# Patient Record
Sex: FEMALE | Race: WHITE | NOT HISPANIC OR LATINO | Employment: OTHER | URBAN - METROPOLITAN AREA
[De-identification: names, ages, dates, MRNs, and addresses within clinical notes are randomized per-mention and may not be internally consistent; named-entity substitution may affect disease eponyms.]

---

## 2023-08-30 ENCOUNTER — PATIENT OUTREACH (OUTPATIENT)
Dept: CARE COORDINATION | Facility: CLINIC | Age: 83
End: 2023-08-30
Payer: MEDICARE

## 2023-08-30 ENCOUNTER — DOCUMENTATION (OUTPATIENT)
Dept: CARE COORDINATION | Facility: CLINIC | Age: 83
End: 2023-08-30
Payer: MEDICARE

## 2023-09-18 ENCOUNTER — PATIENT OUTREACH (OUTPATIENT)
Dept: CARE COORDINATION | Facility: CLINIC | Age: 83
End: 2023-09-18
Payer: MEDICARE

## 2023-09-18 SDOH — ECONOMIC STABILITY: GENERAL: WOULD YOU LIKE HELP WITH ANY OF THE FOLLOWING NEEDS?: I DONT NEED HELP WITH ANY OF THESE

## 2023-09-18 SDOH — ECONOMIC STABILITY: TRANSPORTATION INSECURITY
IN THE PAST 12 MONTHS, HAS THE LACK OF TRANSPORTATION KEPT YOU FROM MEDICAL APPOINTMENTS OR FROM GETTING MEDICATIONS?: NO

## 2023-09-18 SDOH — SOCIAL STABILITY: SOCIAL NETWORK: ARE YOU MARRIED, WIDOWED, DIVORCED, SEPARATED, NEVER MARRIED, OR LIVING WITH A PARTNER?: WIDOWED

## 2023-09-18 NOTE — PROGRESS NOTES
Admitted to [facility] 9/8/23 to 9/17/23 with UTI, urinary retention.  Completed antibiotic course.  Discharged with [facility] and home care with de la garza.  PMH: HTN, HLD, TIA, urinary retention, constipation, LISA, GERD, hypothyroidism, anxiety/depression.   Has appt with psychiatry next week to evaluate discontinuation of Sertraline.   Appt with provider 9/19 and urology in  October Engagement  Call Start Time: 1027 (9/18/2023 10:27 AM)    Medications  Medications reviewed with patient/caregiver?: Yes (9/18/2023 10:27 AM)  Is the patient having any side effects they believe may be caused by any medication additions or changes?: No (9/18/2023 10:27 AM)  Does the patient have all medications ordered at discharge?: Yes (9/18/2023 10:27 AM)  Care Management Interventions: Provided patient education (Patient voices resistance to twice daily Metamucil as does not work immediately.   This writer educated patient that Metamucil is not a stimulant, but is to add fiber and bulk to stool to make it easier to pass and that Laculose is only as needed.) (9/18/2023 10:27 AM)  Is the patient taking all medications as directed (includes completed medication regime)?: Yes (9/18/2023 10:27 AM)    Appointments  Does the patient have a primary care provider?: Yes (9/18/2023 10:27 AM)    Self Management  What is the home health agency?: [facility] (9/18/2023 10:27 AM)    Patient Teaching  Does the patient have access to their discharge instructions?: Yes (9/18/2023 10:27 AM)  Care Management Interventions: Reviewed instructions with patient (9/18/2023 10:27 AM)  What is the patient's perception of their health status since discharge?: Same (9/18/2023 10:27 AM)  Is the patient/caregiver able to teach back the hierarchy of who to call/visit for symptoms/problems? PCP, Specialist, Home Health nurse, Urgent Care, ED, 911: Yes (9/18/2023 10:27 AM)    Wrap Up  Call End Time: 1111 (9/18/2023 10:27 AM)      Successful outreach to  patient for transition of care completion.   Patient states unable to get refull on Dexilant due to what sounds like a piror auth issue.  This writer suggested patient comntact pharmacy to verify and discuss at PCP appt. Will monitor for 30 day transition period and outreach if issues arise.    Dot FORD RN CCM  RN Care Coordinator  [facility]  Phone [phone]

## 2023-09-22 ENCOUNTER — PATIENT OUTREACH (OUTPATIENT)
Dept: CARE COORDINATION | Facility: CLINIC | Age: 83
End: 2023-09-22
Payer: MEDICARE

## 2023-09-22 NOTE — PROGRESS NOTES
Hospital provider appointment follow up.      Per chart review [facility] outreached patient on 9/18 with reported issues by patients able to manage de la garza catheter, taking Lactulose as needed.     Has appts with PCP 9/27 and with urology 10/19.  Patient  has had home visit by RN and PT, but needs HHA to help with shower.    Patient  does not have resources to private pay or does not qualify  for Medicaid to refer to [facility].  Patient  has contacted her insurer for assistance with such.    Patient  needs help with transportation as will require a wheelchair and has contacted insurer for such as well as [facility].  This writer provided [employer] resource for such.     Dot FORD RN CCM  RN Care Coordinator  [facility]  Phone [phone]

## 2023-10-02 ENCOUNTER — APPOINTMENT (OUTPATIENT)
Dept: RADIOLOGY | Facility: HOSPITAL | Age: 83
End: 2023-10-02
Payer: MEDICARE

## 2023-10-02 ENCOUNTER — HOSPITAL ENCOUNTER (INPATIENT)
Facility: HOSPITAL | Age: 83
LOS: 15 days | Discharge: HOME | DRG: 177 | End: 2023-10-17
Attending: GENERAL PRACTICE | Admitting: FAMILY MEDICINE
Payer: MEDICARE

## 2023-10-02 DIAGNOSIS — R09.02 HYPOXIA: ICD-10-CM

## 2023-10-02 DIAGNOSIS — F41.9 ANXIETY: ICD-10-CM

## 2023-10-02 DIAGNOSIS — R54 AGE-RELATED PHYSICAL DEBILITY: Chronic | ICD-10-CM

## 2023-10-02 DIAGNOSIS — K59.04 CHRONIC IDIOPATHIC CONSTIPATION: ICD-10-CM

## 2023-10-02 DIAGNOSIS — R33.9 URINARY RETENTION: Chronic | ICD-10-CM

## 2023-10-02 DIAGNOSIS — U07.1 COVID-19: Primary | ICD-10-CM

## 2023-10-02 DIAGNOSIS — E87.1 HYPONATREMIA: ICD-10-CM

## 2023-10-02 LAB
ALBUMIN SERPL BCP-MCNC: 3.5 G/DL (ref 3.4–5)
ALP SERPL-CCNC: 51 U/L (ref 33–136)
ALT SERPL W P-5'-P-CCNC: 17 U/L (ref 7–45)
ANION GAP SERPL CALC-SCNC: 10 MMOL/L (ref 10–20)
APAP SERPL-MCNC: <10 UG/ML
AST SERPL W P-5'-P-CCNC: 20 U/L (ref 9–39)
BASOPHILS # BLD AUTO: 0.05 X10*3/UL (ref 0–0.1)
BASOPHILS NFR BLD AUTO: 0.8 %
BILIRUB SERPL-MCNC: 0.4 MG/DL (ref 0–1.2)
BUN SERPL-MCNC: 5 MG/DL (ref 6–23)
CALCIUM SERPL-MCNC: 8.4 MG/DL (ref 8.6–10.3)
CHLORIDE SERPL-SCNC: 94 MMOL/L (ref 98–107)
CO2 SERPL-SCNC: 24 MMOL/L (ref 21–32)
CREAT SERPL-MCNC: 0.48 MG/DL (ref 0.5–1.05)
EOSINOPHIL # BLD AUTO: 0.07 X10*3/UL (ref 0–0.4)
EOSINOPHIL NFR BLD AUTO: 1.1 %
ERYTHROCYTE [DISTWIDTH] IN BLOOD BY AUTOMATED COUNT: 15.2 % (ref 11.5–14.5)
ETHANOL SERPL-MCNC: <10 MG/DL
GFR SERPL CREATININE-BSD FRML MDRD: >90 ML/MIN/1.73M*2
GLUCOSE SERPL-MCNC: 98 MG/DL (ref 74–99)
HCT VFR BLD AUTO: 31.9 % (ref 36–46)
HGB BLD-MCNC: 10.8 G/DL (ref 12–16)
IMM GRANULOCYTES # BLD AUTO: 0.03 X10*3/UL (ref 0–0.5)
IMM GRANULOCYTES NFR BLD AUTO: 0.5 % (ref 0–0.9)
LYMPHOCYTES # BLD AUTO: 1.25 X10*3/UL (ref 0.8–3)
LYMPHOCYTES NFR BLD AUTO: 19.6 %
MCH RBC QN AUTO: 32.1 PG (ref 26–34)
MCHC RBC AUTO-ENTMCNC: 33.9 G/DL (ref 32–36)
MCV RBC AUTO: 95 FL (ref 80–100)
MONOCYTES # BLD AUTO: 1.27 X10*3/UL (ref 0.05–0.8)
MONOCYTES NFR BLD AUTO: 19.9 %
NEUTROPHILS # BLD AUTO: 3.7 X10*3/UL (ref 1.6–5.5)
NEUTROPHILS NFR BLD AUTO: 58.1 %
NRBC BLD-RTO: 0 /100 WBCS (ref 0–0)
PLATELET # BLD AUTO: 221 X10*3/UL (ref 150–450)
PMV BLD AUTO: 11.8 FL (ref 7.5–11.5)
POTASSIUM SERPL-SCNC: 3.7 MMOL/L (ref 3.5–5.3)
PROT SERPL-MCNC: 8.5 G/DL (ref 6.4–8.2)
RBC # BLD AUTO: 3.36 X10*6/UL (ref 4–5.2)
SALICYLATES SERPL-MCNC: <3 MG/DL
SARS-COV-2 RNA RESP QL NAA+PROBE: DETECTED
SODIUM SERPL-SCNC: 124 MMOL/L (ref 136–145)
WBC # BLD AUTO: 6.4 X10*3/UL (ref 4.4–11.3)

## 2023-10-02 PROCEDURE — 71046 X-RAY EXAM CHEST 2 VIEWS: CPT | Mod: FY

## 2023-10-02 PROCEDURE — 1210000001 HC SEMI-PRIVATE ROOM DAILY

## 2023-10-02 PROCEDURE — 3E0D73Z INTRODUCTION OF ANTI-INFLAMMATORY INTO MOUTH AND PHARYNX, VIA NATURAL OR ARTIFICIAL OPENING: ICD-10-PCS | Performed by: GENERAL PRACTICE

## 2023-10-02 PROCEDURE — 99285 EMERGENCY DEPT VISIT HI MDM: CPT | Performed by: GENERAL PRACTICE

## 2023-10-02 PROCEDURE — 71046 X-RAY EXAM CHEST 2 VIEWS: CPT | Performed by: RADIOLOGY

## 2023-10-02 PROCEDURE — 2500000001 HC RX 250 WO HCPCS SELF ADMINISTERED DRUGS (ALT 637 FOR MEDICARE OP): Performed by: GENERAL PRACTICE

## 2023-10-02 PROCEDURE — 96376 TX/PRO/DX INJ SAME DRUG ADON: CPT

## 2023-10-02 PROCEDURE — 96366 THER/PROPH/DIAG IV INF ADDON: CPT

## 2023-10-02 PROCEDURE — 96361 HYDRATE IV INFUSION ADD-ON: CPT

## 2023-10-02 PROCEDURE — 80329 ANALGESICS NON-OPIOID 1 OR 2: CPT | Performed by: GENERAL PRACTICE

## 2023-10-02 PROCEDURE — 84075 ASSAY ALKALINE PHOSPHATASE: CPT | Performed by: GENERAL PRACTICE

## 2023-10-02 PROCEDURE — 36415 COLL VENOUS BLD VENIPUNCTURE: CPT | Performed by: GENERAL PRACTICE

## 2023-10-02 PROCEDURE — 5A09357 ASSISTANCE WITH RESPIRATORY VENTILATION, LESS THAN 24 CONSECUTIVE HOURS, CONTINUOUS POSITIVE AIRWAY PRESSURE: ICD-10-PCS | Performed by: FAMILY MEDICINE

## 2023-10-02 PROCEDURE — 96365 THER/PROPH/DIAG IV INF INIT: CPT

## 2023-10-02 PROCEDURE — 96375 TX/PRO/DX INJ NEW DRUG ADDON: CPT

## 2023-10-02 PROCEDURE — 2500000005 HC RX 250 GENERAL PHARMACY W/O HCPCS: Performed by: GENERAL PRACTICE

## 2023-10-02 PROCEDURE — XW043E5 INTRODUCTION OF REMDESIVIR ANTI-INFECTIVE INTO CENTRAL VEIN, PERCUTANEOUS APPROACH, NEW TECHNOLOGY GROUP 5: ICD-10-PCS | Performed by: GENERAL PRACTICE

## 2023-10-02 PROCEDURE — 2500000004 HC RX 250 GENERAL PHARMACY W/ HCPCS (ALT 636 FOR OP/ED): Performed by: GENERAL PRACTICE

## 2023-10-02 PROCEDURE — 85025 COMPLETE CBC W/AUTO DIFF WBC: CPT | Performed by: GENERAL PRACTICE

## 2023-10-02 PROCEDURE — 87635 SARS-COV-2 COVID-19 AMP PRB: CPT | Performed by: GENERAL PRACTICE

## 2023-10-02 RX ORDER — DEXAMETHASONE 6 MG/1
6 TABLET ORAL DAILY
Status: DISCONTINUED | OUTPATIENT
Start: 2023-10-03 | End: 2023-10-03

## 2023-10-02 RX ORDER — DEXAMETHASONE SODIUM PHOSPHATE 4 MG/ML
6 INJECTION, SOLUTION INTRA-ARTICULAR; INTRALESIONAL; INTRAMUSCULAR; INTRAVENOUS; SOFT TISSUE ONCE
Status: COMPLETED | OUTPATIENT
Start: 2023-10-02 | End: 2023-10-02

## 2023-10-02 RX ORDER — ACETAMINOPHEN 325 MG/1
975 TABLET ORAL ONCE
Status: COMPLETED | OUTPATIENT
Start: 2023-10-02 | End: 2023-10-02

## 2023-10-02 RX ORDER — DEXAMETHASONE SODIUM PHOSPHATE 100 MG/10ML
6 INJECTION INTRAMUSCULAR; INTRAVENOUS ONCE
Status: DISCONTINUED | OUTPATIENT
Start: 2023-10-02 | End: 2023-10-02

## 2023-10-02 RX ADMIN — ACETAMINOPHEN 975 MG: 325 TABLET ORAL at 14:58

## 2023-10-02 RX ADMIN — DEXAMETHASONE SODIUM PHOSPHATE 6 MG: 10 INJECTION INTRAMUSCULAR; INTRAVENOUS at 16:40

## 2023-10-02 RX ADMIN — SODIUM CHLORIDE 1000 ML: 9 INJECTION, SOLUTION INTRAVENOUS at 17:55

## 2023-10-02 RX ADMIN — DEXAMETHASONE SODIUM PHOSPHATE 6 MG: 4 INJECTION, SOLUTION INTRAMUSCULAR; INTRAVENOUS at 18:41

## 2023-10-02 RX ADMIN — REMDESIVIR 200 MG: 100 INJECTION, POWDER, LYOPHILIZED, FOR SOLUTION INTRAVENOUS at 18:44

## 2023-10-02 SDOH — HEALTH STABILITY: MENTAL HEALTH: ARE YOU HAVING THOUGHTS OF KILLING YOURSELF RIGHT NOW?: YES

## 2023-10-02 SDOH — HEALTH STABILITY: MENTAL HEALTH: SUICIDAL BEHAVIOR (3 MONTHS): YES

## 2023-10-02 SDOH — HEALTH STABILITY: MENTAL HEALTH: HOW DID YOU TRY TO KILL YOURSELF?: PATIENT DID NOT DESCRIBE

## 2023-10-02 SDOH — HEALTH STABILITY: MENTAL HEALTH: IN THE PAST WEEK, HAVE YOU BEEN HAVING THOUGHTS ABOUT KILLING YOURSELF?: YES

## 2023-10-02 SDOH — HEALTH STABILITY: MENTAL HEALTH: IN THE PAST FEW WEEKS, HAVE YOU FELT THAT YOU OR YOUR FAMILY WOULD BE BETTER OFF IF YOU WERE DEAD?: YES

## 2023-10-02 SDOH — HEALTH STABILITY: MENTAL HEALTH
DEPRESSION SYMPTOMS: APPETITE CHANGE;CRYING;FEELINGS OF HELPLESSNESS;FEELINGS OF HOPELESSESS;FEELINGS OF WORTHLESSNESS;ISOLATIVE;LOSS OF INTEREST;SLEEP DISTURBANCE

## 2023-10-02 SDOH — HEALTH STABILITY: MENTAL HEALTH: NON-SPECIFIC ACTIVE SUICIDAL THOUGHTS (PAST 1 MONTH): YES

## 2023-10-02 SDOH — HEALTH STABILITY: MENTAL HEALTH

## 2023-10-02 SDOH — HEALTH STABILITY: MENTAL HEALTH: IN THE PAST FEW WEEKS, HAVE YOU WISHED YOU WERE DEAD?: YES

## 2023-10-02 SDOH — HEALTH STABILITY: MENTAL HEALTH: DESCRIBE YOUR THOUGHTS OF KILLING YOURSELF RIGHT NOW:: PATIENT DID NOT DESCRIBE

## 2023-10-02 SDOH — HEALTH STABILITY: MENTAL HEALTH: SUICIDAL BEHAVIOR (LIFETIME): YES

## 2023-10-02 SDOH — HEALTH STABILITY: MENTAL HEALTH: HAVE YOU EVER TRIED TO KILL YOURSELF?: YES

## 2023-10-02 SDOH — HEALTH STABILITY: MENTAL HEALTH: ACTIVE SUICIDAL IDEATION WITH SOME INTENT TO ACT, WITHOUT SPECIFIC PLAN (PAST 1 MONTH): NO

## 2023-10-02 SDOH — HEALTH STABILITY: MENTAL HEALTH: WISH TO BE DEAD (PAST 1 MONTH): YES

## 2023-10-02 SDOH — HEALTH STABILITY: MENTAL HEALTH: ANXIETY SYMPTOMS: GENERALIZED;FEELINGS OF DOOM

## 2023-10-02 SDOH — ECONOMIC STABILITY: HOUSING INSECURITY: FEELS SAFE LIVING IN HOME: YES

## 2023-10-02 SDOH — ECONOMIC STABILITY: GENERAL: FINANCIAL CONCERNS: INSURANCE COVERAGE IS INADEQUATE

## 2023-10-02 SDOH — HEALTH STABILITY: MENTAL HEALTH: ACTIVE SUICIDAL IDEATION WITH SPECIFIC PLAN AND INTENT (PAST 1 MONTH): NO

## 2023-10-02 SDOH — HEALTH STABILITY: MENTAL HEALTH: SUICIDAL BEHAVIOR (DESCRIPTION): SHE DID NOT SHARE WITH EPAT

## 2023-10-02 ASSESSMENT — LIFESTYLE VARIABLES
PRESCIPTION_ABUSE_PAST_12_MONTHS: NO
SUBSTANCE_ABUSE_PAST_12_MONTHS: NO

## 2023-10-02 ASSESSMENT — COLUMBIA-SUICIDE SEVERITY RATING SCALE - C-SSRS
5. HAVE YOU STARTED TO WORK OUT OR WORKED OUT THE DETAILS OF HOW TO KILL YOURSELF? DO YOU INTEND TO CARRY OUT THIS PLAN?: NO
6. HAVE YOU EVER DONE ANYTHING, STARTED TO DO ANYTHING, OR PREPARED TO DO ANYTHING TO END YOUR LIFE?: YES
4. HAVE YOU HAD THESE THOUGHTS AND HAD SOME INTENTION OF ACTING ON THEM?: NO
6. HAVE YOU EVER DONE ANYTHING, STARTED TO DO ANYTHING, OR PREPARED TO DO ANYTHING TO END YOUR LIFE?: NO
1. IN THE PAST MONTH, HAVE YOU WISHED YOU WERE DEAD OR WISHED YOU COULD GO TO SLEEP AND NOT WAKE UP?: YES
6. HAVE YOU EVER DONE ANYTHING, STARTED TO DO ANYTHING, OR PREPARED TO DO ANYTHING TO END YOUR LIFE?: NO
5. HAVE YOU STARTED TO WORK OUT OR WORKED OUT THE DETAILS OF HOW TO KILL YOURSELF? DO YOU INTEND TO CARRY OUT THIS PLAN?: NO
2. HAVE YOU ACTUALLY HAD ANY THOUGHTS OF KILLING YOURSELF?: YES
6. HAVE YOU EVER DONE ANYTHING, STARTED TO DO ANYTHING, OR PREPARED TO DO ANYTHING TO END YOUR LIFE?: YES
2. HAVE YOU ACTUALLY HAD ANY THOUGHTS OF KILLING YOURSELF?: YES
1. IN THE PAST MONTH, HAVE YOU WISHED YOU WERE DEAD OR WISHED YOU COULD GO TO SLEEP AND NOT WAKE UP?: YES
4. HAVE YOU HAD THESE THOUGHTS AND HAD SOME INTENTION OF ACTING ON THEM?: NO

## 2023-10-02 ASSESSMENT — PAIN - FUNCTIONAL ASSESSMENT: PAIN_FUNCTIONAL_ASSESSMENT: 0-10

## 2023-10-02 ASSESSMENT — PAIN SCALES - GENERAL
PAINLEVEL_OUTOF10: 5 - MODERATE PAIN
PAINLEVEL_OUTOF10: 0 - NO PAIN
PAINLEVEL_OUTOF10: 8

## 2023-10-02 ASSESSMENT — PAIN DESCRIPTION - LOCATION: LOCATION: ABDOMEN

## 2023-10-02 NOTE — ED PROVIDER NOTES
HPI   Chief Complaint   Patient presents with    Abdominal Pain    Headache     Pt sts that she has been experiencing a HA x all day       HPIHPI: This is an 82-year-old female with a history of HTN, HLD, urinary retention, anxiety/depression presenting for suicidal ideation.  She reports that she has been having thoughts of self-harm without a definitive plan recently.  She denies any attempt to harm herself recently.  She is also endorsing a productive cough and headache over the past several days.      Limitations to history: None  Independent Historians: Patient  External Records Reviewed: HIE, outpatient notes, inpatient notes  ------------------------------------------------------------------------------------------------------------------------------------------  ROS: a ten point review of systems was performed and was negative except as per HPI.  ------------------------------------------------------------------------------------------------------------------------------------------  PMH / PSH: as per HPI, otherwise reviewed in EMR  MEDS: as per HPI, otherwise reviewed in EMR  ALLERGIES: as per HPI, otherwise reviewed in EMR  SocH:  as per HPI, otherwise reviewed in EMR  FH:  as per HPI, otherwise reviewed in EMR  ------------------------------------------------------------------------------------------------------------------------------------------  Physical Exam:  VS: As documented in the triage note and EMR flowsheet from this visit was reviewed  General: Well appearing. No acute distress.   Eyes:  Extraocular movements grossly intact. No scleral icterus. No discharge  HEENT:  Normocephalic.  Atraumatic  Neck: Moves neck freely. No gross masses  CV: Regular rhythm. No murmurs, rubs or gallops   Resp: Clear to auscultation bilaterally. No respiratory distress.    GI: Soft, no masses, nontender. No rebound tenderness or guarding. Samson catheter in place  MSK: Symmetric muscle bulk. No deformities. No lower  extremity edema.  Diffuse tenderness of the thoracolumbar region bilaterally  Skin: Warm, dry, intact.   Neuro: No focal deficits.  A&O x3.   Psych: Appropriate for situation  ------------------------------------------------------------------------------------------------------------------------------------------  Hospital Course / Medical Decision Making:  Independent Interpretations: Chest xray  EKG as interpreted by me: Normal sinus rhythm 81 bpm with normal axis, no bundle branch block and no signs of acute ischemia    MDM: This is an 82-year-old female with a history of HTN, HLD, urinary retention presenting for suicidal ideation.  She denies having definitive plan.  She is noted to be mildly hypoxic in the ED and was placed on nasal cannula.  She is positive for COVID-19.  X-ray shows cardiomegaly with pulmonary opacities suggestive of pulmonary edema.  She was given Decadron and remdesivir in the ED.  The patient was evaluated by EPAT and determined to require hospitalization, however, the fact that she has COVID-19 needs that she does need to be medically admitted.  The patient was admitted to the medicine service and during her admission she will be further evaluated by psychiatry.    Discussion of Management with Other Providers:   I discussed the patient/results with: Emergency medicine team    Final diagnosis and disposition as below.    Labs Reviewed   CBC WITH AUTO DIFFERENTIAL - Abnormal       Result Value    WBC 6.4      nRBC 0.0      RBC 3.36 (*)     Hemoglobin 10.8 (*)     Hematocrit 31.9 (*)     MCV 95      MCH 32.1      MCHC 33.9      RDW 15.2 (*)     Platelets 221      MPV 11.8 (*)     Neutrophils % 58.1      Immature Granulocytes %, Automated 0.5      Lymphocytes % 19.6      Monocytes % 19.9      Eosinophils % 1.1      Basophils % 0.8      Neutrophils Absolute 3.70      Immature Granulocytes Absolute, Automated 0.03      Lymphocytes Absolute 1.25      Monocytes Absolute 1.27 (*)     Eosinophils  Absolute 0.07      Basophils Absolute 0.05     COMPREHENSIVE METABOLIC PANEL - Abnormal    Glucose 98      Sodium 124 (*)     Potassium 3.7      Chloride 94 (*)     Bicarbonate 24      Anion Gap 10      Urea Nitrogen 5 (*)     Creatinine 0.48 (*)     eGFR >90      Calcium 8.4 (*)     Albumin 3.5      Alkaline Phosphatase 51      Total Protein 8.5 (*)     AST 20      Bilirubin, Total 0.4      ALT 17     SARS-COV-2 PCR, SYMPTOMATIC - Abnormal    Coronavirus 2019, PCR Detected (*)     Narrative:     This assay has received FDA Emergency Use Authorization (EUA) and is only authorized for the duration of time that circumstances exist to justify the authorization of the emergency use of in vitro diagnostic tests for the detection of SARS-CoV-2 virus and/or diagnosis of COVID-19 infection under section 564(b)(1) of the Act, 21 U.S.C. 360bbb-3(b)(1). This assay is an in vitro diagnostic nucleic acid amplification test for the qualitative detection of SARS-CoV-2 from nasopharyngeal specimens and has been validated for use at [facility]. Negative results do not preclude COVID-19 infections and should not be used as the sole basis for diagnosis, treatment, or other management decisions.     CBC - Abnormal    WBC 8.1      nRBC 0.0      RBC 4.03      Hemoglobin 12.9      Hematocrit 39.0      MCV 97      MCH 32.0      MCHC 33.1      RDW 14.8 (*)     Platelets 212      MPV 10.7     BASIC METABOLIC PANEL - Abnormal    Glucose 103 (*)     Sodium 123 (*)     Potassium 3.6      Chloride 89 (*)     Bicarbonate 26      Anion Gap 12      Urea Nitrogen 13      Creatinine 0.48 (*)     eGFR >90      Calcium 7.9 (*)    CBC - Abnormal    WBC 7.1      nRBC 0.0      RBC 3.77 (*)     Hemoglobin 12.1      Hematocrit 34.2 (*)     MCV 91      MCH 32.1      MCHC 35.4      RDW 14.1      Platelets 229      MPV 10.6     BASIC METABOLIC PANEL - Abnormal    Glucose 97      Sodium 122 (*)     Potassium 3.1 (*)     Chloride 91 (*)      Bicarbonate 25      Anion Gap 9 (*)     Urea Nitrogen 13      Creatinine 0.41 (*)     eGFR >90      Calcium 7.5 (*)    URIC ACID - Abnormal    Uric Acid 2.2 (*)    OSMOLALITY - Abnormal    Osmolality, Serum 259 (*)    URINALYSIS WITH REFLEX MICROSCOPIC - Abnormal    Color, Urine Yellow      Appearance, Urine Cloudy (*)     Specific Gravity, Urine 1.015      pH, Urine 7.0      Protein, Urine 100 (2+) (*)     Glucose, Urine NEGATIVE      Blood, Urine MODERATE (2+) (*)     Ketones, Urine NEGATIVE      Bilirubin, Urine NEGATIVE      Urobilinogen, Urine 4.0 (*)     Nitrite, Urine NEGATIVE      Leukocyte Esterase, Urine LARGE (3+) (*)    BASIC METABOLIC PANEL - Abnormal    Glucose 117 (*)     Sodium 121 (*)     Potassium 3.3 (*)     Chloride 90 (*)     Bicarbonate 26      Anion Gap 8 (*)     Urea Nitrogen 13      Creatinine 0.41 (*)     eGFR >90      Calcium 7.3 (*)    URINALYSIS MICROSCOPIC ONLY - Abnormal    WBC, Urine >50 (*)     RBC, Urine 11-20 (*)     Bacteria, Urine 3+ (*)     Mucus, Urine 1+      Triple Phosphate Crystals, Urine 1+      Amorphous Crystals, Urine 1+     CBC - Abnormal    WBC 7.0      nRBC 0.0      RBC 3.87 (*)     Hemoglobin 12.5      Hematocrit 34.9 (*)     MCV 90      MCH 32.3      MCHC 35.8      RDW 13.9      Platelets 238      MPV 10.8     POCT GLUCOSE - Abnormal    POCT Glucose 102 (*)    DRUG SCREEN, URINE WITH REFLEX TO CONFIRMATION - Normal    Amphetamine Screen, Urine Presumptive Negative      Barbiturate Screen, Urine Presumptive Negative      Benzodiazepines Screen, Urine Presumptive Negative      Cannabinoid Screen, Urine Presumptive Negative      Cocaine Metabolite Screen, Urine Presumptive Negative      Fentanyl Screen, Urine Presumptive Negative      Opiate Screen, Urine Presumptive Negative      Oxycodone Screen, Urine Presumptive Negative      PCP Screen, Urine Presumptive Negative      Narrative:     Drug screen results are presumptive and should not be used to assess    compliance with prescribed medication. Definitive confirmatory drug testing   has been added to this sample for any positive screen result and will be  reported separately.     Toxicology screening results are reported qualitatively. The concentration must  be greater than or equal to the cutoff to be reported as positive. The concentration   at which the screening test can detect an individual drug or metabolite varies.   The absence of expected drug(s) and/or drug metabolite(s) may indicate non-compliance,   inappropriate timing of specimen collection relative to drug administration, poor drug   absorption, diluted/adulterated urine, or limitations of testing. For medical purposes   only; not valid for forensic use.    Interpretive questions should be directed to the laboratory medical directors.   ACUTE TOXICOLOGY PANEL, BLOOD - Normal    Acetaminophen <10.0      Salicylate  <3      Alcohol <10     OSMOLALITY, URINE - Normal    Osmolality, Urine Random 450     TSH WITH REFLEX TO FREE T4 IF ABNORMAL - Normal    Thyroid Stimulating Hormone 1.74      Narrative:     TSH testing is performed using different testing methodology at [facility] than at other [facility]. Direct result comparisons should only be made within the same method.     POCT GLUCOSE - Normal    POCT Glucose 98     SODIUM, URINE RANDOM    Sodium, Urine Random 18      Creatinine, Urine Random 67.9      Sodium/Creatinine Ratio 27     GRAY TOP   GREEN TOP                         Leticia Coma Scale Score: 15                  Patient History   Past Medical History:   Diagnosis Date    Personal history of other mental and behavioral disorders     History of psychiatric hospitalization    Personal history of other specified conditions 01/10/2022    History of insomnia     Past Surgical History:   Procedure Laterality Date    MR HEAD ANGIO WO IV CONTRAST  1/5/2012    MR HEAD ANGIO WO IV CONTRAST 1/5/2012 [facility]ICAL [facility]GAC[facility]     No  family history on file.  Social History     Tobacco Use    Smoking status: Not on file    Smokeless tobacco: Not on file   Substance Use Topics    Alcohol use: Not on file    Drug use: Not on file       Physical Exam   ED Triage Vitals [10/02/23 1102]   Temp Heart Rate Resp BP   37.9 °C (100.3 °F) 75 20 (!) 169/122      SpO2 Temp src Heart Rate Source Patient Position   100 % -- Other (Comment) Sitting      BP Location FiO2 (%)     Left arm --       Physical Exam    ED Course & MDM   Diagnoses as of 10/08/23 1119   COVID-19       Medical Decision Making      Procedure  Procedures     Dago Liu,   10/08/23 1123

## 2023-10-02 NOTE — PROGRESS NOTES
EPAT - Social Work Psychiatric Assessment    Arrival Details  Mode of Arrival: Ambulance  Admission Source: Home  Admission Type: Voluntary  EPAT Assessment Start Date: 10/02/23  EPAT Assessment Start Time: 1424  Name of : Ange Hayward M.Ed., DAT    History of Present Illness  Admission Reason: Headache, depression and suicidal ideation  HPI: -         Additional Symptoms - Adult  Generalized Anxiety Disorder: Difficult to control worry, Difficulity concentrating, Excessive anxiety/worry, Sleep disturbance  Obsessive Compulsive Disorder: No problems reported or observed.  Panic Attack: No problems reported or observed.  Post Traumatic Stress Disorder: No problems reported or observed.  Delirium: No problems reported or observed.  Review of Symptoms Comments: Patient has a history of severe major depressive disorder    Past Psychiatric History/Meds/Treatments  Past Psychiatric History: Anxiety and major depressive disorder  Past Psychiatric Meds/Treatments: Received ECT about 12 years ago for the depression. Is currently is prescribed psychiatric medications for depression/anxiety (certamine and quetipine)  Past Violence/Victimization History: None reported    Current Mental Health Contacts   Name/Phone Number: None   Last Appointment Date: -  Provider Name/Phone Number: -  Provider Last Appointment Date: -    Support System: Friends    Living Arrangement: House    Home Safety  Feels Safe Living in Home: Yes  Home Safety : Feels safe at home.    Income Information  Employment Status for: Patient (Retired)  Employment Status: Retired  Income Source: Social Security  Current/Previous Occupation: Unable to Assess  Income/Expense Information: Income meets expenses  Financial Concerns: Insurance Coverage is Inadequate  Who Manages Finances if Patient Unable: No one    [place] Service/Education History  Current or Previous  Service: None  Education Level:  (Not assessed)  History  of Learning Problems:  (Not assessed)  History of School Behavior Problems:  (Not assessed)  School History: Not assessed    Social/Cultural History  Social History: [place] citizen  Cultural Requests During Hospitalization: None  Spiritual Requests During Hospitalization: None  Important Activities: Social (Patient likes to be around friends)    Legal  Legal Considerations: Patient/ Family Ability to Make Healthcare Decisions  Assistance with Managing/Advocating Healthcare Needs: Other (Comment)  Criminal Activity/ Legal Involvement Pertinent to Current Situation/ Hospitalization: No legal activity  Legal Concerns: No legal concerns  Legal Comments: None    Drug Screening  Have you used any substances (canabis, cocaine, heroin, hallucinogens, inhalants, etc.) in the past 12 months?: No  Have you used any prescription drugs other than prescribed in the past 12 months?: No  Is a toxicology screen needed?: Yes (Only for admission purposes)    Stage of Change  Stage of Change: Other (Comment) (n/a)  History of Treatment: Other (Comment) (None)  Type of Treatment Offered:  (None)  Treatment Offered:  (None)  Duration of Substance Use: n/a  Frequency of Substance Use: n/a  Age of First Substance Use: n/a    Psychosocial  Psychosocial (WDL): Exceptions to WDL  Behaviors/Mood: Anxious, Appropriate for situation, Calm, Congruent, Cooperative, Sad, Tearful  Affect: Appropriate to circumstances  Parent/Guardian/Significant Other Involvement: No involvement  Family Behaviors: Other (Comment) (Patient is 82 yrs old and lives alone with  no one to help her)  Visitor Behaviors: Other (Comment) (None)  Needs Expressed: Other (Comment) (Patient was able to explain how she was feeling and that she needs/wants help)  Emotional Support Given: Patient counseling    Orientation  Orientation Level: Oriented X4    General Appearance  Motor Activity: Other (Comment) (Patient has health issues and is 82. She has back pain and may need help with  "daily chores and getting dressed)  Speech Pattern:  (Patient communicates well)  General Attitude: Cooperative, Other (Comment) (Patient was able to communicate frustrations and symptoms)  Appearance/Hygiene: Disheveled, Soiled clothes, Other (Comment) (Patient has been having a hard time with her health and wants to try and \"get dressed and fix herself up\". She said she hasn't had the will to do it lately.)    Thought Process  Coherency: Other (Comment) (Coherent and logical thoughts and communication)  Content: Unremarkable  Delusions:  (None)  Perception: Not altered  Hallucination: None  Judgment/Insight: Other (Comment) (Good judgement and insight into knowing that her mental health has not been good.)  Confusion: None  Cognition: Appropriate judgement, Appropriate attention/concentration, Follows commands, No long term memory loss, No short term memory loss    Sleep Pattern  Sleep Pattern: Difficulty falling asleep, Insomnia, Disturbed/interrupted sleep    Risk Factors  Self Harm/Suicidal Ideation Plan: Thinks about suicide but no plan  Previous Self Harm/Suicidal Plans: None  Risk Factors:  (Patient is 82, all along, and experiencing severe depressive symptoms)  Description of Thoughts/Ideas Leaving Unit Now: unkown    Violence Risk Assessment  Assessment of Violence: None noted  Thoughts of Harm to Others: No    Ability to Assess Risk Screen  Risk Screen - Ability to Assess: Able to be screened  Ask Suicide-Screening Questions  1. In the past few weeks, have you wished you were dead?: Yes  2. In the past few weeks, have you felt that you or your family would be better off if you were dead?: Yes  3. In the past week, have you been having thoughts about killing yourself?: Yes  4. Have you ever tried to kill yourself?: Yes  How did you try to kill yourself?: patient did not describe  When did you try to kill yourself?: About 12 years ago she moved from [place] to [place]. and \"had to leave a dear friend\" and " became extremely depressed living all alone.  5. Are you having thoughts of killing yourself right now?: Yes  Describe your thoughts of killing yourself right now:: Patient did not describe  Calculated Risk Score: Imminent Risk  [place] Suicide Severity Rating Scale (Screener/Recent Self-Report)  1. Wish to be Dead (Past 1 Month): Yes  2. Non-Specific Active Suicidal Thoughts (Past 1 Month): Yes  3. Active Suicidal Ideation with any Methods (Not Plan) Without Intent to Act (Past 1 Month): No  4. Active Suicidal Ideation with Some Intent to Act, Without Specific Plan (Past 1 Month): No  5. Active Suicidal Ideation with Specific Plan and Intent (Past 1 Month): No  6. Suicidal Behavior (Lifetime): Yes  6. Suicidal Behavior (3 Months): Yes  6. Suicidal Behavior (Description): She did not share with epat  Calculated C-SSRS Risk Score (Lifetime/Recent): High Risk  Step 1: Risk Factors  Presenting Symptoms: Anhedonia, Hopelessness or despair, Anxiety and/or panic, Insomia (Depression)  Precipitants/Stressors: Triggering events leading to humiliation, shame, and/or despair (e.g. loss of relationship, financial or health status) (real or anticipated), Chronic physical pain or other acute medical problem (e.g. CNS disorders), Inadequate social supports, Social isolation  Change in Treatment: Hopeless or dissatisfied with provider or treatment  Access to Lethal Methods : No  Step 2: Protective Factors   Protective Factors Internal: Other (Comment) (Patient said she's losing interest to even get out of bed anymore)  Protective Factors External: Other (Comment) (friends)  Step 3: Suicidal Ideation Intensity  Most Severe Suicidal Ideation Identified: Has constant suicidal thoughts  How Many Times Have You Had These Thoughts: Daily or almost daily  When You Have the Thoughts How Long do They Last : More than 8 hours/persistent or continuous  Could/Can You Stop Thinking About Killing Yourself or Wanting to Die if You Want to:  Unable to control thoughts  Are There Things - Anyone or Anything - That Stopped You From Wanting to Die or Acting on: Deterrents most definitely did not stop you  What Sort of Reasons Did You Have For Thinking About Wanting to Die or Killing Yourself: Mostly to end or stop the pain (you couldn't go on living with the pain or how you were feeling)  Total Score: 23  Step 5: Documentation  Risk Level: High suicide risk    Psychiatric Impression and Plan of Care  Specific Resources Provided to Patient: Inpatient psychiatric admission  CM Notified: -  PHP/IOP Recommended: -  Specific Information Provided for PHP/IOP: -  Plan Comments: -    Outcome/Disposition  Patient's Perception of Outcome Achieved: Satisfied  Assessment, Recommendations and Risk Level Reviewed with: The patient is a 82 year old female presenting in the ED with a headache, depressive symptoms, and suicidal ideation. The patient has a history of inpatient hospitalization for depression 12 years ago and has attempted suicide then. The patient said at that time, 12 years ago she got ECT treatment and that helped her depression. She is currently on medication for anxiety and depression, but feels like it is not effective. The patient has had some health issues and has been confined to her house which is making her depression worsen. She lives by herself and said that she misses being able to be out with her friends. She has SI but does not have a plan but has been thinking a lot about ending her life. She scores high risk for suicide according to this writers opinion and according to the [place] suicide assessment. The patient would benefit and meets criteria for inpatient psychiatric hospitalization.

## 2023-10-02 NOTE — ED TRIAGE NOTES
Patient states she has had thoughts  of hurting herself for a few months now. Patient states she doesn't have a plan.

## 2023-10-03 LAB — GLUCOSE BLD MANUAL STRIP-MCNC: 98 MG/DL (ref 74–99)

## 2023-10-03 PROCEDURE — 2500000001 HC RX 250 WO HCPCS SELF ADMINISTERED DRUGS (ALT 637 FOR MEDICARE OP): Performed by: FAMILY MEDICINE

## 2023-10-03 PROCEDURE — 2500000004 HC RX 250 GENERAL PHARMACY W/ HCPCS (ALT 636 FOR OP/ED): Performed by: FAMILY MEDICINE

## 2023-10-03 PROCEDURE — 2500000001 HC RX 250 WO HCPCS SELF ADMINISTERED DRUGS (ALT 637 FOR MEDICARE OP): Performed by: PHYSICIAN ASSISTANT

## 2023-10-03 PROCEDURE — 2500000004 HC RX 250 GENERAL PHARMACY W/ HCPCS (ALT 636 FOR OP/ED): Performed by: PHYSICIAN ASSISTANT

## 2023-10-03 PROCEDURE — 1100000001 HC PRIVATE ROOM DAILY

## 2023-10-03 PROCEDURE — 82947 ASSAY GLUCOSE BLOOD QUANT: CPT

## 2023-10-03 RX ORDER — CLOTRIMAZOLE AND BETAMETHASONE DIPROPIONATE 10; .64 MG/G; MG/G
1 CREAM TOPICAL 2 TIMES DAILY
Status: DISCONTINUED | OUTPATIENT
Start: 2023-10-03 | End: 2023-10-17 | Stop reason: HOSPADM

## 2023-10-03 RX ORDER — CYCLOSPORINE 0.5 MG/ML
1 EMULSION OPHTHALMIC EVERY 12 HOURS
Status: DISCONTINUED | OUTPATIENT
Start: 2023-10-03 | End: 2023-10-17 | Stop reason: HOSPADM

## 2023-10-03 RX ORDER — PROPRANOLOL HYDROCHLORIDE 20 MG/1
1 TABLET ORAL 2 TIMES DAILY
COMMUNITY
Start: 2023-09-17

## 2023-10-03 RX ORDER — LEVOTHYROXINE SODIUM 25 UG/1
25 TABLET ORAL DAILY
Status: DISCONTINUED | OUTPATIENT
Start: 2023-10-03 | End: 2023-10-17 | Stop reason: HOSPADM

## 2023-10-03 RX ORDER — FLUTICASONE PROPIONATE 50 MCG
1 SPRAY, SUSPENSION (ML) NASAL DAILY PRN
Status: DISCONTINUED | OUTPATIENT
Start: 2023-10-03 | End: 2023-10-17 | Stop reason: HOSPADM

## 2023-10-03 RX ORDER — BRIMONIDINE TARTRATE 1 MG/ML
1 SOLUTION/ DROPS OPHTHALMIC EVERY 12 HOURS
COMMUNITY
End: 2024-05-02 | Stop reason: ENTERED-IN-ERROR

## 2023-10-03 RX ORDER — PROCHLORPERAZINE EDISYLATE 5 MG/ML
10 INJECTION INTRAMUSCULAR; INTRAVENOUS EVERY 6 HOURS PRN
Status: DISCONTINUED | OUTPATIENT
Start: 2023-10-03 | End: 2023-10-17 | Stop reason: HOSPADM

## 2023-10-03 RX ORDER — CLOTRIMAZOLE AND BETAMETHASONE DIPROPIONATE 10; .64 MG/G; MG/G
1 CREAM TOPICAL 2 TIMES DAILY
COMMUNITY
Start: 2022-01-26 | End: 2024-05-02 | Stop reason: ENTERED-IN-ERROR

## 2023-10-03 RX ORDER — SERTRALINE HYDROCHLORIDE 50 MG/1
100 TABLET, FILM COATED ORAL DAILY
COMMUNITY
Start: 2022-01-26 | End: 2023-10-18 | Stop reason: SDUPTHER

## 2023-10-03 RX ORDER — CLOBETASOL PROPIONATE 0.46 MG/ML
1 SOLUTION TOPICAL 2 TIMES DAILY
Status: DISCONTINUED | OUTPATIENT
Start: 2023-10-03 | End: 2023-10-03

## 2023-10-03 RX ORDER — LEVOTHYROXINE SODIUM 25 UG/1
1 TABLET ORAL DAILY
COMMUNITY
Start: 2021-08-04

## 2023-10-03 RX ORDER — PANTOPRAZOLE SODIUM 20 MG/1
20 TABLET, DELAYED RELEASE ORAL
Status: DISCONTINUED | OUTPATIENT
Start: 2023-10-03 | End: 2023-10-17 | Stop reason: HOSPADM

## 2023-10-03 RX ORDER — CICLOPIROX 1 G/100ML
1 SHAMPOO TOPICAL NIGHTLY
COMMUNITY
End: 2024-05-02 | Stop reason: ENTERED-IN-ERROR

## 2023-10-03 RX ORDER — LATANOPROST 50 UG/ML
1 SOLUTION/ DROPS OPHTHALMIC NIGHTLY
COMMUNITY
Start: 2023-04-27 | End: 2024-05-02 | Stop reason: ENTERED-IN-ERROR

## 2023-10-03 RX ORDER — DEXLANSOPRAZOLE 30 MG/1
1 CAPSULE, DELAYED RELEASE ORAL DAILY
COMMUNITY
End: 2024-05-02 | Stop reason: ENTERED-IN-ERROR

## 2023-10-03 RX ORDER — CLOBETASOL PROPIONATE 0.46 MG/ML
1 SOLUTION TOPICAL 2 TIMES DAILY
COMMUNITY
Start: 2022-12-01 | End: 2024-05-02 | Stop reason: ENTERED-IN-ERROR

## 2023-10-03 RX ORDER — FAMCICLOVIR 500 MG/1
500 TABLET ORAL ONCE AS NEEDED
COMMUNITY
Start: 2023-08-30 | End: 2024-05-02 | Stop reason: ENTERED-IN-ERROR

## 2023-10-03 RX ORDER — BUSPIRONE HYDROCHLORIDE 5 MG/1
5 TABLET ORAL 2 TIMES DAILY
Status: DISCONTINUED | OUTPATIENT
Start: 2023-10-03 | End: 2023-10-04

## 2023-10-03 RX ORDER — TIMOLOL MALEATE 5 MG/ML
1 SOLUTION/ DROPS OPHTHALMIC DAILY
COMMUNITY

## 2023-10-03 RX ORDER — POLYETHYLENE GLYCOL 3350 17 G/17G
17 POWDER, FOR SOLUTION ORAL DAILY
Status: DISCONTINUED | OUTPATIENT
Start: 2023-10-03 | End: 2023-10-17 | Stop reason: HOSPADM

## 2023-10-03 RX ORDER — CYCLOSPORINE 0.5 MG/ML
1 EMULSION OPHTHALMIC EVERY 12 HOURS
COMMUNITY
Start: 2023-06-11 | End: 2024-05-02 | Stop reason: ENTERED-IN-ERROR

## 2023-10-03 RX ORDER — ENOXAPARIN SODIUM 100 MG/ML
40 INJECTION SUBCUTANEOUS DAILY
Status: DISCONTINUED | OUTPATIENT
Start: 2023-10-03 | End: 2023-10-17 | Stop reason: HOSPADM

## 2023-10-03 RX ORDER — LATANOPROST 50 UG/ML
1 SOLUTION/ DROPS OPHTHALMIC NIGHTLY
Status: DISCONTINUED | OUTPATIENT
Start: 2023-10-03 | End: 2023-10-17 | Stop reason: HOSPADM

## 2023-10-03 RX ORDER — FLUTICASONE PROPIONATE 50 MCG
1 SPRAY, SUSPENSION (ML) NASAL DAILY
COMMUNITY
End: 2024-05-02 | Stop reason: ENTERED-IN-ERROR

## 2023-10-03 RX ORDER — BUSPIRONE HYDROCHLORIDE 5 MG/1
5 TABLET ORAL 2 TIMES DAILY
COMMUNITY
Start: 2023-09-21 | End: 2023-10-17 | Stop reason: HOSPADM

## 2023-10-03 RX ORDER — QUETIAPINE FUMARATE 50 MG/1
50 TABLET, FILM COATED ORAL NIGHTLY
Status: ON HOLD | COMMUNITY
End: 2023-10-12 | Stop reason: SDUPTHER

## 2023-10-03 RX ORDER — QUETIAPINE FUMARATE 50 MG/1
50 TABLET, FILM COATED ORAL NIGHTLY
Status: DISCONTINUED | OUTPATIENT
Start: 2023-10-03 | End: 2023-10-04

## 2023-10-03 RX ORDER — TIMOLOL MALEATE 5 MG/ML
1 SOLUTION/ DROPS OPHTHALMIC DAILY
Status: DISCONTINUED | OUTPATIENT
Start: 2023-10-03 | End: 2023-10-17 | Stop reason: HOSPADM

## 2023-10-03 RX ORDER — SERTRALINE HYDROCHLORIDE 50 MG/1
100 TABLET, FILM COATED ORAL DAILY
Status: DISCONTINUED | OUTPATIENT
Start: 2023-10-03 | End: 2023-10-04

## 2023-10-03 RX ORDER — PROPRANOLOL HYDROCHLORIDE 10 MG/1
20 TABLET ORAL 2 TIMES DAILY
Status: DISCONTINUED | OUTPATIENT
Start: 2023-10-03 | End: 2023-10-17 | Stop reason: HOSPADM

## 2023-10-03 RX ORDER — TALC
3 POWDER (GRAM) TOPICAL NIGHTLY
Status: DISCONTINUED | OUTPATIENT
Start: 2023-10-03 | End: 2023-10-17 | Stop reason: HOSPADM

## 2023-10-03 RX ORDER — BRIMONIDINE TARTRATE 2 MG/ML
1 SOLUTION/ DROPS OPHTHALMIC EVERY 12 HOURS
Status: DISCONTINUED | OUTPATIENT
Start: 2023-10-03 | End: 2023-10-17 | Stop reason: HOSPADM

## 2023-10-03 RX ADMIN — PANTOPRAZOLE SODIUM 20 MG: 20 TABLET, DELAYED RELEASE ORAL at 08:14

## 2023-10-03 RX ADMIN — PROCHLORPERAZINE EDISYLATE 10 MG: 5 INJECTION INTRAMUSCULAR; INTRAVENOUS at 21:58

## 2023-10-03 RX ADMIN — CLOTRIMAZOLE AND BETAMETHASONE DIPROPIONATE 1 APPLICATION: 10; .64 CREAM TOPICAL at 08:14

## 2023-10-03 RX ADMIN — POLYETHYLENE GLYCOL (3350) 17 G: 17 POWDER, FOR SOLUTION ORAL at 13:30

## 2023-10-03 RX ADMIN — QUETIAPINE FUMARATE 50 MG: 50 TABLET ORAL at 21:58

## 2023-10-03 RX ADMIN — PROPRANOLOL HYDROCHLORIDE 20 MG: 10 TABLET ORAL at 08:14

## 2023-10-03 RX ADMIN — Medication 3 MG: at 23:21

## 2023-10-03 RX ADMIN — BUSPIRONE HYDROCHLORIDE 5 MG: 5 TABLET ORAL at 08:14

## 2023-10-03 RX ADMIN — SERTRALINE HYDROCHLORIDE 100 MG: 50 TABLET ORAL at 08:14

## 2023-10-03 RX ADMIN — CYCLOSPORINE 1 DROP: 0.5 EMULSION OPHTHALMIC at 18:11

## 2023-10-03 RX ADMIN — BRIMONIDINE TARTRATE 1 DROP: 2 SOLUTION/ DROPS OPHTHALMIC at 18:11

## 2023-10-03 RX ADMIN — TIMOLOL MALEATE 1 DROP: 5 SOLUTION/ DROPS OPHTHALMIC at 11:57

## 2023-10-03 RX ADMIN — LEVOTHYROXINE SODIUM 25 MCG: 0.03 TABLET ORAL at 08:14

## 2023-10-03 RX ADMIN — BRIMONIDINE TARTRATE 1 DROP: 2 SOLUTION/ DROPS OPHTHALMIC at 08:14

## 2023-10-03 RX ADMIN — PROPRANOLOL HYDROCHLORIDE 20 MG: 10 TABLET ORAL at 21:58

## 2023-10-03 RX ADMIN — BUSPIRONE HYDROCHLORIDE 5 MG: 5 TABLET ORAL at 21:57

## 2023-10-03 RX ADMIN — ENOXAPARIN SODIUM 40 MG: 40 INJECTION SUBCUTANEOUS at 11:56

## 2023-10-03 SDOH — ECONOMIC STABILITY: INCOME INSECURITY: IN THE PAST 12 MONTHS, HAS THE ELECTRIC, GAS, OIL, OR WATER COMPANY THREATENED TO SHUT OFF SERVICE IN YOUR HOME?: NO

## 2023-10-03 SDOH — SOCIAL STABILITY: SOCIAL INSECURITY: ARE YOU OR HAVE YOU BEEN THREATENED OR ABUSED PHYSICALLY, EMOTIONALLY, OR SEXUALLY BY ANYONE?: NO

## 2023-10-03 SDOH — HEALTH STABILITY: MENTAL HEALTH: HOW OFTEN DO YOU HAVE 6 OR MORE DRINKS ON ONE OCCASION?: NEVER

## 2023-10-03 SDOH — SOCIAL STABILITY: SOCIAL INSECURITY: HAS ANYONE EVER THREATENED TO HURT YOUR FAMILY OR YOUR PETS?: NO

## 2023-10-03 SDOH — SOCIAL STABILITY: SOCIAL INSECURITY: HAVE YOU HAD THOUGHTS OF HARMING ANYONE ELSE?: NO

## 2023-10-03 SDOH — ECONOMIC STABILITY: FOOD INSECURITY: WITHIN THE PAST 12 MONTHS, THE FOOD YOU BOUGHT JUST DIDN'T LAST AND YOU DIDN'T HAVE MONEY TO GET MORE.: SOMETIMES TRUE

## 2023-10-03 SDOH — SOCIAL STABILITY: SOCIAL INSECURITY: WITHIN THE LAST YEAR, HAVE YOU BEEN HUMILIATED OR EMOTIONALLY ABUSED IN OTHER WAYS BY YOUR PARTNER OR EX-PARTNER?: NO

## 2023-10-03 SDOH — HEALTH STABILITY: MENTAL HEALTH
STRESS IS WHEN SOMEONE FEELS TENSE, NERVOUS, ANXIOUS, OR CAN'T SLEEP AT NIGHT BECAUSE THEIR MIND IS TROUBLED. HOW STRESSED ARE YOU?: VERY MUCH

## 2023-10-03 SDOH — ECONOMIC STABILITY: INCOME INSECURITY: HOW HARD IS IT FOR YOU TO PAY FOR THE VERY BASICS LIKE FOOD, HOUSING, MEDICAL CARE, AND HEATING?: NOT VERY HARD

## 2023-10-03 SDOH — HEALTH STABILITY: MENTAL HEALTH: HOW OFTEN DO YOU HAVE A DRINK CONTAINING ALCOHOL?: NEVER

## 2023-10-03 SDOH — HEALTH STABILITY: PHYSICAL HEALTH: ON AVERAGE, HOW MANY MINUTES DO YOU ENGAGE IN EXERCISE AT THIS LEVEL?: 60 MIN

## 2023-10-03 SDOH — SOCIAL STABILITY: SOCIAL INSECURITY: DOES ANYONE TRY TO KEEP YOU FROM HAVING/CONTACTING OTHER FRIENDS OR DOING THINGS OUTSIDE YOUR HOME?: NO

## 2023-10-03 SDOH — ECONOMIC STABILITY: FOOD INSECURITY: WITHIN THE PAST 12 MONTHS, YOU WORRIED THAT YOUR FOOD WOULD RUN OUT BEFORE YOU GOT MONEY TO BUY MORE.: SOMETIMES TRUE

## 2023-10-03 SDOH — SOCIAL STABILITY: SOCIAL NETWORK: ARE YOU MARRIED, WIDOWED, DIVORCED, SEPARATED, NEVER MARRIED, OR LIVING WITH A PARTNER?: WIDOWED

## 2023-10-03 SDOH — ECONOMIC STABILITY: INCOME INSECURITY: IN THE LAST 12 MONTHS, WAS THERE A TIME WHEN YOU WERE NOT ABLE TO PAY THE MORTGAGE OR RENT ON TIME?: NO

## 2023-10-03 SDOH — ECONOMIC STABILITY: HOUSING INSECURITY
IN THE LAST 12 MONTHS, WAS THERE A TIME WHEN YOU DID NOT HAVE A STEADY PLACE TO SLEEP OR SLEPT IN A SHELTER (INCLUDING NOW)?: NO

## 2023-10-03 SDOH — SOCIAL STABILITY: SOCIAL INSECURITY: ABUSE: ADULT

## 2023-10-03 SDOH — SOCIAL STABILITY: SOCIAL INSECURITY: ARE THERE ANY APPARENT SIGNS OF INJURIES/BEHAVIORS THAT COULD BE RELATED TO ABUSE/NEGLECT?: NO

## 2023-10-03 SDOH — SOCIAL STABILITY: SOCIAL INSECURITY
WITHIN THE LAST YEAR, HAVE TO BEEN RAPED OR FORCED TO HAVE ANY KIND OF SEXUAL ACTIVITY BY YOUR PARTNER OR EX-PARTNER?: NO

## 2023-10-03 SDOH — HEALTH STABILITY: PHYSICAL HEALTH: ON AVERAGE, HOW MANY DAYS PER WEEK DO YOU ENGAGE IN MODERATE TO STRENUOUS EXERCISE (LIKE A BRISK WALK)?: 1 DAY

## 2023-10-03 SDOH — SOCIAL STABILITY: SOCIAL INSECURITY: DO YOU FEEL UNSAFE GOING BACK TO THE PLACE WHERE YOU ARE LIVING?: NO

## 2023-10-03 SDOH — SOCIAL STABILITY: SOCIAL INSECURITY: WITHIN THE LAST YEAR, HAVE YOU BEEN AFRAID OF YOUR PARTNER OR EX-PARTNER?: NO

## 2023-10-03 SDOH — SOCIAL STABILITY: SOCIAL NETWORK: HOW OFTEN DO YOU ATTENT MEETINGS OF THE CLUB OR ORGANIZATION YOU BELONG TO?: NEVER

## 2023-10-03 SDOH — SOCIAL STABILITY: SOCIAL NETWORK
DO YOU BELONG TO ANY CLUBS OR ORGANIZATIONS SUCH AS CHURCH GROUPS UNIONS, FRATERNAL OR ATHLETIC GROUPS, OR SCHOOL GROUPS?: NO

## 2023-10-03 SDOH — SOCIAL STABILITY: SOCIAL INSECURITY: DO YOU FEEL ANYONE HAS EXPLOITED OR TAKEN ADVANTAGE OF YOU FINANCIALLY OR OF YOUR PERSONAL PROPERTY?: NO

## 2023-10-03 SDOH — HEALTH STABILITY: MENTAL HEALTH: HOW MANY STANDARD DRINKS CONTAINING ALCOHOL DO YOU HAVE ON A TYPICAL DAY?: PATIENT DOES NOT DRINK

## 2023-10-03 SDOH — SOCIAL STABILITY: SOCIAL INSECURITY: WERE YOU ABLE TO COMPLETE ALL THE BEHAVIORAL HEALTH SCREENINGS?: YES

## 2023-10-03 SDOH — SOCIAL STABILITY: SOCIAL INSECURITY
WITHIN THE LAST YEAR, HAVE YOU BEEN KICKED, HIT, SLAPPED, OR OTHERWISE PHYSICALLY HURT BY YOUR PARTNER OR EX-PARTNER?: NO

## 2023-10-03 SDOH — ECONOMIC STABILITY: HOUSING INSECURITY: IN THE LAST 12 MONTHS, HOW MANY PLACES HAVE YOU LIVED?: 1

## 2023-10-03 SDOH — SOCIAL STABILITY: SOCIAL NETWORK: HOW OFTEN DO YOU GET TOGETHER WITH FRIENDS OR RELATIVES?: ONCE A WEEK

## 2023-10-03 SDOH — SOCIAL STABILITY: SOCIAL NETWORK: IN A TYPICAL WEEK, HOW MANY TIMES DO YOU TALK ON THE PHONE WITH FAMILY, FRIENDS, OR NEIGHBORS?: TWICE A WEEK

## 2023-10-03 SDOH — SOCIAL STABILITY: SOCIAL NETWORK: HOW OFTEN DO YOU ATTEND CHURCH OR RELIGIOUS SERVICES?: NEVER

## 2023-10-03 SDOH — ECONOMIC STABILITY: TRANSPORTATION INSECURITY
IN THE PAST 12 MONTHS, HAS LACK OF TRANSPORTATION KEPT YOU FROM MEETINGS, WORK, OR FROM GETTING THINGS NEEDED FOR DAILY LIVING?: NO

## 2023-10-03 ASSESSMENT — PAIN - FUNCTIONAL ASSESSMENT: PAIN_FUNCTIONAL_ASSESSMENT: 0-10

## 2023-10-03 ASSESSMENT — ACTIVITIES OF DAILY LIVING (ADL)
PATIENT'S MEMORY ADEQUATE TO SAFELY COMPLETE DAILY ACTIVITIES?: YES
GROOMING: NEEDS ASSISTANCE
TOILETING: NEEDS ASSISTANCE
ASSISTIVE_DEVICE: EYEGLASSES
ADEQUATE_TO_COMPLETE_ADL: YES
FEEDING YOURSELF: INDEPENDENT
DRESSING YOURSELF: NEEDS ASSISTANCE
HEARING - LEFT EAR: FUNCTIONAL
HEARING - RIGHT EAR: FUNCTIONAL
BATHING: NEEDS ASSISTANCE
WALKS IN HOME: INDEPENDENT
JUDGMENT_ADEQUATE_SAFELY_COMPLETE_DAILY_ACTIVITIES: YES

## 2023-10-03 ASSESSMENT — COGNITIVE AND FUNCTIONAL STATUS - GENERAL
CLIMB 3 TO 5 STEPS WITH RAILING: A LOT
DRESSING REGULAR LOWER BODY CLOTHING: A LITTLE
DRESSING REGULAR LOWER BODY CLOTHING: A LITTLE
MOVING FROM LYING ON BACK TO SITTING ON SIDE OF FLAT BED WITH BEDRAILS: A LITTLE
MOVING TO AND FROM BED TO CHAIR: A LITTLE
MOBILITY SCORE: 16
TOILETING: A LOT
STANDING UP FROM CHAIR USING ARMS: A LITTLE
MOVING FROM LYING ON BACK TO SITTING ON SIDE OF FLAT BED WITH BEDRAILS: A LITTLE
TOILETING: A LOT
TURNING FROM BACK TO SIDE WHILE IN FLAT BAD: A LITTLE
CLIMB 3 TO 5 STEPS WITH RAILING: A LOT
HELP NEEDED FOR BATHING: A LOT
STANDING UP FROM CHAIR USING ARMS: A LITTLE
TURNING FROM BACK TO SIDE WHILE IN FLAT BAD: A LITTLE
MOVING TO AND FROM BED TO CHAIR: A LITTLE
DAILY ACTIVITIY SCORE: 18
HELP NEEDED FOR BATHING: A LOT
DAILY ACTIVITIY SCORE: 18
DRESSING REGULAR UPPER BODY CLOTHING: A LITTLE
WALKING IN HOSPITAL ROOM: A LOT
PATIENT BASELINE BEDBOUND: NO
DRESSING REGULAR UPPER BODY CLOTHING: A LITTLE

## 2023-10-03 ASSESSMENT — PATIENT HEALTH QUESTIONNAIRE - PHQ9
2. FEELING DOWN, DEPRESSED OR HOPELESS: MORE THAN HALF THE DAYS
1. LITTLE INTEREST OR PLEASURE IN DOING THINGS: MORE THAN HALF THE DAYS
SUM OF ALL RESPONSES TO PHQ9 QUESTIONS 1 & 2: 4

## 2023-10-03 ASSESSMENT — ENCOUNTER SYMPTOMS
DIFFICULTY URINATING: 1
HYPERACTIVE: 1
SHORTNESS OF BREATH: 1
SORE THROAT: 1
CONSTIPATION: 1
COLOR CHANGE: 1
EYE ITCHING: 0
WEAKNESS: 1
EYE PAIN: 0
NERVOUS/ANXIOUS: 1
WHEEZING: 1
EYE DISCHARGE: 0
ARTHRALGIAS: 1

## 2023-10-03 ASSESSMENT — LIFESTYLE VARIABLES
AUDIT-C TOTAL SCORE: 0
SKIP TO QUESTIONS 9-10: 1
PRESCIPTION_ABUSE_PAST_12_MONTHS: NO
HOW OFTEN DO YOU HAVE 6 OR MORE DRINKS ON ONE OCCASION: NEVER
SKIP TO QUESTIONS 9-10: 1
AUDIT-C TOTAL SCORE: 0
HOW MANY STANDARD DRINKS CONTAINING ALCOHOL DO YOU HAVE ON A TYPICAL DAY: PATIENT DOES NOT DRINK
AUDIT-C TOTAL SCORE: 0
HOW OFTEN DO YOU HAVE A DRINK CONTAINING ALCOHOL: NEVER
SUBSTANCE_ABUSE_PAST_12_MONTHS: NO

## 2023-10-03 ASSESSMENT — PAIN SCALES - GENERAL
PAINLEVEL_OUTOF10: 0 - NO PAIN
PAINLEVEL_OUTOF10: 2

## 2023-10-03 ASSESSMENT — COLUMBIA-SUICIDE SEVERITY RATING SCALE - C-SSRS
1. IN THE PAST MONTH, HAVE YOU WISHED YOU WERE DEAD OR WISHED YOU COULD GO TO SLEEP AND NOT WAKE UP?: YES
4. HAVE YOU HAD THESE THOUGHTS AND HAD SOME INTENTION OF ACTING ON THEM?: NO
6. HAVE YOU EVER DONE ANYTHING, STARTED TO DO ANYTHING, OR PREPARED TO DO ANYTHING TO END YOUR LIFE?: NO
6. HAVE YOU EVER DONE ANYTHING, STARTED TO DO ANYTHING, OR PREPARED TO DO ANYTHING TO END YOUR LIFE?: YES
2. HAVE YOU ACTUALLY HAD ANY THOUGHTS OF KILLING YOURSELF?: YES

## 2023-10-03 NOTE — H&P
History Of Present Illness  Qi Davila is a 82 y.o. female presenting with abdominal pain, cough, tired, and anxiety   Pt was seen in ED and she did test positive for covid, she has never been vaccinated  Pt started on decadron and admitted  Did get dose of remdesivir in the ED  No sick contacts  Lives alone  Per pt not much social support     Past Medical History  She has a past medical history of Personal history of other mental and behavioral disorders and Personal history of other specified conditions (01/10/2022).    Surgical History  She has a past surgical history that includes MR angio head wo IV contrast (1/5/2012).  hysterectomy     Social History  She reports that she has never smoked. She has never used smokeless tobacco. No history on file for alcohol use and drug use.    Family History  No family history on file.     Allergies  Hydrocodone, Hydrocodone-acetaminophen, Latex, Penicillins, and Prednisone    Review of Systems   HENT:  Positive for congestion and sore throat.    Eyes:  Negative for pain, discharge and itching.   Respiratory:  Positive for shortness of breath and wheezing.    Gastrointestinal:  Positive for constipation.   Genitourinary:  Positive for difficulty urinating.   Musculoskeletal:  Positive for arthralgias.   Skin:  Positive for color change.   Neurological:  Positive for weakness.   Psychiatric/Behavioral:  The patient is nervous/anxious and is hyperactive.         Physical Exam  Constitutional:       Appearance: Normal appearance. She is normal weight.   HENT:      Head: Normocephalic and atraumatic.      Nose: Nose normal.      Mouth/Throat:      Mouth: Mucous membranes are dry.   Eyes:      Extraocular Movements: Extraocular movements intact.      Conjunctiva/sclera: Conjunctivae normal.      Pupils: Pupils are equal, round, and reactive to light.   Cardiovascular:      Rate and Rhythm: Normal rate and regular rhythm.   Abdominal:      General: There is no distension.       Palpations: Abdomen is soft.      Tenderness: There is no abdominal tenderness. There is no right CVA tenderness or guarding.   Musculoskeletal:         General: Normal range of motion.      Cervical back: Normal range of motion and neck supple.   Skin:     General: Skin is warm and dry.   Neurological:      General: No focal deficit present.      Mental Status: She is alert and oriented to person, place, and time.   Psychiatric:      Comments: Anxious           Last Recorded Vitals  /72   Pulse 77   Temp 37 °C (98.6 °F)   Resp 18   Wt 88.5 kg (195 lb 1.7 oz)   SpO2 96%     Relevant Results  Scheduled medications  brimonidine, 1 drop, Both Eyes, q12h  busPIRone, 5 mg, oral, BID  clotrimazole-betamethasone, 1 Application, Topical, BID  cycloSPORINE, 1 drop, Both Eyes, q12h  enoxaparin, 40 mg, subcutaneous, Daily  latanoprost, 1 drop, Both Eyes, Nightly  levothyroxine, 25 mcg, oral, Daily  pantoprazole, 20 mg, oral, Daily before breakfast  polyethylene glycol, 17 g, oral, Daily  propranolol, 20 mg, oral, BID  QUEtiapine, 50 mg, oral, Nightly  [START ON 10/4/2023] remdesivir, 100 mg, intravenous, q24h  sertraline, 100 mg, oral, Daily  timolol, 1 drop, Both Eyes, Daily      Continuous medications     PRN medications  PRN medications: fluticasone, oxygen  Results for orders placed or performed during the hospital encounter of 10/02/23 (from the past 24 hour(s))   POCT GLUCOSE   Result Value Ref Range    POCT Glucose 98 74 - 99 mg/dL     Cxr noted         Assessment/Plan   Principal Problem:    COVID-19    Anxiety   Elevated BP  Could be related to stress    Pt seen dw ID   Cont with remdesivir  Dvt ppx loenox  Check labs in am  Consulted psychiatry   Add hydralazine prn for blood pressure           Angel Drew MD

## 2023-10-03 NOTE — PROGRESS NOTES
10/3/2023 [address] [place] [address] referral due to suicidal ideation. Patient covid positive. Came in with complaints of headache, depression and suicidal ideation.  She has history of treatment for depression and lives alone complains of constant thoughts of suicide. Epat saw patient in ED and felt she is  at high risk  for suicide and recommended psych inpatient. Dr Drew informed psych consult needed and now is complete. Anticipate psych eval and if pink slipped epat will be called when medical clear to dc. Lucita following

## 2023-10-03 NOTE — CONSULTS
"INFECTIOUS DISEASE INPATIENT INITIAL CONSULTATION    Referred By: Angel Drew    Reason For Consult: COVID-19    HPI:  This is a 82 y.o. female with PMH of depression presenting with suicidal ideation.    Patient is positive for COVID-19. Has some fever. She has been coughing. Not sure how long she has been having symptoms for but I see was negative for COVID on 9/28/23 so this is quite recent now.     She is not vaccinated for COVID and has no personal history of infection. Daughter is sick with COVID too.    She says has had issues with prednisone causing anxiety/nightmares in the past.     Allergies:  Hydrocodone, Hydrocodone-acetaminophen, Latex, Penicillins, and Prednisone     Vitals (Last 24 Hours):  Heart Rate:  [66-92]   Temp:  [36.6 °C (97.9 °F)-37.8 °C (100 °F)]   Resp:  [18-27]   BP: (105-181)/(54-99)   Height:  [162.6 cm (5' 4\")]   Weight:  [88.5 kg (195 lb 1.7 oz)]   SpO2:  [2 %-97 %]      PHYSICAL EXAM:  Gen - NAD  Heart - RRR  Lungs - clear bilaterally, no wheezing  Abd - soft, no ttp, BS present  Ext - no LE edema  Skin - no rash    MEDS:    Current Facility-Administered Medications:     brimonidine (AlphaGAN P) 0.2 % ophthalmic solution 1 drop, 1 drop, Both Eyes, q12h, Angel Drew MD, 1 drop at 10/03/23 0814    busPIRone (Buspar) tablet 5 mg, 5 mg, oral, BID, Angel Drew MD, 5 mg at 10/03/23 0814    clobetasol (Temovate) 0.05 % external solution 1 Application, 1 Application, Topical, BID, Angel Drew MD    clotrimazole-betamethasone (Lotrisone) cream 1 Application, 1 Application, Topical, BID, Angel Drew MD, 1 Application at 10/03/23 0814    cycloSPORINE (Restasis) 0.05 % ophthalmic emulsion 1 drop, 1 drop, Both Eyes, q12h, Angel Drew MD    dexAMETHasone (Decadron) tablet 6 mg, 6 mg, oral, Daily, Angel Drew MD    enoxaparin (Lovenox) syringe 40 mg, 40 mg, subcutaneous, Daily, Angel Drew MD    fluticasone (Flonase) nasal spray 1 spray, 1 spray, Each Nostril, Daily PRN, " Angel Drew MD    latanoprost (Xalatan) 0.005 % ophthalmic solution 1 drop, 1 drop, Both Eyes, Nightly, Angel Drew MD    levothyroxine (Synthroid, Levoxyl) tablet 25 mcg, 25 mcg, oral, Daily, Angel Drew MD, 25 mcg at 10/03/23 0814    pantoprazole (ProtoNix) EC tablet 20 mg, 20 mg, oral, Daily before breakfast, Angel Drew MD, 20 mg at 10/03/23 0814    propranolol (Inderal) tablet 20 mg, 20 mg, oral, BID, Angel Drew MD, 20 mg at 10/03/23 0814    QUEtiapine (SEROquel) tablet 50 mg, 50 mg, oral, Nightly, Angel Drew MD    sertraline (Zoloft) tablet 100 mg, 100 mg, oral, Daily, Angel Drew MD, 100 mg at 10/03/23 0814    timolol (Timoptic) 0.5 % ophthalmic solution 1 drop, 1 drop, Both Eyes, Daily, Angel Drew MD     LABS:  Lab Results   Component Value Date    WBC 6.4 10/02/2023    HGB 10.8 (L) 10/02/2023    HCT 31.9 (L) 10/02/2023    MCV 95 10/02/2023     10/02/2023      Results from last 72 hours   Lab Units 10/02/23  1425   SODIUM mmol/L 124*   POTASSIUM mmol/L 3.7   CHLORIDE mmol/L 94*   CO2 mmol/L 24   BUN mg/dL 5*   CREATININE mg/dL 0.48*   GLUCOSE mg/dL 98   CALCIUM mg/dL 8.4*   ANION GAP mmol/L 10   EGFR mL/min/1.73m*2 >90     Results from last 72 hours   Lab Units 10/02/23  1425   ALK PHOS U/L 51   BILIRUBIN TOTAL mg/dL 0.4   PROTEIN TOTAL g/dL 8.5*   ALT U/L 17   AST U/L 20   ALBUMIN g/dL 3.5     Estimated Creatinine Clearance: 93.9 mL/min (A) (by C-G formula based on SCr of 0.48 mg/dL (L)).    IMAGING:  CXR 10/2/23  IMPRESSION:  Cardiomegaly with diffuse interstitial pulmonary opacities suggestive  of pulmonary edema. Superimposed infection not excluded.    ASSESSMENT/PLAN:    COVID-19 Infection  Viral PNA - acute, hypoxic on 2L NC  Anxiety/Depression and SI    Will hold off on further steroids, concerned this may exacerbate her mental illness/anxiety right now.    IV Remdesivir 200mg given yesterday. Ordered for 100mg for 4 more days to start later today.    Will follow.  Thanks! D/w RN and Dr. Rajendra Feliz MD  [facility]  Office #[phone]

## 2023-10-04 PROBLEM — F41.9 ANXIETY: Status: ACTIVE | Noted: 2023-10-04

## 2023-10-04 PROBLEM — K86.9 PANCREATIC LESION (HHS-HCC): Status: ACTIVE | Noted: 2023-10-04

## 2023-10-04 PROCEDURE — 2500000001 HC RX 250 WO HCPCS SELF ADMINISTERED DRUGS (ALT 637 FOR MEDICARE OP): Performed by: PHYSICIAN ASSISTANT

## 2023-10-04 PROCEDURE — 2500000005 HC RX 250 GENERAL PHARMACY W/O HCPCS: Performed by: INTERNAL MEDICINE

## 2023-10-04 PROCEDURE — 2500000004 HC RX 250 GENERAL PHARMACY W/ HCPCS (ALT 636 FOR OP/ED): Performed by: PHYSICIAN ASSISTANT

## 2023-10-04 PROCEDURE — 1100000001 HC PRIVATE ROOM DAILY

## 2023-10-04 PROCEDURE — 2500000001 HC RX 250 WO HCPCS SELF ADMINISTERED DRUGS (ALT 637 FOR MEDICARE OP): Performed by: FAMILY MEDICINE

## 2023-10-04 PROCEDURE — 2500000004 HC RX 250 GENERAL PHARMACY W/ HCPCS (ALT 636 FOR OP/ED): Performed by: PSYCHIATRY & NEUROLOGY

## 2023-10-04 PROCEDURE — 99223 1ST HOSP IP/OBS HIGH 75: CPT | Performed by: PSYCHIATRY & NEUROLOGY

## 2023-10-04 PROCEDURE — 2500000004 HC RX 250 GENERAL PHARMACY W/ HCPCS (ALT 636 FOR OP/ED): Performed by: INTERNAL MEDICINE

## 2023-10-04 PROCEDURE — 2500000004 HC RX 250 GENERAL PHARMACY W/ HCPCS (ALT 636 FOR OP/ED): Performed by: FAMILY MEDICINE

## 2023-10-04 RX ORDER — SERTRALINE HYDROCHLORIDE 50 MG/1
50 TABLET, FILM COATED ORAL DAILY
Status: DISCONTINUED | OUTPATIENT
Start: 2023-10-05 | End: 2023-10-17 | Stop reason: HOSPADM

## 2023-10-04 RX ORDER — QUETIAPINE FUMARATE 25 MG/1
25 TABLET, FILM COATED ORAL NIGHTLY
Status: DISCONTINUED | OUTPATIENT
Start: 2023-10-04 | End: 2023-10-17 | Stop reason: HOSPADM

## 2023-10-04 RX ADMIN — ENOXAPARIN SODIUM 40 MG: 40 INJECTION SUBCUTANEOUS at 12:35

## 2023-10-04 RX ADMIN — PROPRANOLOL HYDROCHLORIDE 20 MG: 10 TABLET ORAL at 20:31

## 2023-10-04 RX ADMIN — PROCHLORPERAZINE EDISYLATE 10 MG: 5 INJECTION INTRAMUSCULAR; INTRAVENOUS at 23:17

## 2023-10-04 RX ADMIN — TIMOLOL MALEATE 1 DROP: 5 SOLUTION/ DROPS OPHTHALMIC at 09:00

## 2023-10-04 RX ADMIN — PROPRANOLOL HYDROCHLORIDE 20 MG: 10 TABLET ORAL at 12:18

## 2023-10-04 RX ADMIN — BUSPIRONE HYDROCHLORIDE 5 MG: 5 TABLET ORAL at 12:18

## 2023-10-04 RX ADMIN — CLOTRIMAZOLE AND BETAMETHASONE DIPROPIONATE 1 APPLICATION: 10; .64 CREAM TOPICAL at 09:00

## 2023-10-04 RX ADMIN — REMDESIVIR 100 MG: 100 INJECTION, POWDER, LYOPHILIZED, FOR SOLUTION INTRAVENOUS at 12:43

## 2023-10-04 RX ADMIN — BRIMONIDINE TARTRATE 1 DROP: 2 SOLUTION/ DROPS OPHTHALMIC at 05:54

## 2023-10-04 RX ADMIN — PANTOPRAZOLE SODIUM 20 MG: 20 TABLET, DELAYED RELEASE ORAL at 06:04

## 2023-10-04 RX ADMIN — Medication 3 MG: at 20:31

## 2023-10-04 RX ADMIN — QUETIAPINE FUMARATE 25 MG: 25 TABLET ORAL at 20:31

## 2023-10-04 RX ADMIN — CLOTRIMAZOLE AND BETAMETHASONE DIPROPIONATE 1 APPLICATION: 10; .64 CREAM TOPICAL at 20:31

## 2023-10-04 RX ADMIN — LEVOTHYROXINE SODIUM 25 MCG: 0.03 TABLET ORAL at 12:18

## 2023-10-04 RX ADMIN — POLYETHYLENE GLYCOL (3350) 17 G: 17 POWDER, FOR SOLUTION ORAL at 09:00

## 2023-10-04 RX ADMIN — SERTRALINE HYDROCHLORIDE 100 MG: 50 TABLET ORAL at 12:17

## 2023-10-04 ASSESSMENT — COGNITIVE AND FUNCTIONAL STATUS - GENERAL
DRESSING REGULAR UPPER BODY CLOTHING: A LITTLE
DRESSING REGULAR UPPER BODY CLOTHING: A LITTLE
DAILY ACTIVITIY SCORE: 19
MOBILITY SCORE: 20
DRESSING REGULAR LOWER BODY CLOTHING: A LITTLE
CLIMB 3 TO 5 STEPS WITH RAILING: A LITTLE
DAILY ACTIVITIY SCORE: 19
TOILETING: A LITTLE
PERSONAL GROOMING: A LITTLE
MOBILITY SCORE: 22
CLIMB 3 TO 5 STEPS WITH RAILING: A LOT
TOILETING: A LITTLE
DRESSING REGULAR LOWER BODY CLOTHING: A LITTLE
HELP NEEDED FOR BATHING: A LITTLE
STANDING UP FROM CHAIR USING ARMS: A LITTLE
PERSONAL GROOMING: A LITTLE
WALKING IN HOSPITAL ROOM: A LITTLE
WALKING IN HOSPITAL ROOM: A LITTLE
HELP NEEDED FOR BATHING: A LITTLE

## 2023-10-04 ASSESSMENT — PAIN SCALES - GENERAL: PAINLEVEL_OUTOF10: 0 - NO PAIN

## 2023-10-04 ASSESSMENT — PAIN - FUNCTIONAL ASSESSMENT: PAIN_FUNCTIONAL_ASSESSMENT: 0-10

## 2023-10-04 ASSESSMENT — COLUMBIA-SUICIDE SEVERITY RATING SCALE - C-SSRS
2. HAVE YOU ACTUALLY HAD ANY THOUGHTS OF KILLING YOURSELF?: NO
1. SINCE LAST CONTACT, HAVE YOU WISHED YOU WERE DEAD OR WISHED YOU COULD GO TO SLEEP AND NOT WAKE UP?: NO
6. HAVE YOU EVER DONE ANYTHING, STARTED TO DO ANYTHING, OR PREPARED TO DO ANYTHING TO END YOUR LIFE?: NO
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
## 2023-10-04 NOTE — PROGRESS NOTES
INPATIENT PROGRESS NOTES    PRIMARY SERVICE: Angel Drew MD         10/4/2023  11:02 AM    INTERVAL HPI: Pt feels OK,   No new complaints      PERTINENT ROS:  REVIEW OF SYSTEMS  GENERAL: negative for fever, SEE HPI  RESPIRATORY: Negative for cough, wheezing or shortness of breath.  CARDIOVASCULAR: Negative for chest pain, leg swelling or palpitations.  GI: Negative for abdominal discomfort, blood in stools or black stools or change in bowel habits  NEURO: no change per nursing  All other reviewed and negative other than HPI.      MEDICATIONS:    No current facility-administered medications on file prior to encounter.     Current Outpatient Medications on File Prior to Encounter   Medication Sig Dispense Refill    busPIRone (Buspar) 5 mg tablet Take 1 tablet (5 mg) by mouth 2 times a day.      clobetasol (Temovate) 0.05 % external solution Apply 1 Application topically 2 times a day.      clotrimazole-betamethasone (Lotrisone) cream Apply 1 Application topically 2 times a day.      famciclovir (Famvir) 500 mg tablet Take 1 tablet (500 mg) by mouth 1 time if needed. With oubreak      latanoprost (Xalatan) 0.005 % ophthalmic solution Administer 1 drop into both eyes once daily at bedtime.      levothyroxine (Synthroid, Levoxyl) 25 mcg tablet Take 1 tablet (25 mcg) by mouth once daily.      propranolol (Inderal) 20 mg tablet Take 1 tablet (20 mg) by mouth 2 times a day.      Restasis 0.05 % ophthalmic emulsion Administer 1 drop into both eyes every 12 hours.      sertraline (Zoloft) 50 mg tablet Take 2 tablets (100 mg) by mouth once daily.      Alphagan P 0.1 % ophthalmic solution Administer 1 drop into both eyes every 12 hours.      ciclopirox 1 % shampoo Apply 1 Application topically once daily at bedtime.      dexlansoprazole (Dexilant) 30 mg DR capsule Take 1 capsule (30 mg) by mouth once daily.      fish oil concentrate (Omega-3) 120-180 mg capsule Take 1 capsule (1 g) by mouth once daily.      fluticasone  (Flonase) 50 mcg/actuation nasal spray Administer 1 spray into each nostril once daily.      QUEtiapine (SEROquel) 50 mg tablet Take 1 tablet (50 mg) by mouth once daily at bedtime.      timolol (Timoptic) 0.5 % ophthalmic solution Administer 1 drop into both eyes once daily.           PHYSICAL EXAM:   Vitals:    10/03/23 2221 10/04/23 0452 10/04/23 0500 10/04/23 0812   BP: 154/72 (!) 181/78 169/63 168/63   BP Location: Left arm Left arm Left arm    Patient Position:  Lying     Pulse: 73 70  69   Resp: 18 18  18   Temp:  36.8 °C (98.2 °F)     TempSrc:  Temporal     SpO2: 91% 93%  95%   Weight:       Height:            PHYSICAL EXAMINATION:    General appearance: frail edlerly   Skin: skin color, texture, turgor normal,   HEENT: Anicteric sclera.  Oropharynx mucosa moist  Neck: Supple, no adenopathy;   Back: no pain to palpation over spine or costovertebral angles,   Lungs: clear to auscultation, no wheezing or rhonchi  Heart: RRR without murmur, gallop, or rubs.   Abdomen: Abdomen soft, non-tender. Bowel sounds normal. No masses, organomegaly  Extremities: Extremities normal. No  edema, or skin discoloration.   Musculoskeletal: Spine range of motion normal. Muscular strength intact  Neuro: Oriented X  3    DATA: CBC, Coags, BMP, Mg, Phos   Results for orders placed or performed during the hospital encounter of 10/02/23 (from the past 96 hour(s))   CBC and Auto Differential   Result Value Ref Range    WBC 6.4 4.4 - 11.3 x10*3/uL    nRBC 0.0 0.0 - 0.0 /100 WBCs    RBC 3.36 (L) 4.00 - 5.20 x10*6/uL    Hemoglobin 10.8 (L) 12.0 - 16.0 g/dL    Hematocrit 31.9 (L) 36.0 - 46.0 %    MCV 95 80 - 100 fL    MCH 32.1 26.0 - 34.0 pg    MCHC 33.9 32.0 - 36.0 g/dL    RDW 15.2 (H) 11.5 - 14.5 %    Platelets 221 150 - 450 x10*3/uL    MPV 11.8 (H) 7.5 - 11.5 fL    Neutrophils % 58.1 40.0 - 80.0 %    Immature Granulocytes %, Automated 0.5 0.0 - 0.9 %    Lymphocytes % 19.6 13.0 - 44.0 %    Monocytes % 19.9 2.0 - 10.0 %    Eosinophils %  1.1 0.0 - 6.0 %    Basophils % 0.8 0.0 - 2.0 %    Neutrophils Absolute 3.70 1.60 - 5.50 x10*3/uL    Immature Granulocytes Absolute, Automated 0.03 0.00 - 0.50 x10*3/uL    Lymphocytes Absolute 1.25 0.80 - 3.00 x10*3/uL    Monocytes Absolute 1.27 (H) 0.05 - 0.80 x10*3/uL    Eosinophils Absolute 0.07 0.00 - 0.40 x10*3/uL    Basophils Absolute 0.05 0.00 - 0.10 x10*3/uL   Comprehensive Metabolic Panel   Result Value Ref Range    Glucose 98 74 - 99 mg/dL    Sodium 124 (L) 136 - 145 mmol/L    Potassium 3.7 3.5 - 5.3 mmol/L    Chloride 94 (L) 98 - 107 mmol/L    Bicarbonate 24 21 - 32 mmol/L    Anion Gap 10 10 - 20 mmol/L    Urea Nitrogen 5 (L) 6 - 23 mg/dL    Creatinine 0.48 (L) 0.50 - 1.05 mg/dL    eGFR >90 >60 mL/min/1.73m*2    Calcium 8.4 (L) 8.6 - 10.3 mg/dL    Albumin 3.5 3.4 - 5.0 g/dL    Alkaline Phosphatase 51 33 - 136 U/L    Total Protein 8.5 (H) 6.4 - 8.2 g/dL    AST 20 9 - 39 U/L    Bilirubin, Total 0.4 0.0 - 1.2 mg/dL    ALT 17 7 - 45 U/L   Acute Toxicology Panel, Blood   Result Value Ref Range    Acetaminophen <10.0 10.0 - 30.0 ug/mL    Salicylate  <3 4 - 20 mg/dL    Alcohol <10 <=10 mg/dL   Sars-CoV-2 PCR, Symptomatic   Result Value Ref Range    Coronavirus 2019, PCR Detected (AA) Not Detected   POCT GLUCOSE   Result Value Ref Range    POCT Glucose 98 74 - 99 mg/dL           ASSESSMENT AND PLAN:     COVID   Anxiety   Elevated BP  Could be related to stress    -Input from ID noted, Cont with remdesivir. No plan for steroid due to rxn in past  [anxiety]  -Consulted psychiatry , await input     Add hydralazine prn for blood pressure    VTE Prophylaxis  -See Orders    -Continue current treatment as ordered. Will make adjustments as necessary.    Plan of care discussed with: Provider, RN, Patient.      Patient case and plan of care discussed with Dr. JESSICA Drew.    TREASURE Honeycutt - CNP  -In collaboration with Dr. JESSICA Drew    [facility]  Office: [phone]  Fax:  [phone]   I have reviewed the above note obtained and documented by the NP/PA and I personally participated in the key components. I have discussed the case and management of the patient's care. Changes made to the note, and all key components of history and physical/progress note done by me.  dw nursing  Psych consulted  Noted input  Cont with remdesivir  Will need MRI pancreas as outpt  Angel Drew MD

## 2023-10-04 NOTE — PROGRESS NOTES
"  INFECTIOUS DISEASE DAILY PROGRESS NOTE    SUBJECTIVE:    No overnight events. No new complaints. Afebrile. No rash/itching/diarrhea.    OBJECTIVE:  VITALS (Last 24 Hours)  /69   Pulse 69   Temp 37 °C (98.6 °F)   Resp 18   Ht 1.626 m (5' 4\")   Wt 88.5 kg (195 lb 1.7 oz)   SpO2 98%   BMI 33.49 kg/m²     PHYSICAL EXAM:  Gen - NAD, laying in bed, not on O2  Lungs - no wheezing  Skin - no rash    MEDS:    Current Facility-Administered Medications:     brimonidine (AlphaGAN P) 0.2 % ophthalmic solution 1 drop, 1 drop, Both Eyes, q12h, Angel Drew MD, 1 drop at 10/04/23 0554    busPIRone (Buspar) tablet 5 mg, 5 mg, oral, BID, Angel Drew MD, 5 mg at 10/04/23 1218    clotrimazole-betamethasone (Lotrisone) cream 1 Application, 1 Application, Topical, BID, Angel Drew MD, 1 Application at 10/04/23 0900    cycloSPORINE (Restasis) 0.05 % ophthalmic emulsion 1 drop, 1 drop, Both Eyes, q12h, Angel Drew MD, 1 drop at 10/03/23 1811    enoxaparin (Lovenox) syringe 40 mg, 40 mg, subcutaneous, Daily, Angel Drew MD, 40 mg at 10/04/23 1235    fluticasone (Flonase) nasal spray 1 spray, 1 spray, Each Nostril, Daily PRN, Angel Drew MD    latanoprost (Xalatan) 0.005 % ophthalmic solution 1 drop, 1 drop, Both Eyes, Nightly, Angel Drew MD    levothyroxine (Synthroid, Levoxyl) tablet 25 mcg, 25 mcg, oral, Daily, Angel Drew MD, 25 mcg at 10/04/23 1218    melatonin tablet 3 mg, 3 mg, oral, Nightly, Destini Ramirez PA-C, 3 mg at 10/03/23 2321    oxygen (O2) therapy, , inhalation, Continuous PRN - O2/gases, Angel Drew MD    pantoprazole (ProtoNix) EC tablet 20 mg, 20 mg, oral, Daily before breakfast, Angel Drew MD, 20 mg at 10/04/23 0604    polyethylene glycol (Glycolax, Miralax) packet 17 g, 17 g, oral, Daily, Angel Drew MD, 17 g at 10/04/23 0900    prochlorperazine (Compazine) injection 10 mg, 10 mg, intravenous, q6h PRN, Destini Ramirez PA-C, 10 mg at 10/03/23 1875    propranolol " (Inderal) tablet 20 mg, 20 mg, oral, BID, Angel Drew MD, 20 mg at 10/04/23 1218    QUEtiapine (SEROquel) tablet 50 mg, 50 mg, oral, Nightly, Angel Drew MD, 50 mg at 10/03/23 2158    remdesivir (Veklury) 100 mg in sodium chloride 0.9% 250 mL IV, 100 mg, intravenous, q24h, Bharat Feliz MD, Last Rate: 250 mL/hr at 10/04/23 1243, 100 mg at 10/04/23 1243    sertraline (Zoloft) tablet 100 mg, 100 mg, oral, Daily, Angel Drew MD, 100 mg at 10/04/23 1217    timolol (Timoptic) 0.5 % ophthalmic solution 1 drop, 1 drop, Both Eyes, Daily, Angel Drew MD, 1 drop at 10/04/23 0900    LABS:  Lab Results   Component Value Date    WBC 6.4 10/02/2023    HGB 10.8 (L) 10/02/2023    HCT 31.9 (L) 10/02/2023    MCV 95 10/02/2023     10/02/2023     Lab Results   Component Value Date    GLUCOSE 98 10/02/2023    CALCIUM 8.4 (L) 10/02/2023     (L) 10/02/2023    K 3.7 10/02/2023    CO2 24 10/02/2023    CL 94 (L) 10/02/2023    BUN 5 (L) 10/02/2023    CREATININE 0.48 (L) 10/02/2023     Results from last 72 hours   Lab Units 10/02/23  1425   ALK PHOS U/L 51   BILIRUBIN TOTAL mg/dL 0.4   PROTEIN TOTAL g/dL 8.5*   ALT U/L 17   AST U/L 20   ALBUMIN g/dL 3.5     Estimated Creatinine Clearance: 93.9 mL/min (A) (by C-G formula based on SCr of 0.48 mg/dL (L)).      ASSESSMENT/PLAN:     COVID-19 Infection  Viral PNA - acute, hypoxic on 2L NC  Anxiety/Depression and SI     OK to remain off steroids. She has been stable and was not on O2 this morning.    IV Remdesivir 100mg/day now D3/5. If she stays off O2 and is doing well then OK to discharge home, doesn't have to complete full 5D course Rem.    Will sign off. Please call back with questions. Thanks!    Bharat eFliz MD  [facility]  Office #[phone]

## 2023-10-05 LAB — GLUCOSE BLD MANUAL STRIP-MCNC: 102 MG/DL (ref 74–99)

## 2023-10-05 PROCEDURE — 2500000001 HC RX 250 WO HCPCS SELF ADMINISTERED DRUGS (ALT 637 FOR MEDICARE OP): Performed by: FAMILY MEDICINE

## 2023-10-05 PROCEDURE — 2500000004 HC RX 250 GENERAL PHARMACY W/ HCPCS (ALT 636 FOR OP/ED): Performed by: PSYCHIATRY & NEUROLOGY

## 2023-10-05 PROCEDURE — 2500000004 HC RX 250 GENERAL PHARMACY W/ HCPCS (ALT 636 FOR OP/ED): Performed by: INTERNAL MEDICINE

## 2023-10-05 PROCEDURE — 2500000004 HC RX 250 GENERAL PHARMACY W/ HCPCS (ALT 636 FOR OP/ED): Performed by: PHYSICIAN ASSISTANT

## 2023-10-05 PROCEDURE — 2500000001 HC RX 250 WO HCPCS SELF ADMINISTERED DRUGS (ALT 637 FOR MEDICARE OP): Performed by: PHYSICIAN ASSISTANT

## 2023-10-05 PROCEDURE — 2500000004 HC RX 250 GENERAL PHARMACY W/ HCPCS (ALT 636 FOR OP/ED): Performed by: NURSE PRACTITIONER

## 2023-10-05 PROCEDURE — 2500000005 HC RX 250 GENERAL PHARMACY W/O HCPCS: Performed by: INTERNAL MEDICINE

## 2023-10-05 PROCEDURE — 92610 EVALUATE SWALLOWING FUNCTION: CPT | Mod: GN | Performed by: SPEECH-LANGUAGE PATHOLOGIST

## 2023-10-05 PROCEDURE — 2500000004 HC RX 250 GENERAL PHARMACY W/ HCPCS (ALT 636 FOR OP/ED): Performed by: FAMILY MEDICINE

## 2023-10-05 PROCEDURE — 82947 ASSAY GLUCOSE BLOOD QUANT: CPT

## 2023-10-05 PROCEDURE — 1100000001 HC PRIVATE ROOM DAILY

## 2023-10-05 PROCEDURE — 99233 SBSQ HOSP IP/OBS HIGH 50: CPT | Performed by: PSYCHIATRY & NEUROLOGY

## 2023-10-05 RX ORDER — TAMSULOSIN HYDROCHLORIDE 0.4 MG/1
0.4 CAPSULE ORAL NIGHTLY
Status: DISCONTINUED | OUTPATIENT
Start: 2023-10-05 | End: 2023-10-17 | Stop reason: HOSPADM

## 2023-10-05 RX ORDER — SERTRALINE HYDROCHLORIDE 50 MG/1
25 TABLET, FILM COATED ORAL NIGHTLY
Status: COMPLETED | OUTPATIENT
Start: 2023-10-05 | End: 2023-10-05

## 2023-10-05 RX ADMIN — POLYETHYLENE GLYCOL (3350) 17 G: 17 POWDER, FOR SOLUTION ORAL at 10:35

## 2023-10-05 RX ADMIN — PROPRANOLOL HYDROCHLORIDE 20 MG: 10 TABLET ORAL at 10:35

## 2023-10-05 RX ADMIN — BRIMONIDINE TARTRATE 1 DROP: 2 SOLUTION/ DROPS OPHTHALMIC at 05:45

## 2023-10-05 RX ADMIN — Medication 3 MG: at 21:48

## 2023-10-05 RX ADMIN — ENOXAPARIN SODIUM 40 MG: 40 INJECTION SUBCUTANEOUS at 10:35

## 2023-10-05 RX ADMIN — PROPRANOLOL HYDROCHLORIDE 20 MG: 10 TABLET ORAL at 21:48

## 2023-10-05 RX ADMIN — THIAMINE HYDROCHLORIDE 500 MG: 100 INJECTION, SOLUTION INTRAMUSCULAR; INTRAVENOUS at 10:32

## 2023-10-05 RX ADMIN — TAMSULOSIN HYDROCHLORIDE 0.4 MG: 0.4 CAPSULE ORAL at 21:47

## 2023-10-05 RX ADMIN — CYCLOSPORINE 1 DROP: 0.5 EMULSION OPHTHALMIC at 05:45

## 2023-10-05 RX ADMIN — REMDESIVIR 100 MG: 100 INJECTION, POWDER, LYOPHILIZED, FOR SOLUTION INTRAVENOUS at 18:00

## 2023-10-05 RX ADMIN — SERTRALINE HYDROCHLORIDE 50 MG: 50 TABLET ORAL at 10:35

## 2023-10-05 RX ADMIN — SERTRALINE HYDROCHLORIDE 25 MG: 50 TABLET ORAL at 21:48

## 2023-10-05 RX ADMIN — PROCHLORPERAZINE EDISYLATE 10 MG: 5 INJECTION INTRAMUSCULAR; INTRAVENOUS at 21:47

## 2023-10-05 RX ADMIN — PANTOPRAZOLE SODIUM 20 MG: 20 TABLET, DELAYED RELEASE ORAL at 07:00

## 2023-10-05 RX ADMIN — CLOTRIMAZOLE AND BETAMETHASONE DIPROPIONATE 1 APPLICATION: 10; .64 CREAM TOPICAL at 10:35

## 2023-10-05 RX ADMIN — CLOTRIMAZOLE AND BETAMETHASONE DIPROPIONATE 1 APPLICATION: 10; .64 CREAM TOPICAL at 21:00

## 2023-10-05 RX ADMIN — LEVOTHYROXINE SODIUM 25 MCG: 0.03 TABLET ORAL at 10:35

## 2023-10-05 RX ADMIN — LATANOPROST 1 DROP: 50 SOLUTION OPHTHALMIC at 21:00

## 2023-10-05 RX ADMIN — BRIMONIDINE TARTRATE 1 DROP: 2 SOLUTION/ DROPS OPHTHALMIC at 17:45

## 2023-10-05 RX ADMIN — QUETIAPINE FUMARATE 25 MG: 25 TABLET ORAL at 21:48

## 2023-10-05 RX ADMIN — TIMOLOL MALEATE 1 DROP: 5 SOLUTION/ DROPS OPHTHALMIC at 10:37

## 2023-10-05 ASSESSMENT — COGNITIVE AND FUNCTIONAL STATUS - GENERAL
STANDING UP FROM CHAIR USING ARMS: A LITTLE
DRESSING REGULAR LOWER BODY CLOTHING: A LITTLE
STANDING UP FROM CHAIR USING ARMS: A LITTLE
TOILETING: A LITTLE
TOILETING: A LITTLE
HELP NEEDED FOR BATHING: A LITTLE
WALKING IN HOSPITAL ROOM: A LITTLE
HELP NEEDED FOR BATHING: A LITTLE
CLIMB 3 TO 5 STEPS WITH RAILING: A LOT
DAILY ACTIVITIY SCORE: 19
WALKING IN HOSPITAL ROOM: A LITTLE
MOBILITY SCORE: 20
WALKING IN HOSPITAL ROOM: A LITTLE
MOBILITY SCORE: 20
DRESSING REGULAR UPPER BODY CLOTHING: A LITTLE
DRESSING REGULAR LOWER BODY CLOTHING: A LITTLE
CLIMB 3 TO 5 STEPS WITH RAILING: A LOT
PERSONAL GROOMING: A LITTLE
DRESSING REGULAR LOWER BODY CLOTHING: A LITTLE
CLIMB 3 TO 5 STEPS WITH RAILING: A LOT
PERSONAL GROOMING: A LITTLE
DAILY ACTIVITIY SCORE: 19
STANDING UP FROM CHAIR USING ARMS: A LITTLE
DAILY ACTIVITIY SCORE: 19
HELP NEEDED FOR BATHING: A LITTLE
TOILETING: A LITTLE
MOBILITY SCORE: 20
PERSONAL GROOMING: A LITTLE

## 2023-10-05 ASSESSMENT — COLUMBIA-SUICIDE SEVERITY RATING SCALE - C-SSRS
1. SINCE LAST CONTACT, HAVE YOU WISHED YOU WERE DEAD OR WISHED YOU COULD GO TO SLEEP AND NOT WAKE UP?: NO
6. HAVE YOU EVER DONE ANYTHING, STARTED TO DO ANYTHING, OR PREPARED TO DO ANYTHING TO END YOUR LIFE?: NO
2. HAVE YOU ACTUALLY HAD ANY THOUGHTS OF KILLING YOURSELF?: NO
6. HAVE YOU EVER DONE ANYTHING, STARTED TO DO ANYTHING, OR PREPARED TO DO ANYTHING TO END YOUR LIFE?: NO
1. SINCE LAST CONTACT, HAVE YOU WISHED YOU WERE DEAD OR WISHED YOU COULD GO TO SLEEP AND NOT WAKE UP?: NO
2. HAVE YOU ACTUALLY HAD ANY THOUGHTS OF KILLING YOURSELF?: NO

## 2023-10-05 ASSESSMENT — PAIN SCALES - GENERAL
PAINLEVEL_OUTOF10: 0 - NO PAIN
PAINLEVEL_OUTOF10: 0 - NO PAIN

## 2023-10-05 ASSESSMENT — PAIN - FUNCTIONAL ASSESSMENT
PAIN_FUNCTIONAL_ASSESSMENT: 0-10
PAIN_FUNCTIONAL_ASSESSMENT: 0-10

## 2023-10-05 NOTE — CARE PLAN
The patient's goals for the shift include      The clinical goals for the shift include improve covid symptoms    Over the shift, the patient did not make progress toward the following goals. Barriers to progression include   Problem: Risk for Suicide  Goal: No self harm this shift  Outcome: Progressing  Goal: Read Safety Guidelines this shift  Outcome: Progressing  Goal: Complete Mental Health Safety Plan (psychiatry only) this shift  Outcome: Progressing     Problem: Nutrition  Goal: Oral intake greater than 50%  Outcome: Progressing  Goal: Oral intake greater 75%  Outcome: Progressing  Goal: Consume prescribed supplement  Outcome: Progressing  Goal: Adequate PO fluid intake  Outcome: Progressing  Goal: Nutrition support goals are met within 48 hrs  Outcome: Progressing  Goal: Nutrition support is meeting 75% of nutrient needs  Outcome: Progressing  Goal: Tube feed tolerance  Outcome: Progressing  Goal: BG  mg/dL  Outcome: Progressing  Goal: Lab values WNL  Outcome: Progressing  Goal: Electrolytes WNL  Outcome: Progressing  Goal: Promote healing  Outcome: Progressing  Goal: Maintain stable weight  Outcome: Progressing  Goal: Reduce weight from edema/fluid  Outcome: Progressing  Goal: Gradual weight gain  Outcome: Progressing  Goal: Improve ostomy output  Outcome: Progressing   . Recommendations to address these barriers include .

## 2023-10-05 NOTE — CONSULTS
Nutrition Assessment Note  Assessment    Assessment:  Reason for Assessment  Reason for Assessment: Provider consult order    Pt admitted for COVID-19    Chart reviewed and pt contacted via phone. Per pt poor intake since admission, eats regular at home. No wt changes to note.     Pt agreeable to supplement while admitted.       Scheduled medications  brimonidine, 1 drop, Both Eyes, q12h  clotrimazole-betamethasone, 1 Application, Topical, BID  cycloSPORINE, 1 drop, Both Eyes, q12h  enoxaparin, 40 mg, subcutaneous, Daily  latanoprost, 1 drop, Both Eyes, Nightly  levothyroxine, 25 mcg, oral, Daily  melatonin, 3 mg, oral, Nightly  pantoprazole, 20 mg, oral, Daily before breakfast  polyethylene glycol, 17 g, oral, Daily  propranolol, 20 mg, oral, BID  QUEtiapine, 25 mg, oral, Nightly  remdesivir, 100 mg, intravenous, q24h  sertraline, 50 mg, oral, Daily  tamsulosin, 0.4 mg, oral, Nightly  thiamine, 500 mg, intravenous, Daily  timolol, 1 drop, Both Eyes, Daily      Continuous medications     PRN medications  PRN medications: fluticasone, oxygen, prochlorperazine   Dietary Orders (From admission, onward)       Start     Ordered    10/05/23 1135  Oral nutritional supplements  Until discontinued        Comments: DO NOT SEND Ensure Clear, please send Alexandria melo   Question Answer Comment   Deliver with All meals    Select supplement: Ensure Clear        10/05/23 1138    10/03/23 0647  May Participate in Room Service With Assistance  Once        Question:  .  Answer:  Yes    10/03/23 0646    10/03/23 0646  Diet Tray Safety tray  Until discontinued        Question:  Select tray type:  Answer:  Safety tray    10/03/23 0646    10/03/23 0544  Adult diet Regular  Diet effective now        Question:  Diet type  Answer:  Regular    10/03/23 0544                     History:  Food and Nutrient History  Energy Intake: Poor < 50 %  Food and Nutrient History: 1 meal a day x 2 days    Anthropometrics:     Weight Change  Weight  History / % Weight Change: 83.5kg 10/2/23, 83.5kg 9/8/23  Significant Weight Loss: No (admission wt different than floor wt, potential to be inaccurate)      Estimated Energy Needs  Total Energy Estimated Needs (kCal): 2088 kCal  Total Estimated Energy Need per Day (kCal/kg): 2515 kCal/kg  Method for Estimating Needs: 25-30    Estimated Protein Needs  Total Protein Estimated Needs (g): 67 g  Total Protein Estimated Needs (g/kg): 84 g/kg  Method for Estimating Needs: 0.8-1.0    Estimated Fluid Needs  Method for Estimating Needs: 1ml/kcal or per MD    Nutrition Focused Physical Findings: Deferred: COVID+    Physical Findings (Nutrition Deficiency/Toxicity)  Skin: Negative    Diagnosis   Diagnosis:     Patient has Nutrition Diagnosis: Yes  Nutrition Diagnosis 1: Inadequate oral intake  Diagnosis Status (1): New  Related to (1): acute on chronic illness  As Evidenced by (1): poor intake since admission       Interventions/Recommendations   Interventions/Recommendations:  Alexandria Pérez TIALMA ROSA for encouraged intake.   Consider appetite stimulant   Monitor diet and supplement intake and tolerance  Daily weights  Labs as clinically indicated     Coordination of Nutrition Care by a Nutrition Professional  Collaboration and Referral of Nutrition Care: Collaboration by nutrition professional with other nutrition professionals      Monitoring and Evaluation   Monitoring/Evaluation:  Food and Nutrient Related History  Energy Intake: Estimated energy intake  Fluid Intake: Estimated fluid intake  Amount of Food: Estimated amout of food  Mealtime Behavior: Percent of meal time spent eating         Follow Up  Time Spent (min): 60 minutes  Last Date of Nutrition Visit: 10/05/23  Nutrition Follow-Up Needed?: Dietitian to reassess per policy  Follow up Comment: NA full assessment

## 2023-10-05 NOTE — PROGRESS NOTES
INPATIENT PROGRESS NOTES    PRIMARY SERVICE: Angel Drew MD         10/5/2023  7:58 AM    INTERVAL HPI: Pt feels so so  Did not sleep last night    Malaise+      PERTINENT ROS:  REVIEW OF SYSTEMS  GENERAL: negative for fever, SEE HPI  RESPIRATORY: Negative for cough, wheezing or shortness of breath.  CARDIOVASCULAR: Negative for chest pain, leg swelling or palpitations.  GI: Negative for abdominal discomfort, blood in stools or black stools or change in bowel habits  NEURO: no change per nursing  All other reviewed and negative other than HPI.      MEDICATIONS:    No current facility-administered medications on file prior to encounter.     Current Outpatient Medications on File Prior to Encounter   Medication Sig Dispense Refill    busPIRone (Buspar) 5 mg tablet Take 1 tablet (5 mg) by mouth 2 times a day.      clobetasol (Temovate) 0.05 % external solution Apply 1 Application topically 2 times a day.      clotrimazole-betamethasone (Lotrisone) cream Apply 1 Application topically 2 times a day.      famciclovir (Famvir) 500 mg tablet Take 1 tablet (500 mg) by mouth 1 time if needed. With oubreak      latanoprost (Xalatan) 0.005 % ophthalmic solution Administer 1 drop into both eyes once daily at bedtime.      levothyroxine (Synthroid, Levoxyl) 25 mcg tablet Take 1 tablet (25 mcg) by mouth once daily.      propranolol (Inderal) 20 mg tablet Take 1 tablet (20 mg) by mouth 2 times a day.      Restasis 0.05 % ophthalmic emulsion Administer 1 drop into both eyes every 12 hours.      sertraline (Zoloft) 50 mg tablet Take 2 tablets (100 mg) by mouth once daily.      Alphagan P 0.1 % ophthalmic solution Administer 1 drop into both eyes every 12 hours.      ciclopirox 1 % shampoo Apply 1 Application topically once daily at bedtime.      dexlansoprazole (Dexilant) 30 mg DR capsule Take 1 capsule (30 mg) by mouth once daily.      fish oil concentrate (Omega-3) 120-180 mg capsule Take 1 capsule (1 g) by mouth once daily.       fluticasone (Flonase) 50 mcg/actuation nasal spray Administer 1 spray into each nostril once daily.      QUEtiapine (SEROquel) 50 mg tablet Take 1 tablet (50 mg) by mouth once daily at bedtime.      timolol (Timoptic) 0.5 % ophthalmic solution Administer 1 drop into both eyes once daily.           PHYSICAL EXAM:   Vitals:    10/04/23 1630 10/04/23 2031 10/04/23 2300 10/05/23 0300   BP: 151/60 176/65 140/76 (!) 184/77   BP Location:       Patient Position:   Lying Lying   Pulse: 71 67 70 80   Resp: 18  18 18   Temp: 36.8 °C (98.2 °F)  36.2 °C (97.2 °F) 36.3 °C (97.4 °F)   TempSrc:   Temporal Temporal   SpO2: 96%  95% 90%   Weight:       Height:            PHYSICAL EXAMINATION:    General appearance: frail edlerly   Skin: skin color, texture, turgor normal,   HEENT: Anicteric sclera.  Oropharynx mucosa moist  Neck: Supple, no adenopathy;   Back: no pain to palpation over spine or costovertebral angles,   Lungs: clear to auscultation, no wheezing or rhonchi  Heart: RRR without murmur, gallop, or rubs.   Abdomen: Abdomen soft, non-tender. Bowel sounds normal. No masses, organomegaly  Extremities: Extremities normal. No  edema, or skin discoloration.   Musculoskeletal: Spine range of motion normal. Muscular strength intact  Neuro: Oriented X  3    DATA: CBC, Coags, BMP, Mg, Phos   Results for orders placed or performed during the hospital encounter of 10/02/23 (from the past 96 hour(s))   CBC and Auto Differential   Result Value Ref Range    WBC 6.4 4.4 - 11.3 x10*3/uL    nRBC 0.0 0.0 - 0.0 /100 WBCs    RBC 3.36 (L) 4.00 - 5.20 x10*6/uL    Hemoglobin 10.8 (L) 12.0 - 16.0 g/dL    Hematocrit 31.9 (L) 36.0 - 46.0 %    MCV 95 80 - 100 fL    MCH 32.1 26.0 - 34.0 pg    MCHC 33.9 32.0 - 36.0 g/dL    RDW 15.2 (H) 11.5 - 14.5 %    Platelets 221 150 - 450 x10*3/uL    MPV 11.8 (H) 7.5 - 11.5 fL    Neutrophils % 58.1 40.0 - 80.0 %    Immature Granulocytes %, Automated 0.5 0.0 - 0.9 %    Lymphocytes % 19.6 13.0 - 44.0 %     Monocytes % 19.9 2.0 - 10.0 %    Eosinophils % 1.1 0.0 - 6.0 %    Basophils % 0.8 0.0 - 2.0 %    Neutrophils Absolute 3.70 1.60 - 5.50 x10*3/uL    Immature Granulocytes Absolute, Automated 0.03 0.00 - 0.50 x10*3/uL    Lymphocytes Absolute 1.25 0.80 - 3.00 x10*3/uL    Monocytes Absolute 1.27 (H) 0.05 - 0.80 x10*3/uL    Eosinophils Absolute 0.07 0.00 - 0.40 x10*3/uL    Basophils Absolute 0.05 0.00 - 0.10 x10*3/uL   Comprehensive Metabolic Panel   Result Value Ref Range    Glucose 98 74 - 99 mg/dL    Sodium 124 (L) 136 - 145 mmol/L    Potassium 3.7 3.5 - 5.3 mmol/L    Chloride 94 (L) 98 - 107 mmol/L    Bicarbonate 24 21 - 32 mmol/L    Anion Gap 10 10 - 20 mmol/L    Urea Nitrogen 5 (L) 6 - 23 mg/dL    Creatinine 0.48 (L) 0.50 - 1.05 mg/dL    eGFR >90 >60 mL/min/1.73m*2    Calcium 8.4 (L) 8.6 - 10.3 mg/dL    Albumin 3.5 3.4 - 5.0 g/dL    Alkaline Phosphatase 51 33 - 136 U/L    Total Protein 8.5 (H) 6.4 - 8.2 g/dL    AST 20 9 - 39 U/L    Bilirubin, Total 0.4 0.0 - 1.2 mg/dL    ALT 17 7 - 45 U/L   Acute Toxicology Panel, Blood   Result Value Ref Range    Acetaminophen <10.0 10.0 - 30.0 ug/mL    Salicylate  <3 4 - 20 mg/dL    Alcohol <10 <=10 mg/dL   Sars-CoV-2 PCR, Symptomatic   Result Value Ref Range    Coronavirus 2019, PCR Detected (AA) Not Detected   POCT GLUCOSE   Result Value Ref Range    POCT Glucose 98 74 - 99 mg/dL           ASSESSMENT AND PLAN:     COVID   Anxiety   Elevated BP  Could be related to stress    -Input from ID noted, Cont with remdesivir. No plan for steroid due to rxn in past  [anxiety]  -Consulted psychiatry , input noted. Cont w med adjustments per psyche. Added IV thiamine per reccs. Added tamsulosin   -Speech eval  -PTOT, MOCA  -Continue current treatment as ordered. Will make adjustments as necessary.        MRI of pancreas as out patient     C/w  hydralazine prn for blood pressure    VTE Prophylaxis  -See Orders    -Continue current treatment as ordered. Will make adjustments as  necessary.    Plan of care discussed with: Provider, RN, Patient.      Patient case and plan of care discussed with Dr. JESSICA Drew.    Jose Carlisle, TREASURE - CNP  -In collaboration with Dr. JESSICA Drew    [facility]  Office: [phone]  Fax: [phone]   Pt seen this afternoon  Dw NP  Agree with above  She was feeling very hot   Temp in room 78  Chest clear  [employer]S regular  Ext no edema  Labs noted  A/p pt with covid  Depression   Urinary retention pt has de la garza   Noted input from psychiatry  Pt will need MRI pancreas as outpt  Ot pt   Decreased temp in room  Will follow  Angel Drew MD

## 2023-10-05 NOTE — CARE PLAN
SW  following pt. Per  10/4  psych note, pt  will continue  to be  assessed for IP psych.    Cassie Sam, MSW, LSW

## 2023-10-05 NOTE — PROGRESS NOTES
Care Coordinator Note:  Qi Davila is a 82 y.o. female on day 3 of admission presenting with COVID-19.  Patient to receive IV Thiamine for 3 days and then reassess for psych inpatient.  Anya Hall RN TCC

## 2023-10-05 NOTE — PROGRESS NOTES
"Inpatient Speech-Language Pathology Clinical Swallow Evaluation    Patient Name: Qi Davila  MRN: [id number]  Today's Date: 10/5/2023             Current Problem:   Patient Active Problem List   Diagnosis    COVID-19    Anxiety    Pancreatic lesion         Recommendations:  Additional Recommendations: Esophogram  Solid Diet Recommendations : Regular (IDDSI Level 7)  Liquid Diet Recommendations: Thin (IDDSI Level 0)  Compensatory Swallowing Strategies: Upright 90 degrees as possible for all oral intake, Remain upright for 20-30 minutes after meals      Assessment:  Assessment Results: Pt OME unremarkable. CSE trials consisted of ice cube x1 via spoon, consecutive straw sips of thin, puree via spoon x2, regular solid x1 with an additional straw sip of thin to rinse out any oral residue. No overt s/s of aspiration however patient reported a sticking feeling as she pointed to base of neck and would belch following all trials which is suspicious for esophageal dysfunction. Pt also reported that at home a lot of the time what she ate or drank would \"come back up\" however this was not observed during CSE.  Prognosis: Good  Treatment Provided: No  Strengths: Cognition      Plan:  Inpatient/Swing Bed or Outpatient: Inpatient  Treatment/Interventions:  (Complete esophagram)  SLP Plan: No skilled SLP  SLP Discharge Recommendations: Other (Comment)  Solid Consistency: Regular (IDDSI Level 7)  Liquid Consistency: Thin (IDDSI Level 0)  Discussed POC: Patient  Discussed Risks/Benefits: Patient  Patient/Caregiver Agreeable: Yes  SLP - OK to Discharge: Yes        General Visit Information:  Patient Class: Inpatient  Living Environment: Lives alone  Ordering Physician: Rajendra  Reason for Referral: Concern for aspiration  Past Medical History Relevant to Rehab: 3 mo hx of dysphagia, depression, suicidal thoughts  Developmental Status: Age Appropriate  Patient Seen During This Visit: Yes  Prior to Session Communication: Bedside " nurse  Reviewed Procedures and Risks: Yes  Date of Onset: 10/02/23  Date of Order: 10/05/23  Current Diet : Regular  Dysphagia Diagnosis: Within functional limits (Suspected esophageal deficit)         Objective       Baseline Assessment:  Behavior/Cognition: Alert, Cooperative  Hearing: Within Functional Limits  Patient Positioning: Upright in Bed  Volitional Cough: Strong      Pain:  Pain Assessment: 0-10  Pain Score: 0 - No pain       Oral/Motor Assessment:  Oral Hygiene: Moist and clear  Dentition: Adequate/Natural  Oral Motor: Within Functional Limits  Vocal Quality: Within Functional Limits  Hearing: Within Functional Limits      Consistencies Trialed:  Consistencies Trialed: Yes  Consistencies Trialed: Ice Chips, Thin (IDDSI Level 0) - Straw, Pureed/extremely thick (IDDSI Level 4), Regular (IDDSI Level 7)      Clinical Observations:  Patient Positioning: Upright in Bed  Management of Oral Secretions: Adequate  Latch Oral Secretions: Adequate  Was The 3 oz Swallow Protocol Completed: No  Signs/Symptoms of Aspiration:  (Belching suspicious of esophageal deficits)  Reported Globus Sensation: All consistencies Trialed         Encounter Problems       Encounter Problems (Active)       Nutrition       Less than 5 days NPO/clear liquids (Met)       Start:  10/03/23    Resolved:  10/04/23         Oral intake greater than 50% (Progressing)       Start:  10/03/23    Expected End:  10/05/23            Oral intake greater 75% (Progressing)       Start:  10/03/23    Expected End:  10/05/23            Consume prescribed supplement (Progressing)       Start:  10/03/23    Expected End:  10/05/23            Adequate PO fluid intake (Progressing)       Start:  10/03/23    Expected End:  10/04/23            Nutrition support goals are met within 48 hrs (Progressing)       Start:  10/03/23    Expected End:  10/04/23            Nutrition support is meeting 75% of nutrient needs (Progressing)       Start:  10/03/23    Expected  End:  10/04/23            Tube feed tolerance (Progressing)       Start:  10/03/23    Expected End:  10/04/23            BG  mg/dL (Progressing)       Start:  10/03/23    Expected End:  10/04/23            Lab values WNL (Progressing)       Start:  10/03/23    Expected End:  10/04/23            Electrolytes WNL (Progressing)       Start:  10/03/23    Expected End:  10/04/23            Promote healing (Progressing)       Start:  10/03/23    Expected End:  10/04/23            Maintain stable weight (Progressing)       Start:  10/03/23    Expected End:  10/04/23            Reduce weight from edema/fluid (Progressing)       Start:  10/03/23    Expected End:  10/04/23            Gradual weight gain (Progressing)       Start:  10/03/23    Expected End:  10/04/23            Improve ostomy output (Progressing)       Start:  10/03/23    Expected End:  10/04/23               Risk for Suicide       Suicide Risk      Accepts medications as prescribed/needed this shift (Met)       Start:  10/03/23    Resolved:  10/04/23    Accepts medications as prescribed/needed this shift         Identifies supports this shift (Met)       Start:  10/03/23    Resolved:  10/04/23    identifies supports this shift         Makes needs known through verbalization or behaviors this shift (Met)       Start:  10/03/23    Resolved:  10/04/23    makes needs known through verbalization or behaviors this shift         No self harm this shift (Progressing)       Start:  10/03/23    Expected End:  10/04/23       no self harm this shift         Read Safety Guidelines this shift (Progressing)       Start:  10/03/23    Expected End:  10/05/23       read Safety Guidelines this shift         Complete Mental Health Safety Plan (psychiatry only) this shift (Progressing)       Start:  10/03/23    Expected End:  10/05/23       complete Mental Health Safety Plan (psychiatry only) this shift                  Inpatient:  Education Documentation  Educated pt on  diet recommendations and safe swallow strategies.

## 2023-10-06 LAB
ANION GAP SERPL CALC-SCNC: 12 MMOL/L (ref 10–20)
BUN SERPL-MCNC: 13 MG/DL (ref 6–23)
CALCIUM SERPL-MCNC: 7.9 MG/DL (ref 8.6–10.3)
CHLORIDE SERPL-SCNC: 89 MMOL/L (ref 98–107)
CO2 SERPL-SCNC: 26 MMOL/L (ref 21–32)
CREAT SERPL-MCNC: 0.48 MG/DL (ref 0.5–1.05)
ERYTHROCYTE [DISTWIDTH] IN BLOOD BY AUTOMATED COUNT: 14.8 % (ref 11.5–14.5)
GFR SERPL CREATININE-BSD FRML MDRD: >90 ML/MIN/1.73M*2
GLUCOSE SERPL-MCNC: 103 MG/DL (ref 74–99)
HCT VFR BLD AUTO: 39 % (ref 36–46)
HGB BLD-MCNC: 12.9 G/DL (ref 12–16)
MCH RBC QN AUTO: 32 PG (ref 26–34)
MCHC RBC AUTO-ENTMCNC: 33.1 G/DL (ref 32–36)
MCV RBC AUTO: 97 FL (ref 80–100)
NRBC BLD-RTO: 0 /100 WBCS (ref 0–0)
PLATELET # BLD AUTO: 212 X10*3/UL (ref 150–450)
PMV BLD AUTO: 10.7 FL (ref 7.5–11.5)
POTASSIUM SERPL-SCNC: 3.6 MMOL/L (ref 3.5–5.3)
RBC # BLD AUTO: 4.03 X10*6/UL (ref 4–5.2)
SODIUM SERPL-SCNC: 123 MMOL/L (ref 136–145)
WBC # BLD AUTO: 8.1 X10*3/UL (ref 4.4–11.3)

## 2023-10-06 PROCEDURE — 2500000004 HC RX 250 GENERAL PHARMACY W/ HCPCS (ALT 636 FOR OP/ED): Performed by: NURSE PRACTITIONER

## 2023-10-06 PROCEDURE — 96372 THER/PROPH/DIAG INJ SC/IM: CPT | Performed by: FAMILY MEDICINE

## 2023-10-06 PROCEDURE — 2500000004 HC RX 250 GENERAL PHARMACY W/ HCPCS (ALT 636 FOR OP/ED): Performed by: INTERNAL MEDICINE

## 2023-10-06 PROCEDURE — 2500000005 HC RX 250 GENERAL PHARMACY W/O HCPCS: Performed by: INTERNAL MEDICINE

## 2023-10-06 PROCEDURE — 1100000001 HC PRIVATE ROOM DAILY

## 2023-10-06 PROCEDURE — 2500000001 HC RX 250 WO HCPCS SELF ADMINISTERED DRUGS (ALT 637 FOR MEDICARE OP): Performed by: FAMILY MEDICINE

## 2023-10-06 PROCEDURE — 96125 COGNITIVE TEST BY HC PRO: CPT | Mod: GO

## 2023-10-06 PROCEDURE — 85027 COMPLETE CBC AUTOMATED: CPT | Performed by: NURSE PRACTITIONER

## 2023-10-06 PROCEDURE — 36415 COLL VENOUS BLD VENIPUNCTURE: CPT | Performed by: NURSE PRACTITIONER

## 2023-10-06 PROCEDURE — 2500000004 HC RX 250 GENERAL PHARMACY W/ HCPCS (ALT 636 FOR OP/ED): Performed by: FAMILY MEDICINE

## 2023-10-06 PROCEDURE — 2500000004 HC RX 250 GENERAL PHARMACY W/ HCPCS (ALT 636 FOR OP/ED): Performed by: PSYCHIATRY & NEUROLOGY

## 2023-10-06 PROCEDURE — 2500000001 HC RX 250 WO HCPCS SELF ADMINISTERED DRUGS (ALT 637 FOR MEDICARE OP): Performed by: PHYSICIAN ASSISTANT

## 2023-10-06 PROCEDURE — 80048 BASIC METABOLIC PNL TOTAL CA: CPT | Performed by: NURSE PRACTITIONER

## 2023-10-06 RX ADMIN — QUETIAPINE FUMARATE 25 MG: 25 TABLET ORAL at 21:37

## 2023-10-06 RX ADMIN — LATANOPROST 1 DROP: 50 SOLUTION OPHTHALMIC at 21:00

## 2023-10-06 RX ADMIN — CYCLOSPORINE 1 DROP: 0.5 EMULSION OPHTHALMIC at 06:38

## 2023-10-06 RX ADMIN — CLOTRIMAZOLE AND BETAMETHASONE DIPROPIONATE 1 APPLICATION: 10; .64 CREAM TOPICAL at 21:00

## 2023-10-06 RX ADMIN — CYCLOSPORINE 1 DROP: 0.5 EMULSION OPHTHALMIC at 17:45

## 2023-10-06 RX ADMIN — SERTRALINE HYDROCHLORIDE 50 MG: 50 TABLET ORAL at 10:37

## 2023-10-06 RX ADMIN — BRIMONIDINE TARTRATE 1 DROP: 2 SOLUTION/ DROPS OPHTHALMIC at 05:45

## 2023-10-06 RX ADMIN — PROPRANOLOL HYDROCHLORIDE 20 MG: 10 TABLET ORAL at 10:35

## 2023-10-06 RX ADMIN — LEVOTHYROXINE SODIUM 25 MCG: 0.03 TABLET ORAL at 10:35

## 2023-10-06 RX ADMIN — BRIMONIDINE TARTRATE 1 DROP: 2 SOLUTION/ DROPS OPHTHALMIC at 17:45

## 2023-10-06 RX ADMIN — PANTOPRAZOLE SODIUM 20 MG: 20 TABLET, DELAYED RELEASE ORAL at 10:35

## 2023-10-06 RX ADMIN — CLOTRIMAZOLE AND BETAMETHASONE DIPROPIONATE 1 APPLICATION: 10; .64 CREAM TOPICAL at 10:36

## 2023-10-06 RX ADMIN — ENOXAPARIN SODIUM 40 MG: 40 INJECTION SUBCUTANEOUS at 10:36

## 2023-10-06 RX ADMIN — TIMOLOL MALEATE 1 DROP: 5 SOLUTION/ DROPS OPHTHALMIC at 10:36

## 2023-10-06 RX ADMIN — Medication 3 MG: at 21:38

## 2023-10-06 RX ADMIN — TAMSULOSIN HYDROCHLORIDE 0.4 MG: 0.4 CAPSULE ORAL at 21:37

## 2023-10-06 RX ADMIN — THIAMINE HYDROCHLORIDE 500 MG: 100 INJECTION, SOLUTION INTRAMUSCULAR; INTRAVENOUS at 10:38

## 2023-10-06 RX ADMIN — REMDESIVIR 100 MG: 100 INJECTION, POWDER, LYOPHILIZED, FOR SOLUTION INTRAVENOUS at 13:59

## 2023-10-06 RX ADMIN — PROPRANOLOL HYDROCHLORIDE 20 MG: 10 TABLET ORAL at 21:37

## 2023-10-06 ASSESSMENT — COGNITIVE AND FUNCTIONAL STATUS - GENERAL
MOBILITY SCORE: 20
DRESSING REGULAR UPPER BODY CLOTHING: A LITTLE
CLIMB 3 TO 5 STEPS WITH RAILING: A LOT
PERSONAL GROOMING: A LITTLE
DRESSING REGULAR LOWER BODY CLOTHING: A LITTLE
MOBILITY SCORE: 20
DAILY ACTIVITIY SCORE: 19
STANDING UP FROM CHAIR USING ARMS: A LITTLE
WALKING IN HOSPITAL ROOM: A LITTLE
CLIMB 3 TO 5 STEPS WITH RAILING: A LOT
STANDING UP FROM CHAIR USING ARMS: A LITTLE
HELP NEEDED FOR BATHING: A LITTLE
DRESSING REGULAR LOWER BODY CLOTHING: A LITTLE
TOILETING: A LITTLE
DRESSING REGULAR UPPER BODY CLOTHING: A LITTLE
DAILY ACTIVITIY SCORE: 19
PERSONAL GROOMING: A LITTLE
HELP NEEDED FOR BATHING: A LITTLE
MOBILITY SCORE: 20
CLIMB 3 TO 5 STEPS WITH RAILING: A LOT
TOILETING: A LITTLE
STANDING UP FROM CHAIR USING ARMS: A LITTLE

## 2023-10-06 ASSESSMENT — COLUMBIA-SUICIDE SEVERITY RATING SCALE - C-SSRS
2. HAVE YOU ACTUALLY HAD ANY THOUGHTS OF KILLING YOURSELF?: NO
6. HAVE YOU EVER DONE ANYTHING, STARTED TO DO ANYTHING, OR PREPARED TO DO ANYTHING TO END YOUR LIFE?: NO
1. SINCE LAST CONTACT, HAVE YOU WISHED YOU WERE DEAD OR WISHED YOU COULD GO TO SLEEP AND NOT WAKE UP?: NO

## 2023-10-06 ASSESSMENT — PAIN - FUNCTIONAL ASSESSMENT: PAIN_FUNCTIONAL_ASSESSMENT: 0-10

## 2023-10-06 ASSESSMENT — PAIN SCALES - GENERAL
PAINLEVEL_OUTOF10: 0 - NO PAIN
PAINLEVEL_OUTOF10: 0 - NO PAIN

## 2023-10-06 NOTE — PROGRESS NOTES
Qi Davila is a 82 y.o.  female presenting with H/A, suicidal ideation, depression/anxiety - found to be COVID POS. Pt lives alone and called 911, was in the process of waiting for PCP appt that afternoon but could not wait. Admitted for COVID POS.   Unvaccinated, fever, cough.   H.o. prednisone intolerance - nightmares, anxiety.   H/o severe LISA with CPAP     Subjective   Pt reports feeling somewhat better - had a bad day, but slept well overnight with reduced seroquel, melatonin  Is hungry - they are not feeding her because of her swallowing but staff reports she declined meal, just liquids.     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 Will give pt a single dose of sertraline 25 mg daily to treat. Still on 50 mg daily.   Seroquel reduced to 25 mg, passed swallow without incident today. No change    Pt does not want to go to [facility] but at the same time is concerned that she needs more help in hospital and at home  Unclear what her functioning is - has not had her PT/OT yet due to COVID.  Pt would like to go home with home care.     No change in medication today,     Objective     Physical Exam  Psychiatric:         Mood and Affect: Mood is anxious.         Speech: Speech normal.         Behavior: Behavior is slowed. Behavior is cooperative.         Thought Content: Thought content is delusional.         Cognition and Memory: Cognition is impaired.      Scheduled medications  brimonidine, 1 drop, Both Eyes, q12h  clotrimazole-betamethasone, 1 Application, Topical, BID  cycloSPORINE, 1 drop, Both Eyes, q12h  enoxaparin, 40 mg, subcutaneous, Daily  latanoprost, 1 drop, Both Eyes, Nightly  levothyroxine, 25 mcg, oral, Daily  melatonin, 3 mg, oral, Nightly  pantoprazole, 20 mg, oral, Daily before breakfast  polyethylene glycol, 17 g, oral, Daily  propranolol, 20 mg, oral, BID  QUEtiapine, 25 mg, oral, Nightly  remdesivir,  "100 mg, intravenous, q24h  sertraline, 50 mg, oral, Daily  tamsulosin, 0.4 mg, oral, Nightly  thiamine, 500 mg, intravenous, Daily  timolol, 1 drop, Both Eyes, Daily    PRN medications: fluticasone, oxygen, prochlorperazine     Results for orders placed or performed during the hospital encounter of 10/02/23 (from the past 24 hour(s))   POCT GLUCOSE   Result Value Ref Range    POCT Glucose 102 (H) 74 - 99 mg/dL      Last Recorded Vitals  Blood pressure 147/60, pulse 62, temperature 36.6 °C (97.9 °F), temperature source Temporal, resp. rate 18, height 1.626 m (5' 4\"), weight 88.5 kg (195 lb 1.7 oz), SpO2 96 %.  Intake/Output last 3 Shifts:  I/O last 3 completed shifts:  In: - (0 mL/kg)   Out: 800 (9 mL/kg) [Urine:800 (0.3 mL/kg/hr)]  Weight: 88.5 kg   This patient has a urinary catheter   Reason for the urinary catheter remaining today?     Started on tamsulosin, could have trial without de la garza - it's been 2 days, may have been having medication induced urinary retention.      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 Urinary retention related to psych meds?  Suspect the problems swallowing and urinary retention may be related to Seroquel plus sertraline  I spent 45 minutes in the professional and overall care of this patient.      Acacia Bowling MD      "

## 2023-10-06 NOTE — CARE PLAN
The patient's goals for the shift include      The clinical goals for the shift include decreased anxiety

## 2023-10-06 NOTE — PROGRESS NOTES
Occupational Therapy- MoCA      Patient Name: Qi Davila  MRN: [id number]  Today's Date: 10/6/2023  Time Calculation  Start Time: 1630  Stop Time: 1645  Time Calculation (min): 15 min    Assessment  IP OT Assessment  OT Assessment: Pt scored 20/30 on MoCA test indicated mild cognitive impairment.  Plan:  No Skilled OT: No acute OT goals identified    Subjective   Current Problem:  1. COVID-19          General:  General  Reason for Referral: MoCA  Referred By: Rajendra    Objective   Cognition:     Cognition Test Scores  Cognition Tests: Cognition Test Performed    Outcome Measures:   MoCA  Visuospatial/Executive: 2  Naming: 3  Memory: 0  Attention: Read List of Digits: 1  Attention: Read List of Letters: 1  Attention: Serial Sevens: 0  Language: Repeat: 2  Language: Fluency: 0  Abstraction: 2  Delayed Recall: 3  Orientation: 6  Add 1 Point if </=12 yr Education: 0  MOCA Total Score: 20

## 2023-10-06 NOTE — PROGRESS NOTES
Physical Therapy                 Therapy Communication Note    Patient Name: Qi Davila  MRN: [id number]  Today's Date: 10/6/2023     Discipline: Physical Therapy    Missed Visit Reason: Missed Visit Reason: Patient refused    Missed Time: Attempt    Comment: Attempted PT evaluation, pt declining, reporting stomach upset. Attempted education to pt about benefits of mobility for digestive issues as well as this being longstanding issue (pt has been dealing with since Sept.) Pt continues to decline participation at this time, requesting re-attempt later. Notified nursing.

## 2023-10-06 NOTE — PROGRESS NOTES
INPATIENT PROGRESS NOTES    PRIMARY SERVICE: Angel Drew MD         10/6/2023  7 55a    INTERVAL HPI: Pt feels so so  Feels ok  Felt bad last night but unable to provide further details    Toleating po  Slept OK  Has not yet been out of bed       PERTINENT ROS:  REVIEW OF SYSTEMS  GENERAL: negative for fever, SEE HPI  RESPIRATORY: Negative for cough, wheezing or shortness of breath.  CARDIOVASCULAR: Negative for chest pain, leg swelling or palpitations.  GI: Negative for abdominal discomfort, blood in stools or black stools or change in bowel habits  NEURO: no change per nursing  All other reviewed and negative other than HPI.      MEDICATIONS:    No current facility-administered medications on file prior to encounter.     Current Outpatient Medications on File Prior to Encounter   Medication Sig Dispense Refill    busPIRone (Buspar) 5 mg tablet Take 1 tablet (5 mg) by mouth 2 times a day.      clobetasol (Temovate) 0.05 % external solution Apply 1 Application topically 2 times a day.      clotrimazole-betamethasone (Lotrisone) cream Apply 1 Application topically 2 times a day.      famciclovir (Famvir) 500 mg tablet Take 1 tablet (500 mg) by mouth 1 time if needed. With oubreak      latanoprost (Xalatan) 0.005 % ophthalmic solution Administer 1 drop into both eyes once daily at bedtime.      levothyroxine (Synthroid, Levoxyl) 25 mcg tablet Take 1 tablet (25 mcg) by mouth once daily.      propranolol (Inderal) 20 mg tablet Take 1 tablet (20 mg) by mouth 2 times a day.      Restasis 0.05 % ophthalmic emulsion Administer 1 drop into both eyes every 12 hours.      sertraline (Zoloft) 50 mg tablet Take 2 tablets (100 mg) by mouth once daily.      Alphagan P 0.1 % ophthalmic solution Administer 1 drop into both eyes every 12 hours.      ciclopirox 1 % shampoo Apply 1 Application topically once daily at bedtime.      dexlansoprazole (Dexilant) 30 mg DR capsule Take 1 capsule (30 mg) by mouth once daily.      fish oil  concentrate (Omega-3) 120-180 mg capsule Take 1 capsule (1 g) by mouth once daily.      fluticasone (Flonase) 50 mcg/actuation nasal spray Administer 1 spray into each nostril once daily.      QUEtiapine (SEROquel) 50 mg tablet Take 1 tablet (50 mg) by mouth once daily at bedtime.      timolol (Timoptic) 0.5 % ophthalmic solution Administer 1 drop into both eyes once daily.           PHYSICAL EXAM:   Vitals:    10/05/23 1431 10/05/23 2100 10/06/23 0154 10/06/23 0205   BP: 126/71 147/60 (!) 128/38 120/74   BP Location: Right arm Right arm     Patient Position: Lying Lying     Pulse: 62 62 61    Resp: 18 18 17    Temp: 36.7 °C (98.1 °F) 36.6 °C (97.9 °F) 36.8 °C (98.2 °F)    TempSrc: Oral Temporal     SpO2: 93% 96% 95%    Weight:       Height:            PHYSICAL EXAMINATION:    General appearance: frail edlerly   Skin: skin color, texture, turgor normal,   HEENT: Anicteric sclera.  Oropharynx mucosa moist  Neck: Supple, no adenopathy;   Back: no pain to palpation over spine or costovertebral angles,   Lungs: clear to auscultation, no wheezing or rhonchi  Heart: RRR without murmur, gallop, or rubs.   Abdomen: Abdomen soft, non-tender. Bowel sounds normal. No masses, organomegaly  Extremities: Extremities normal. No  edema, or skin discoloration.   Musculoskeletal: Spine range of motion normal. Muscular strength intact  Neuro: Oriented X  3    DATA: CBC, Coags, BMP, Mg, Phos   Results for orders placed or performed during the hospital encounter of 10/02/23 (from the past 96 hour(s))   CBC and Auto Differential   Result Value Ref Range    WBC 6.4 4.4 - 11.3 x10*3/uL    nRBC 0.0 0.0 - 0.0 /100 WBCs    RBC 3.36 (L) 4.00 - 5.20 x10*6/uL    Hemoglobin 10.8 (L) 12.0 - 16.0 g/dL    Hematocrit 31.9 (L) 36.0 - 46.0 %    MCV 95 80 - 100 fL    MCH 32.1 26.0 - 34.0 pg    MCHC 33.9 32.0 - 36.0 g/dL    RDW 15.2 (H) 11.5 - 14.5 %    Platelets 221 150 - 450 x10*3/uL    MPV 11.8 (H) 7.5 - 11.5 fL    Neutrophils % 58.1 40.0 - 80.0 %     Immature Granulocytes %, Automated 0.5 0.0 - 0.9 %    Lymphocytes % 19.6 13.0 - 44.0 %    Monocytes % 19.9 2.0 - 10.0 %    Eosinophils % 1.1 0.0 - 6.0 %    Basophils % 0.8 0.0 - 2.0 %    Neutrophils Absolute 3.70 1.60 - 5.50 x10*3/uL    Immature Granulocytes Absolute, Automated 0.03 0.00 - 0.50 x10*3/uL    Lymphocytes Absolute 1.25 0.80 - 3.00 x10*3/uL    Monocytes Absolute 1.27 (H) 0.05 - 0.80 x10*3/uL    Eosinophils Absolute 0.07 0.00 - 0.40 x10*3/uL    Basophils Absolute 0.05 0.00 - 0.10 x10*3/uL   Comprehensive Metabolic Panel   Result Value Ref Range    Glucose 98 74 - 99 mg/dL    Sodium 124 (L) 136 - 145 mmol/L    Potassium 3.7 3.5 - 5.3 mmol/L    Chloride 94 (L) 98 - 107 mmol/L    Bicarbonate 24 21 - 32 mmol/L    Anion Gap 10 10 - 20 mmol/L    Urea Nitrogen 5 (L) 6 - 23 mg/dL    Creatinine 0.48 (L) 0.50 - 1.05 mg/dL    eGFR >90 >60 mL/min/1.73m*2    Calcium 8.4 (L) 8.6 - 10.3 mg/dL    Albumin 3.5 3.4 - 5.0 g/dL    Alkaline Phosphatase 51 33 - 136 U/L    Total Protein 8.5 (H) 6.4 - 8.2 g/dL    AST 20 9 - 39 U/L    Bilirubin, Total 0.4 0.0 - 1.2 mg/dL    ALT 17 7 - 45 U/L   Acute Toxicology Panel, Blood   Result Value Ref Range    Acetaminophen <10.0 10.0 - 30.0 ug/mL    Salicylate  <3 4 - 20 mg/dL    Alcohol <10 <=10 mg/dL   Sars-CoV-2 PCR, Symptomatic   Result Value Ref Range    Coronavirus 2019, PCR Detected (AA) Not Detected   POCT GLUCOSE   Result Value Ref Range    POCT Glucose 98 74 - 99 mg/dL   POCT GLUCOSE   Result Value Ref Range    POCT Glucose 102 (H) 74 - 99 mg/dL           ASSESSMENT AND PLAN:     COVID   Anxiety   Elevated BP  Could be related to stress    -Input from ID noted, Cont with remdesivir. No plan for steroid due to rxn in past  [anxiety]  -Consulted psychiatry , input noted. Cont w med adjustments per psyche. Added IV thiamine per reccs. Added tamsulosin   -Speech eval, input noted  -PTOT, MOCA  -Continue current treatment as ordered. Will make adjustments as necessary.        MRI of  pancreas as out patient     C/w  hydralazine prn for blood pressure    VTE Prophylaxis  -See Orders    -Continue current treatment as ordered. Will make adjustments as necessary.    Plan of care discussed with: Provider, RN, Patient.      Patient case and plan of care discussed with Dr. JESSICA Drew.    Jose Carlisle, TREASURE - CNP  -In collaboration with Dr. JESSICA Drew    [facility]  Office: [phone]  Fax: [phone]   I have reviewed the above note obtained and documented by the NP/PA and I personally participated in the key components. I have discussed the case and management of the patient's care. Changes made to the note, and all key components of history and physical/progress note done by me.  dw nursing  Does appear better today   She is alert karina  Will cont with current   Ot pt   ID alcides Szymanski plan TBD  Angel Drew MD

## 2023-10-06 NOTE — CARE PLAN
The patient's goals for the shift include      The clinical goals for the shift include decrease anxiety    Over the shift, the patient did not make progress toward the following goals. Barriers to progression include . Recommendations to address these barriers include   Problem: Risk for Suicide  Goal: Read Safety Guidelines this shift  Outcome: Progressing   .    Problem: Nutrition  Goal: Oral intake greater than 50%  Outcome: Not Progressing  Goal: Oral intake greater 75%  Outcome: Not Progressing

## 2023-10-06 NOTE — PROGRESS NOTES
COVID + Patient not medically ready for discharge. Chart reviewed per TCC. PT, OT, SLP evaluations pending.  OT MOCA eval pending, plan for Psychiatric re- evaluation.  Per CNP note, patient has not been OOB in room. TCC following for discharge needs.  Tamera DAVALOSN RN TCC CCM

## 2023-10-07 LAB
AMORPH CRY #/AREA UR COMP ASSIST: ABNORMAL /HPF
AMPHETAMINES UR QL SCN: NORMAL
ANION GAP SERPL CALC-SCNC: 8 MMOL/L (ref 10–20)
ANION GAP SERPL CALC-SCNC: 9 MMOL/L (ref 10–20)
APPEARANCE UR: ABNORMAL
BACTERIA #/AREA URNS AUTO: ABNORMAL /HPF
BARBITURATES UR QL SCN: NORMAL
BENZODIAZ UR QL SCN: NORMAL
BILIRUB UR STRIP.AUTO-MCNC: NEGATIVE MG/DL
BUN SERPL-MCNC: 13 MG/DL (ref 6–23)
BUN SERPL-MCNC: 13 MG/DL (ref 6–23)
BZE UR QL SCN: NORMAL
CALCIUM SERPL-MCNC: 7.3 MG/DL (ref 8.6–10.3)
CALCIUM SERPL-MCNC: 7.5 MG/DL (ref 8.6–10.3)
CANNABINOIDS UR QL SCN: NORMAL
CHLORIDE SERPL-SCNC: 90 MMOL/L (ref 98–107)
CHLORIDE SERPL-SCNC: 91 MMOL/L (ref 98–107)
CO2 SERPL-SCNC: 25 MMOL/L (ref 21–32)
CO2 SERPL-SCNC: 26 MMOL/L (ref 21–32)
COLOR UR: YELLOW
CREAT SERPL-MCNC: 0.41 MG/DL (ref 0.5–1.05)
CREAT SERPL-MCNC: 0.41 MG/DL (ref 0.5–1.05)
CREAT UR-MCNC: 67.9 MG/DL (ref 20–320)
ERYTHROCYTE [DISTWIDTH] IN BLOOD BY AUTOMATED COUNT: 14.1 % (ref 11.5–14.5)
FENTANYL+NORFENTANYL UR QL SCN: NORMAL
GFR SERPL CREATININE-BSD FRML MDRD: >90 ML/MIN/1.73M*2
GFR SERPL CREATININE-BSD FRML MDRD: >90 ML/MIN/1.73M*2
GLUCOSE SERPL-MCNC: 117 MG/DL (ref 74–99)
GLUCOSE SERPL-MCNC: 97 MG/DL (ref 74–99)
GLUCOSE UR STRIP.AUTO-MCNC: NEGATIVE MG/DL
HCT VFR BLD AUTO: 34.2 % (ref 36–46)
HGB BLD-MCNC: 12.1 G/DL (ref 12–16)
KETONES UR STRIP.AUTO-MCNC: NEGATIVE MG/DL
LEUKOCYTE ESTERASE UR QL STRIP.AUTO: ABNORMAL
MCH RBC QN AUTO: 32.1 PG (ref 26–34)
MCHC RBC AUTO-ENTMCNC: 35.4 G/DL (ref 32–36)
MCV RBC AUTO: 91 FL (ref 80–100)
MUCOUS THREADS #/AREA URNS AUTO: ABNORMAL /LPF
NITRITE UR QL STRIP.AUTO: NEGATIVE
NRBC BLD-RTO: 0 /100 WBCS (ref 0–0)
OPIATES UR QL SCN: NORMAL
OSMOLALITY SERPL: 259 MOSM/KG (ref 280–300)
OSMOLALITY UR: 450 MOSM/KG (ref 200–1200)
OXYCODONE+OXYMORPHONE UR QL SCN: NORMAL
PCP UR QL SCN: NORMAL
PH UR STRIP.AUTO: 7 [PH]
PLATELET # BLD AUTO: 229 X10*3/UL (ref 150–450)
PMV BLD AUTO: 10.6 FL (ref 7.5–11.5)
POTASSIUM SERPL-SCNC: 3.1 MMOL/L (ref 3.5–5.3)
POTASSIUM SERPL-SCNC: 3.3 MMOL/L (ref 3.5–5.3)
PROT UR STRIP.AUTO-MCNC: ABNORMAL MG/DL
RBC # BLD AUTO: 3.77 X10*6/UL (ref 4–5.2)
RBC # UR STRIP.AUTO: ABNORMAL /UL
RBC #/AREA URNS AUTO: ABNORMAL /HPF
SODIUM SERPL-SCNC: 121 MMOL/L (ref 136–145)
SODIUM SERPL-SCNC: 122 MMOL/L (ref 136–145)
SODIUM UR-SCNC: 18 MMOL/L
SODIUM/CREAT UR-RTO: 27 MMOL/G CREAT
SP GR UR STRIP.AUTO: 1.01
TRI-PHOS CRY #/AREA UR COMP ASSIST: ABNORMAL /HPF
TSH SERPL-ACNC: 1.74 MIU/L (ref 0.44–3.98)
URATE SERPL-MCNC: 2.2 MG/DL (ref 2.3–6.7)
UROBILINOGEN UR STRIP.AUTO-MCNC: 4 MG/DL
WBC # BLD AUTO: 7.1 X10*3/UL (ref 4.4–11.3)
WBC #/AREA URNS AUTO: >50 /HPF

## 2023-10-07 PROCEDURE — 2500000004 HC RX 250 GENERAL PHARMACY W/ HCPCS (ALT 636 FOR OP/ED): Performed by: PHYSICIAN ASSISTANT

## 2023-10-07 PROCEDURE — 84550 ASSAY OF BLOOD/URIC ACID: CPT | Performed by: INTERNAL MEDICINE

## 2023-10-07 PROCEDURE — 2500000001 HC RX 250 WO HCPCS SELF ADMINISTERED DRUGS (ALT 637 FOR MEDICARE OP): Performed by: PHYSICIAN ASSISTANT

## 2023-10-07 PROCEDURE — 2500000005 HC RX 250 GENERAL PHARMACY W/O HCPCS: Performed by: INTERNAL MEDICINE

## 2023-10-07 PROCEDURE — 36415 COLL VENOUS BLD VENIPUNCTURE: CPT | Performed by: INTERNAL MEDICINE

## 2023-10-07 PROCEDURE — 84443 ASSAY THYROID STIM HORMONE: CPT | Performed by: INTERNAL MEDICINE

## 2023-10-07 PROCEDURE — 2500000001 HC RX 250 WO HCPCS SELF ADMINISTERED DRUGS (ALT 637 FOR MEDICARE OP): Performed by: FAMILY MEDICINE

## 2023-10-07 PROCEDURE — 80048 BASIC METABOLIC PNL TOTAL CA: CPT | Performed by: INTERNAL MEDICINE

## 2023-10-07 PROCEDURE — 2500000004 HC RX 250 GENERAL PHARMACY W/ HCPCS (ALT 636 FOR OP/ED): Performed by: NURSE PRACTITIONER

## 2023-10-07 PROCEDURE — 96372 THER/PROPH/DIAG INJ SC/IM: CPT | Performed by: FAMILY MEDICINE

## 2023-10-07 PROCEDURE — 81001 URINALYSIS AUTO W/SCOPE: CPT | Performed by: INTERNAL MEDICINE

## 2023-10-07 PROCEDURE — 82570 ASSAY OF URINE CREATININE: CPT | Performed by: INTERNAL MEDICINE

## 2023-10-07 PROCEDURE — 80048 BASIC METABOLIC PNL TOTAL CA: CPT | Performed by: NURSE PRACTITIONER

## 2023-10-07 PROCEDURE — 36415 COLL VENOUS BLD VENIPUNCTURE: CPT | Performed by: NURSE PRACTITIONER

## 2023-10-07 PROCEDURE — 83935 ASSAY OF URINE OSMOLALITY: CPT | Mod: AHULAB,CMCLAB | Performed by: INTERNAL MEDICINE

## 2023-10-07 PROCEDURE — 2500000004 HC RX 250 GENERAL PHARMACY W/ HCPCS (ALT 636 FOR OP/ED): Performed by: PSYCHIATRY & NEUROLOGY

## 2023-10-07 PROCEDURE — 2500000004 HC RX 250 GENERAL PHARMACY W/ HCPCS (ALT 636 FOR OP/ED): Performed by: INTERNAL MEDICINE

## 2023-10-07 PROCEDURE — 99232 SBSQ HOSP IP/OBS MODERATE 35: CPT | Performed by: INTERNAL MEDICINE

## 2023-10-07 PROCEDURE — 1100000001 HC PRIVATE ROOM DAILY

## 2023-10-07 PROCEDURE — 83930 ASSAY OF BLOOD OSMOLALITY: CPT | Mod: AHULAB,CMCLAB | Performed by: INTERNAL MEDICINE

## 2023-10-07 PROCEDURE — 80307 DRUG TEST PRSMV CHEM ANLYZR: CPT | Performed by: GENERAL PRACTICE

## 2023-10-07 PROCEDURE — 85027 COMPLETE CBC AUTOMATED: CPT | Performed by: NURSE PRACTITIONER

## 2023-10-07 PROCEDURE — 2500000004 HC RX 250 GENERAL PHARMACY W/ HCPCS (ALT 636 FOR OP/ED): Performed by: FAMILY MEDICINE

## 2023-10-07 RX ADMIN — THIAMINE HYDROCHLORIDE 500 MG: 100 INJECTION, SOLUTION INTRAMUSCULAR; INTRAVENOUS at 08:25

## 2023-10-07 RX ADMIN — TIMOLOL MALEATE 1 DROP: 5 SOLUTION/ DROPS OPHTHALMIC at 08:16

## 2023-10-07 RX ADMIN — PROCHLORPERAZINE EDISYLATE 10 MG: 5 INJECTION INTRAMUSCULAR; INTRAVENOUS at 08:37

## 2023-10-07 RX ADMIN — ENOXAPARIN SODIUM 40 MG: 40 INJECTION SUBCUTANEOUS at 08:13

## 2023-10-07 RX ADMIN — LATANOPROST 1 DROP: 50 SOLUTION OPHTHALMIC at 21:00

## 2023-10-07 RX ADMIN — CYCLOSPORINE 1 DROP: 0.5 EMULSION OPHTHALMIC at 05:44

## 2023-10-07 RX ADMIN — PANTOPRAZOLE SODIUM 20 MG: 20 TABLET, DELAYED RELEASE ORAL at 08:14

## 2023-10-07 RX ADMIN — PROPRANOLOL HYDROCHLORIDE 20 MG: 10 TABLET ORAL at 21:48

## 2023-10-07 RX ADMIN — CYCLOSPORINE 1 DROP: 0.5 EMULSION OPHTHALMIC at 17:45

## 2023-10-07 RX ADMIN — PROPRANOLOL HYDROCHLORIDE 20 MG: 10 TABLET ORAL at 08:13

## 2023-10-07 RX ADMIN — BRIMONIDINE TARTRATE 1 DROP: 2 SOLUTION/ DROPS OPHTHALMIC at 05:45

## 2023-10-07 RX ADMIN — Medication 3 MG: at 21:49

## 2023-10-07 RX ADMIN — CLOTRIMAZOLE AND BETAMETHASONE DIPROPIONATE 1 APPLICATION: 10; .64 CREAM TOPICAL at 08:15

## 2023-10-07 RX ADMIN — LEVOTHYROXINE SODIUM 25 MCG: 0.03 TABLET ORAL at 08:14

## 2023-10-07 RX ADMIN — POLYETHYLENE GLYCOL (3350) 17 G: 17 POWDER, FOR SOLUTION ORAL at 08:13

## 2023-10-07 RX ADMIN — REMDESIVIR 100 MG: 100 INJECTION, POWDER, LYOPHILIZED, FOR SOLUTION INTRAVENOUS at 13:11

## 2023-10-07 RX ADMIN — BRIMONIDINE TARTRATE 1 DROP: 2 SOLUTION/ DROPS OPHTHALMIC at 17:22

## 2023-10-07 RX ADMIN — SERTRALINE HYDROCHLORIDE 50 MG: 50 TABLET ORAL at 08:14

## 2023-10-07 RX ADMIN — BRIMONIDINE TARTRATE 1 DROP: 2 SOLUTION/ DROPS OPHTHALMIC at 08:16

## 2023-10-07 RX ADMIN — QUETIAPINE FUMARATE 25 MG: 25 TABLET ORAL at 21:49

## 2023-10-07 RX ADMIN — CLOTRIMAZOLE AND BETAMETHASONE DIPROPIONATE 1 APPLICATION: 10; .64 CREAM TOPICAL at 21:00

## 2023-10-07 RX ADMIN — TAMSULOSIN HYDROCHLORIDE 0.4 MG: 0.4 CAPSULE ORAL at 21:49

## 2023-10-07 ASSESSMENT — COLUMBIA-SUICIDE SEVERITY RATING SCALE - C-SSRS
6. HAVE YOU EVER DONE ANYTHING, STARTED TO DO ANYTHING, OR PREPARED TO DO ANYTHING TO END YOUR LIFE?: NO
2. HAVE YOU ACTUALLY HAD ANY THOUGHTS OF KILLING YOURSELF?: NO
1. SINCE LAST CONTACT, HAVE YOU WISHED YOU WERE DEAD OR WISHED YOU COULD GO TO SLEEP AND NOT WAKE UP?: NO

## 2023-10-07 ASSESSMENT — PAIN SCALES - GENERAL
PAINLEVEL_OUTOF10: 0 - NO PAIN

## 2023-10-07 ASSESSMENT — PAIN - FUNCTIONAL ASSESSMENT
PAIN_FUNCTIONAL_ASSESSMENT: 0-10

## 2023-10-07 ASSESSMENT — COGNITIVE AND FUNCTIONAL STATUS - GENERAL
STANDING UP FROM CHAIR USING ARMS: A LITTLE
STANDING UP FROM CHAIR USING ARMS: A LITTLE
DRESSING REGULAR UPPER BODY CLOTHING: A LITTLE
DAILY ACTIVITIY SCORE: 19
WALKING IN HOSPITAL ROOM: A LITTLE
EATING MEALS: A LITTLE
TOILETING: A LITTLE
PERSONAL GROOMING: A LITTLE
MOBILITY SCORE: 20
HELP NEEDED FOR BATHING: A LITTLE
HELP NEEDED FOR BATHING: A LITTLE
WALKING IN HOSPITAL ROOM: A LITTLE
DRESSING REGULAR LOWER BODY CLOTHING: A LITTLE
CLIMB 3 TO 5 STEPS WITH RAILING: A LITTLE
MOBILITY SCORE: 21
PERSONAL GROOMING: A LITTLE
TOILETING: A LITTLE
DRESSING REGULAR LOWER BODY CLOTHING: A LITTLE
CLIMB 3 TO 5 STEPS WITH RAILING: A LOT
DAILY ACTIVITIY SCORE: 19

## 2023-10-07 NOTE — CARE PLAN
Problem: Risk for Suicide  Goal: Read Safety Guidelines this shift  Outcome: Progressing  Goal: Complete Mental Health Safety Plan (psychiatry only) this shift  Outcome: Progressing     Problem: Nutrition  Goal: Oral intake greater than 50%  Outcome: Progressing  Goal: Oral intake greater 75%  Outcome: Progressing  Goal: Consume prescribed supplement  Outcome: Progressing  Goal: Adequate PO fluid intake  Outcome: Progressing  Goal: Nutrition support goals are met within 48 hrs  Outcome: Progressing  Goal: Nutrition support is meeting 75% of nutrient needs  Outcome: Progressing  Goal: Tube feed tolerance  Outcome: Progressing  Goal: BG  mg/dL  Outcome: Progressing  Goal: Lab values WNL  Outcome: Progressing  Goal: Electrolytes WNL  Outcome: Progressing  Goal: Promote healing  Outcome: Progressing  Goal: Maintain stable weight  Outcome: Progressing  Goal: Reduce weight from edema/fluid  Outcome: Progressing  Goal: Gradual weight gain  Outcome: Progressing  Goal: Improve ostomy output  Outcome: Progressing   The patient's goals for the shift include      The clinical goals for the shift include increase mobility    Over the shift, the patient did not make progress toward the following goals.

## 2023-10-07 NOTE — CARE PLAN
The patient's goals for the shift include      The clinical goals for the shift include increased mobilty    Over the shift, the patient did not make progress toward the following goals.

## 2023-10-08 LAB
ERYTHROCYTE [DISTWIDTH] IN BLOOD BY AUTOMATED COUNT: 13.9 % (ref 11.5–14.5)
GLUCOSE BLD MANUAL STRIP-MCNC: 130 MG/DL (ref 74–99)
HCT VFR BLD AUTO: 34.9 % (ref 36–46)
HGB BLD-MCNC: 12.5 G/DL (ref 12–16)
MCH RBC QN AUTO: 32.3 PG (ref 26–34)
MCHC RBC AUTO-ENTMCNC: 35.8 G/DL (ref 32–36)
MCV RBC AUTO: 90 FL (ref 80–100)
NRBC BLD-RTO: 0 /100 WBCS (ref 0–0)
PLATELET # BLD AUTO: 238 X10*3/UL (ref 150–450)
PMV BLD AUTO: 10.8 FL (ref 7.5–11.5)
RBC # BLD AUTO: 3.87 X10*6/UL (ref 4–5.2)
WBC # BLD AUTO: 7 X10*3/UL (ref 4.4–11.3)

## 2023-10-08 PROCEDURE — 2500000001 HC RX 250 WO HCPCS SELF ADMINISTERED DRUGS (ALT 637 FOR MEDICARE OP): Performed by: PHYSICIAN ASSISTANT

## 2023-10-08 PROCEDURE — 2500000004 HC RX 250 GENERAL PHARMACY W/ HCPCS (ALT 636 FOR OP/ED): Performed by: PHYSICIAN ASSISTANT

## 2023-10-08 PROCEDURE — 85027 COMPLETE CBC AUTOMATED: CPT | Performed by: INTERNAL MEDICINE

## 2023-10-08 PROCEDURE — 2500000004 HC RX 250 GENERAL PHARMACY W/ HCPCS (ALT 636 FOR OP/ED): Performed by: PSYCHIATRY & NEUROLOGY

## 2023-10-08 PROCEDURE — 36415 COLL VENOUS BLD VENIPUNCTURE: CPT | Performed by: INTERNAL MEDICINE

## 2023-10-08 PROCEDURE — 2500000001 HC RX 250 WO HCPCS SELF ADMINISTERED DRUGS (ALT 637 FOR MEDICARE OP): Performed by: FAMILY MEDICINE

## 2023-10-08 PROCEDURE — 2500000004 HC RX 250 GENERAL PHARMACY W/ HCPCS (ALT 636 FOR OP/ED): Performed by: INTERNAL MEDICINE

## 2023-10-08 PROCEDURE — 96372 THER/PROPH/DIAG INJ SC/IM: CPT | Performed by: FAMILY MEDICINE

## 2023-10-08 PROCEDURE — 2500000004 HC RX 250 GENERAL PHARMACY W/ HCPCS (ALT 636 FOR OP/ED): Performed by: NURSE PRACTITIONER

## 2023-10-08 PROCEDURE — 1100000001 HC PRIVATE ROOM DAILY

## 2023-10-08 PROCEDURE — 2500000004 HC RX 250 GENERAL PHARMACY W/ HCPCS (ALT 636 FOR OP/ED): Performed by: FAMILY MEDICINE

## 2023-10-08 PROCEDURE — 82947 ASSAY GLUCOSE BLOOD QUANT: CPT

## 2023-10-08 PROCEDURE — 99232 SBSQ HOSP IP/OBS MODERATE 35: CPT | Performed by: INTERNAL MEDICINE

## 2023-10-08 RX ORDER — CEFTRIAXONE 1 G/50ML
1 INJECTION, SOLUTION INTRAVENOUS EVERY 24 HOURS
Status: DISCONTINUED | OUTPATIENT
Start: 2023-10-08 | End: 2023-10-14

## 2023-10-08 RX ADMIN — PROPRANOLOL HYDROCHLORIDE 20 MG: 10 TABLET ORAL at 21:03

## 2023-10-08 RX ADMIN — TAMSULOSIN HYDROCHLORIDE 0.4 MG: 0.4 CAPSULE ORAL at 21:04

## 2023-10-08 RX ADMIN — BRIMONIDINE TARTRATE 1 DROP: 2 SOLUTION/ DROPS OPHTHALMIC at 05:45

## 2023-10-08 RX ADMIN — Medication 3 MG: at 21:04

## 2023-10-08 RX ADMIN — PANTOPRAZOLE SODIUM 20 MG: 20 TABLET, DELAYED RELEASE ORAL at 06:13

## 2023-10-08 RX ADMIN — LATANOPROST 1 DROP: 50 SOLUTION OPHTHALMIC at 21:52

## 2023-10-08 RX ADMIN — ENOXAPARIN SODIUM 40 MG: 40 INJECTION SUBCUTANEOUS at 08:14

## 2023-10-08 RX ADMIN — ENOXAPARIN SODIUM 40 MG: 40 INJECTION SUBCUTANEOUS at 08:13

## 2023-10-08 RX ADMIN — PROCHLORPERAZINE EDISYLATE 10 MG: 5 INJECTION INTRAMUSCULAR; INTRAVENOUS at 08:15

## 2023-10-08 RX ADMIN — SERTRALINE HYDROCHLORIDE 50 MG: 50 TABLET ORAL at 08:14

## 2023-10-08 RX ADMIN — CLOTRIMAZOLE AND BETAMETHASONE DIPROPIONATE 1 APPLICATION: 10; .64 CREAM TOPICAL at 08:28

## 2023-10-08 RX ADMIN — LEVOTHYROXINE SODIUM 25 MCG: 0.03 TABLET ORAL at 08:15

## 2023-10-08 RX ADMIN — CLOTRIMAZOLE AND BETAMETHASONE DIPROPIONATE 1 APPLICATION: 10; .64 CREAM TOPICAL at 21:04

## 2023-10-08 RX ADMIN — TIMOLOL MALEATE 1 DROP: 5 SOLUTION/ DROPS OPHTHALMIC at 08:28

## 2023-10-08 RX ADMIN — CYCLOSPORINE 1 DROP: 0.5 EMULSION OPHTHALMIC at 05:45

## 2023-10-08 RX ADMIN — PROPRANOLOL HYDROCHLORIDE 20 MG: 10 TABLET ORAL at 08:15

## 2023-10-08 RX ADMIN — POLYETHYLENE GLYCOL (3350) 17 G: 17 POWDER, FOR SOLUTION ORAL at 08:15

## 2023-10-08 RX ADMIN — CYCLOSPORINE 1 DROP: 0.5 EMULSION OPHTHALMIC at 17:45

## 2023-10-08 RX ADMIN — QUETIAPINE FUMARATE 25 MG: 25 TABLET ORAL at 21:04

## 2023-10-08 RX ADMIN — CEFTRIAXONE SODIUM 1 G: 1 INJECTION, SOLUTION INTRAVENOUS at 14:52

## 2023-10-08 RX ADMIN — BRIMONIDINE TARTRATE 1 DROP: 2 SOLUTION/ DROPS OPHTHALMIC at 17:45

## 2023-10-08 ASSESSMENT — COGNITIVE AND FUNCTIONAL STATUS - GENERAL
PERSONAL GROOMING: A LITTLE
HELP NEEDED FOR BATHING: A LITTLE
DAILY ACTIVITIY SCORE: 18
TOILETING: A LITTLE
WALKING IN HOSPITAL ROOM: A LITTLE
DRESSING REGULAR UPPER BODY CLOTHING: A LITTLE
CLIMB 3 TO 5 STEPS WITH RAILING: A LITTLE
STANDING UP FROM CHAIR USING ARMS: A LITTLE
EATING MEALS: A LITTLE
MOBILITY SCORE: 21
DRESSING REGULAR LOWER BODY CLOTHING: A LITTLE

## 2023-10-08 ASSESSMENT — PAIN - FUNCTIONAL ASSESSMENT
PAIN_FUNCTIONAL_ASSESSMENT: 0-10
PAIN_FUNCTIONAL_ASSESSMENT: 0-10

## 2023-10-08 ASSESSMENT — COLUMBIA-SUICIDE SEVERITY RATING SCALE - C-SSRS
1. SINCE LAST CONTACT, HAVE YOU WISHED YOU WERE DEAD OR WISHED YOU COULD GO TO SLEEP AND NOT WAKE UP?: NO
2. HAVE YOU ACTUALLY HAD ANY THOUGHTS OF KILLING YOURSELF?: NO
6. HAVE YOU EVER DONE ANYTHING, STARTED TO DO ANYTHING, OR PREPARED TO DO ANYTHING TO END YOUR LIFE?: NO

## 2023-10-08 ASSESSMENT — PAIN SCALES - GENERAL
PAINLEVEL_OUTOF10: 0 - NO PAIN
PAINLEVEL_OUTOF10: 0 - NO PAIN

## 2023-10-08 NOTE — PROGRESS NOTES
Qi Davila is a 82 y.o. female     Positive urinalysis concerning for work catheter associated UTI  The Samson catheter would need to be changed at some point as last time it was changed was 4 weeks ago    Review of Systems     Constitutional: no fever, no chills,    Cardiovascular: the heart rate was not slow, the heart rate was not fast, no chest pain, no palpitations, no intermittent leg claudication and no lower extremity edema.   Respiratory: no cough, wheezing or shortness of breath at rest or exertion  Gastrointestinal: no abdominal pain, no constipation, no melena, no nausea, no diarrhea, no vomiting and no blood in stools.   Musculoskeletal: no arthralgias, no myalgias, no back pain, no joint swelling, no joint stiffness, no limb pain and no limb swelling.   Integumentary: no skin rashes, no skin lesions, no itching, no skin wound and no dry skin.   Neurological: no headache, no confusion, no numbness, no dizziness, no tingling and no fainting.   All other systems have been reviewed and are negative for complaint.       Vitals:    10/08/23 0827   BP: 102/88   Pulse: 61   Resp: 17   Temp: 36.7 °C (98.1 °F)   SpO2: 96%        Scheduled medications  brimonidine, 1 drop, Both Eyes, q12h  cefTRIAXone, 1 g, intravenous, q24h  clotrimazole-betamethasone, 1 Application, Topical, BID  cycloSPORINE, 1 drop, Both Eyes, q12h  enoxaparin, 40 mg, subcutaneous, Daily  latanoprost, 1 drop, Both Eyes, Nightly  levothyroxine, 25 mcg, oral, Daily  melatonin, 3 mg, oral, Nightly  pantoprazole, 20 mg, oral, Daily before breakfast  polyethylene glycol, 17 g, oral, Daily  propranolol, 20 mg, oral, BID  QUEtiapine, 25 mg, oral, Nightly  sertraline, 50 mg, oral, Daily  tamsulosin, 0.4 mg, oral, Nightly  timolol, 1 drop, Both Eyes, Daily      Continuous medications     PRN medications  PRN medications: fluticasone, oxygen, prochlorperazine    Lab Review   Results from last 7 days   Lab Units 10/08/23  1016 10/07/23  0649  10/06/23  0806   WBC AUTO x10*3/uL 7.0 7.1 8.1   HEMOGLOBIN g/dL 12.5 12.1 12.9   HEMATOCRIT % 34.9* 34.2* 39.0   PLATELETS AUTO x10*3/uL 238 229 212       Results from last 7 days   Lab Units 10/07/23  1600 10/07/23  0649 10/06/23  0806 10/02/23  1425   SODIUM mmol/L 121* 122* 123* 124*   POTASSIUM mmol/L 3.3* 3.1* 3.6 3.7   CHLORIDE mmol/L 90* 91* 89* 94*   CO2 mmol/L 26 25 26 24   BUN mg/dL 13 13 13 5*   CREATININE mg/dL 0.41* 0.41* 0.48* 0.48*   CALCIUM mg/dL 7.3* 7.5* 7.9* 8.4*   PROTEIN TOTAL g/dL  --   --   --  8.5*   BILIRUBIN TOTAL mg/dL  --   --   --  0.4   ALK PHOS U/L  --   --   --  51   ALT U/L  --   --   --  17   AST U/L  --   --   --  20   GLUCOSE mg/dL 117* 97 103* 98              XR chest 2 views    Result Date: 10/2/2023  Interpreted By:  Stacey Howell, STUDY: XR CHEST 2 VIEWS;  10/2/2023 12:09 pm   INDICATION: Signs/Symptoms:productive cough.   COMPARISON: None.   ACCESSION NUMBER(S): [id number]   ORDERING CLINICIAN: JAZMIN SANDERSON   FINDINGS: No consolidation. No pleural effusion or pneumothorax. Atherosclerosis. Cardiomegaly. Diffuse interstitial pulmonary opacities suggestive of pulmonary edema. No acute osseous abnormality. Degenerative changes in the spine.       Cardiomegaly with diffuse interstitial pulmonary opacities suggestive of pulmonary edema. Superimposed infection not excluded.   Signed by: Stacey Howell 10/2/2023 12:17 PM Dictation workstation:   [id number]    CT abdomen pelvis w IV contrast    Result Date: 9/28/2023  Interpreted By:  IGGY FULTON MD MRN: [id number] Patient Name: SHAHANA CARDONA  STUDY: CT ABDOMEN AND PELVIS W IV CONTRAST;  9/28/2023 6:56 am  INDICATION: gen abd pain .  COMPARISON: None.  ACCESSION NUMBER(S): [id number]  ORDERING CLINICIAN: JOSEPHINE ORTIZ  TECHNIQUE: Contiguous axial images were obtained through the abdomen and pelvis after the administration of  75 mL Omnipaque 350  FINDINGS: LOWER CHEST: No confluent airspace disease. Mosaic aeration about  the lung bases with mild interstitial prominence.  ABDOMEN:  LIVER: Few small hypodense lesions present likely related to cysts or hemangiomata. No enhancing lesions. BILE DUCTS: Nondilated.  GALLBLADDER: The gallbladder is not distended and without calcified stones.  PANCREAS: Scattered punctate calcifications suggesting chronic pancreatitis. 17 mm cystic lesion about the pancreatic head. No acute inflammatory process.  SPLEEN: Within normal limits.  ADRENAL GLANDS: Within normal limits.  KIDNEYS, URETERS, and BLADDER: The kidneys enhance symmetrically without focal lesion.  No hydroureteronephrosis bilaterally.Bladder decompressed about a catheter.  VESSELS: There is no aneurysmal dilatation of the abdominal aorta. The IVC is within normal limits.  BOWEL: Hiatal hernia present. There is no bowel obstruction or appreciable bowel wall thickening.  Appendix is normal.  No focal diverticular disease.  PERITONEUM/RETROPERITONEUM/LYMPH NODES: No ascites or free air, no fluid collection.  No retroperitoneal fluid collection or lymphadenopathy.  REPRODUCTIVE ORGANS: Patient appears to be status post hysterectomy. There is extensive streak artifact throughout the pelvis from bilateral hip arthroplasty.  ABDOMINAL WALL: Unremarkable.  BONE AND SOFT TISSUE: Scoliosis and degenerative changes throughout the spine. Bilateral hip arthroplasty resulting in marked artifact about the pelvis.      1.  No acute intra-abdominal process. 2. 17 mm hypodense lesion about the pancreatic head in the setting of chronic pancreatitis. Further evaluation with elective MRI recommended. 3. Few hypodensities within the liver likely related to cysts or hemangiomata. 4. Hiatal hernia.    XR CHEST 1V FRONTAL PORT    Result Date: 9/8/2023  * * *Final Report* * * DATE OF EXAM: Sep  8 2023 11:04AM   HCX   [id number]  -  XR CHEST 1V FRONTAL PORT  / ACCESSION #  [id number] PROCEDURE REASON: Sepsis      * * * * Physician Interpretation * * * * RESULT:  EXAMINATION:  CHEST RADIOGRAPH (PORTABLE SINGLE VIEW AP) Exam Date/Time:  9/8/2023 11:04 AM CLINICAL HISTORY: Sepsis MQ:  XCPR_5 Comparison:  08/28/2023 RESULT: Lines, tubes, and devices:  None. Lungs and pleura:  Stable central peribronchial thickening.  No focal consolidation, pleural effusion or pneumothorax. Cardiomediastinal silhouette:  Stable cardiomediastinal silhouette. Other:  Degenerative changes.    IMPRESSION: Stable central peribronchial thickening.  No acute radiographic abnormality. Transcribed Using Voice Recognition Transcribe Date/Time: Sep  8 2023 11:23A Dictated by: EMILY CERVANTES MD This examination was interpreted and the report reviewed and electronically signed by: EMILY CERVANTES MD on Sep  8 2023 11:24AM  EST        Physical Exam     Constitutional   General appearance: Alert and in no acute distress.     Pulmonary   Respiratory assessment: No respiratory distress, normal respiratory rhythm and effort.    Auscultation of Lungs: Clear bilateral breath sounds.   Cardiovascular   Auscultation of heart: Apical pulse normal, heart rate and rhythm normal, normal S1 and S2, no murmurs and no pericardial rub.    Exam for edema: No peripheral edema.   Abdomen   Abdominal Exam: No bruits, normal bowel sounds, soft, non-tender, no abdominal mass palpated.    Liver and Spleen exam: No hepato-splenomegaly.   Musculoskeletal   Examination of gait: Normal.    Inspection of digits and nails: No clubbing or cyanosis of the fingernails.    Inspection/palpation of joints, bones and muscles: No joint swelling. Normal movement of all extremities.   Skin   Skin inspection: Normal skin color and pigmentation, normal skin turgor and no visible rash.   Neurologic   Cranial nerves: Nerves 2-12 were intact, no focal neuro defects.     Assessment/Plan    #COVID-pneumonia  Currently on remdesivir  Denies any shortness of breath    #Depression with anxiety  Continue home medications    #Hyponatremia  Check urine  osmolality    #Hypokalemia  Replace    #Chronic Samson catheter  Exchange Samson catheter  Continue Flomax  #UTI  Start IV antibiotics  Urine culture sent and pending

## 2023-10-08 NOTE — SIGNIFICANT EVENT
Significant hyponatremia Na 121 yesterday  - may be related to seroquel 25 mg hs and sertraline 50 mg however these are reduced doses - half the medication she came in on, so decrease more likely related to fluid balance.   Possible UTI  Both affect mental status  No change in psych treatment until physical condition improves    MOCA 20/30 suggestive of mild dementia - pt can return home however she is uncertain she can take care of herself without help - ideally will qualify for acute rehab for debility.   Has not been cooperative with therapies - reports not feeling well enough (COVID).     MoCA  Visuospatial/Executive: 2  Naming: 3  Memory: 0  Attention: Read List of Digits: 1  Attention: Read List of Letters: 1  Attention: Serial Sevens: 0  Language: Repeat: 2  Language: Fluency: 0  Abstraction: 2  Delayed Recall: 3  Orientation: 6  Add 1 Point if </=12 yr Education: 0  MOCA Total Score: 20

## 2023-10-08 NOTE — CARE PLAN
The patient's goals for the shift include      The clinical goals for the shift include patient remains comfy this shift    Over the shift, the patient did not make progress toward the following goals.

## 2023-10-08 NOTE — CARE PLAN
MOCA 20  Still need pt/ot  Today should be day 3 with iv thiamine  Need final recs from psych dc planning,

## 2023-10-09 LAB
ANION GAP SERPL CALC-SCNC: 9 MMOL/L (ref 10–20)
BUN SERPL-MCNC: 13 MG/DL (ref 6–23)
CALCIUM SERPL-MCNC: 7.9 MG/DL (ref 8.6–10.3)
CHLORIDE SERPL-SCNC: 93 MMOL/L (ref 98–107)
CO2 SERPL-SCNC: 26 MMOL/L (ref 21–32)
CREAT SERPL-MCNC: 0.52 MG/DL (ref 0.5–1.05)
ERYTHROCYTE [DISTWIDTH] IN BLOOD BY AUTOMATED COUNT: 13.9 % (ref 11.5–14.5)
GFR SERPL CREATININE-BSD FRML MDRD: >90 ML/MIN/1.73M*2
GLUCOSE SERPL-MCNC: 105 MG/DL (ref 74–99)
HCT VFR BLD AUTO: 35 % (ref 36–46)
HGB BLD-MCNC: 12.5 G/DL (ref 12–16)
MCH RBC QN AUTO: 32.1 PG (ref 26–34)
MCHC RBC AUTO-ENTMCNC: 35.7 G/DL (ref 32–36)
MCV RBC AUTO: 90 FL (ref 80–100)
NRBC BLD-RTO: 0 /100 WBCS (ref 0–0)
PLATELET # BLD AUTO: 259 X10*3/UL (ref 150–450)
PMV BLD AUTO: 10.8 FL (ref 7.5–11.5)
POTASSIUM SERPL-SCNC: 3.7 MMOL/L (ref 3.5–5.3)
RBC # BLD AUTO: 3.9 X10*6/UL (ref 4–5.2)
SODIUM SERPL-SCNC: 124 MMOL/L (ref 136–145)
WBC # BLD AUTO: 6.6 X10*3/UL (ref 4.4–11.3)

## 2023-10-09 PROCEDURE — 97530 THERAPEUTIC ACTIVITIES: CPT | Mod: GP,59

## 2023-10-09 PROCEDURE — 2500000004 HC RX 250 GENERAL PHARMACY W/ HCPCS (ALT 636 FOR OP/ED): Performed by: PSYCHIATRY & NEUROLOGY

## 2023-10-09 PROCEDURE — 96372 THER/PROPH/DIAG INJ SC/IM: CPT | Performed by: FAMILY MEDICINE

## 2023-10-09 PROCEDURE — 2500000001 HC RX 250 WO HCPCS SELF ADMINISTERED DRUGS (ALT 637 FOR MEDICARE OP): Performed by: PHYSICIAN ASSISTANT

## 2023-10-09 PROCEDURE — 2500000001 HC RX 250 WO HCPCS SELF ADMINISTERED DRUGS (ALT 637 FOR MEDICARE OP): Performed by: FAMILY MEDICINE

## 2023-10-09 PROCEDURE — 97112 NEUROMUSCULAR REEDUCATION: CPT | Mod: GP,59

## 2023-10-09 PROCEDURE — 36415 COLL VENOUS BLD VENIPUNCTURE: CPT | Performed by: FAMILY MEDICINE

## 2023-10-09 PROCEDURE — 1100000001 HC PRIVATE ROOM DAILY

## 2023-10-09 PROCEDURE — 2500000004 HC RX 250 GENERAL PHARMACY W/ HCPCS (ALT 636 FOR OP/ED): Performed by: FAMILY MEDICINE

## 2023-10-09 PROCEDURE — 2500000004 HC RX 250 GENERAL PHARMACY W/ HCPCS (ALT 636 FOR OP/ED): Performed by: INTERNAL MEDICINE

## 2023-10-09 PROCEDURE — 2500000004 HC RX 250 GENERAL PHARMACY W/ HCPCS (ALT 636 FOR OP/ED): Performed by: NURSE PRACTITIONER

## 2023-10-09 PROCEDURE — 36415 COLL VENOUS BLD VENIPUNCTURE: CPT | Performed by: INTERNAL MEDICINE

## 2023-10-09 PROCEDURE — 85027 COMPLETE CBC AUTOMATED: CPT | Performed by: INTERNAL MEDICINE

## 2023-10-09 PROCEDURE — 97162 PT EVAL MOD COMPLEX 30 MIN: CPT | Mod: GP

## 2023-10-09 PROCEDURE — 82374 ASSAY BLOOD CARBON DIOXIDE: CPT | Performed by: FAMILY MEDICINE

## 2023-10-09 RX ADMIN — PROPRANOLOL HYDROCHLORIDE 20 MG: 10 TABLET ORAL at 09:19

## 2023-10-09 RX ADMIN — SERTRALINE HYDROCHLORIDE 50 MG: 50 TABLET ORAL at 09:20

## 2023-10-09 RX ADMIN — LEVOTHYROXINE SODIUM 25 MCG: 0.03 TABLET ORAL at 09:19

## 2023-10-09 RX ADMIN — POLYETHYLENE GLYCOL (3350) 17 G: 17 POWDER, FOR SOLUTION ORAL at 09:20

## 2023-10-09 RX ADMIN — CLOTRIMAZOLE AND BETAMETHASONE DIPROPIONATE 1 APPLICATION: 10; .64 CREAM TOPICAL at 09:18

## 2023-10-09 RX ADMIN — PROPRANOLOL HYDROCHLORIDE 20 MG: 10 TABLET ORAL at 20:18

## 2023-10-09 RX ADMIN — TIMOLOL MALEATE 1 DROP: 5 SOLUTION/ DROPS OPHTHALMIC at 09:21

## 2023-10-09 RX ADMIN — PANTOPRAZOLE SODIUM 20 MG: 20 TABLET, DELAYED RELEASE ORAL at 06:19

## 2023-10-09 RX ADMIN — QUETIAPINE FUMARATE 25 MG: 25 TABLET ORAL at 20:17

## 2023-10-09 RX ADMIN — ENOXAPARIN SODIUM 40 MG: 40 INJECTION SUBCUTANEOUS at 09:20

## 2023-10-09 RX ADMIN — TAMSULOSIN HYDROCHLORIDE 0.4 MG: 0.4 CAPSULE ORAL at 20:17

## 2023-10-09 RX ADMIN — CLOTRIMAZOLE AND BETAMETHASONE DIPROPIONATE 1 APPLICATION: 10; .64 CREAM TOPICAL at 22:25

## 2023-10-09 RX ADMIN — CYCLOSPORINE 1 DROP: 0.5 EMULSION OPHTHALMIC at 06:23

## 2023-10-09 RX ADMIN — BRIMONIDINE TARTRATE 1 DROP: 2 SOLUTION/ DROPS OPHTHALMIC at 06:16

## 2023-10-09 RX ADMIN — CYCLOSPORINE 1 DROP: 0.5 EMULSION OPHTHALMIC at 17:35

## 2023-10-09 RX ADMIN — Medication 3 MG: at 20:17

## 2023-10-09 RX ADMIN — CEFTRIAXONE SODIUM 1 G: 1 INJECTION, SOLUTION INTRAVENOUS at 14:43

## 2023-10-09 RX ADMIN — BRIMONIDINE TARTRATE 1 DROP: 2 SOLUTION/ DROPS OPHTHALMIC at 17:29

## 2023-10-09 RX ADMIN — LATANOPROST 1 DROP: 50 SOLUTION OPHTHALMIC at 22:26

## 2023-10-09 ASSESSMENT — COGNITIVE AND FUNCTIONAL STATUS - GENERAL
MOVING TO AND FROM BED TO CHAIR: A LITTLE
MOBILITY SCORE: 20
WALKING IN HOSPITAL ROOM: A LITTLE
STANDING UP FROM CHAIR USING ARMS: A LITTLE
CLIMB 3 TO 5 STEPS WITH RAILING: A LITTLE
MOBILITY SCORE: 18
TOILETING: A LITTLE
HELP NEEDED FOR BATHING: A LITTLE
STANDING UP FROM CHAIR USING ARMS: A LITTLE
MOVING FROM LYING ON BACK TO SITTING ON SIDE OF FLAT BED WITH BEDRAILS: A LITTLE
DAILY ACTIVITIY SCORE: 19
TURNING FROM BACK TO SIDE WHILE IN FLAT BAD: A LITTLE
MOVING TO AND FROM BED TO CHAIR: A LITTLE
DRESSING REGULAR LOWER BODY CLOTHING: A LITTLE
TOILETING: A LITTLE
PERSONAL GROOMING: A LITTLE
HELP NEEDED FOR BATHING: A LITTLE
WALKING IN HOSPITAL ROOM: A LITTLE
DAILY ACTIVITIY SCORE: 19
CLIMB 3 TO 5 STEPS WITH RAILING: A LITTLE
PERSONAL GROOMING: A LITTLE
DRESSING REGULAR LOWER BODY CLOTHING: A LITTLE
DRESSING REGULAR UPPER BODY CLOTHING: A LITTLE
DRESSING REGULAR UPPER BODY CLOTHING: A LITTLE

## 2023-10-09 ASSESSMENT — PAIN SCALES - GENERAL
PAINLEVEL_OUTOF10: 0 - NO PAIN

## 2023-10-09 ASSESSMENT — ACTIVITIES OF DAILY LIVING (ADL): ADL_ASSISTANCE: INDEPENDENT

## 2023-10-09 ASSESSMENT — PAIN - FUNCTIONAL ASSESSMENT
PAIN_FUNCTIONAL_ASSESSMENT: 0-10

## 2023-10-09 NOTE — PROGRESS NOTES
Qi Davila is a 82 y.o. female     Positive urinalysis concerning for work catheter associated UTI  The Samson catheter would need to be changed at some point as last time it was changed was 4 weeks ago  Pt seen this morning   She is not feeling well  Denies any pain   Does seem anxious    Review of Systems     Constitutional: no fever, no chills,    Cardiovascular: the heart rate was not slow, the heart rate was not fast, no chest pain, no palpitations, no intermittent leg claudication and no lower extremity edema.   Respiratory: no cough, wheezing or shortness of breath at rest or exertion  Gastrointestinal: no abdominal pain, no constipation, no melena, no nausea, no diarrhea, no vomiting and no blood in stools.   Musculoskeletal: no arthralgias, no myalgias, no back pain, no joint swelling, no joint stiffness, no limb pain and no limb swelling.   Integumentary: no skin rashes, no skin lesions, no itching, no skin wound and no dry skin.   Neurological: no headache, no confusion, no numbness, no dizziness, no tingling and no fainting.   All other systems have been reviewed and are negative for complaint.       Vitals:    10/09/23 1540   BP: 140/64   Pulse: 74   Resp: 18   Temp: 36.2 °C (97.1 °F)   SpO2: 94%        Scheduled medications  brimonidine, 1 drop, Both Eyes, q12h  cefTRIAXone, 1 g, intravenous, q24h  clotrimazole-betamethasone, 1 Application, Topical, BID  cycloSPORINE, 1 drop, Both Eyes, q12h  enoxaparin, 40 mg, subcutaneous, Daily  latanoprost, 1 drop, Both Eyes, Nightly  levothyroxine, 25 mcg, oral, Daily  melatonin, 3 mg, oral, Nightly  pantoprazole, 20 mg, oral, Daily before breakfast  polyethylene glycol, 17 g, oral, Daily  propranolol, 20 mg, oral, BID  QUEtiapine, 25 mg, oral, Nightly  sertraline, 50 mg, oral, Daily  tamsulosin, 0.4 mg, oral, Nightly  timolol, 1 drop, Both Eyes, Daily      Continuous medications     PRN medications  PRN medications: fluticasone, oxygen, prochlorperazine    Lab  Review   Results from last 7 days   Lab Units 10/09/23  0635 10/08/23  1016 10/07/23  0649   WBC AUTO x10*3/uL 6.6 7.0 7.1   HEMOGLOBIN g/dL 12.5 12.5 12.1   HEMATOCRIT % 35.0* 34.9* 34.2*   PLATELETS AUTO x10*3/uL 259 238 229       Results from last 7 days   Lab Units 10/07/23  1600 10/07/23  0649 10/06/23  0806   SODIUM mmol/L 121* 122* 123*   POTASSIUM mmol/L 3.3* 3.1* 3.6   CHLORIDE mmol/L 90* 91* 89*   CO2 mmol/L 26 25 26   BUN mg/dL 13 13 13   CREATININE mg/dL 0.41* 0.41* 0.48*   CALCIUM mg/dL 7.3* 7.5* 7.9*   GLUCOSE mg/dL 117* 97 103*              XR chest 2 views    Result Date: 10/2/2023  Interpreted By:  Stacey Howell, STUDY: XR CHEST 2 VIEWS;  10/2/2023 12:09 pm   INDICATION: Signs/Symptoms:productive cough.   COMPARISON: None.   ACCESSION NUMBER(S): [id number]   ORDERING CLINICIAN: JAZMIN SANDERSON   FINDINGS: No consolidation. No pleural effusion or pneumothorax. Atherosclerosis. Cardiomegaly. Diffuse interstitial pulmonary opacities suggestive of pulmonary edema. No acute osseous abnormality. Degenerative changes in the spine.       Cardiomegaly with diffuse interstitial pulmonary opacities suggestive of pulmonary edema. Superimposed infection not excluded.   Signed by: Stacey Howell 10/2/2023 12:17 PM Dictation workstation:   [id number]    CT abdomen pelvis w IV contrast    Result Date: 9/28/2023  Interpreted By:  IGGY FULTON MD MRN: [id number] Patient Name: SHAHANA CARDONA  STUDY: CT ABDOMEN AND PELVIS W IV CONTRAST;  9/28/2023 6:56 am  INDICATION: gen abd pain .  COMPARISON: None.  ACCESSION NUMBER(S): [id number]  ORDERING CLINICIAN: JOSEPHINE ORTIZ  TECHNIQUE: Contiguous axial images were obtained through the abdomen and pelvis after the administration of  75 mL Omnipaque 350  FINDINGS: LOWER CHEST: No confluent airspace disease. Mosaic aeration about the lung bases with mild interstitial prominence.  ABDOMEN:  LIVER: Few small hypodense lesions present likely related to cysts or hemangiomata.  No enhancing lesions. BILE DUCTS: Nondilated.  GALLBLADDER: The gallbladder is not distended and without calcified stones.  PANCREAS: Scattered punctate calcifications suggesting chronic pancreatitis. 17 mm cystic lesion about the pancreatic head. No acute inflammatory process.  SPLEEN: Within normal limits.  ADRENAL GLANDS: Within normal limits.  KIDNEYS, URETERS, and BLADDER: The kidneys enhance symmetrically without focal lesion.  No hydroureteronephrosis bilaterally.Bladder decompressed about a catheter.  VESSELS: There is no aneurysmal dilatation of the abdominal aorta. The IVC is within normal limits.  BOWEL: Hiatal hernia present. There is no bowel obstruction or appreciable bowel wall thickening.  Appendix is normal.  No focal diverticular disease.  PERITONEUM/RETROPERITONEUM/LYMPH NODES: No ascites or free air, no fluid collection.  No retroperitoneal fluid collection or lymphadenopathy.  REPRODUCTIVE ORGANS: Patient appears to be status post hysterectomy. There is extensive streak artifact throughout the pelvis from bilateral hip arthroplasty.  ABDOMINAL WALL: Unremarkable.  BONE AND SOFT TISSUE: Scoliosis and degenerative changes throughout the spine. Bilateral hip arthroplasty resulting in marked artifact about the pelvis.      1.  No acute intra-abdominal process. 2. 17 mm hypodense lesion about the pancreatic head in the setting of chronic pancreatitis. Further evaluation with elective MRI recommended. 3. Few hypodensities within the liver likely related to cysts or hemangiomata. 4. Hiatal hernia.    XR CHEST 1V FRONTAL PORT    Result Date: 9/8/2023  * * *Final Report* * * DATE OF EXAM: Sep  8 2023 11:04AM   HCX   [id number]  -  XR CHEST 1V FRONTAL PORT  / ACCESSION #  [id number] PROCEDURE REASON: Sepsis      * * * * Physician Interpretation * * * * RESULT: EXAMINATION:  CHEST RADIOGRAPH (PORTABLE SINGLE VIEW AP) Exam Date/Time:  9/8/2023 11:04 AM CLINICAL HISTORY: Sepsis MQ:  XCPR_5 Comparison:  08/28/2023  RESULT: Lines, tubes, and devices:  None. Lungs and pleura:  Stable central peribronchial thickening.  No focal consolidation, pleural effusion or pneumothorax. Cardiomediastinal silhouette:  Stable cardiomediastinal silhouette. Other:  Degenerative changes.    IMPRESSION: Stable central peribronchial thickening.  No acute radiographic abnormality. Transcribed Using Voice Recognition Transcribe Date/Time: Sep  8 2023 11:23A Dictated by: EIMLY CERVANTES MD This examination was interpreted and the report reviewed and electronically signed by: EMILY CERVANTES MD on Sep  8 2023 11:24AM  EST        Physical Exam     Constitutional   General appearance: Alert and in no acute distress.     Pulmonary   Respiratory assessment: No respiratory distress, normal respiratory rhythm and effort.    Auscultation of Lungs: Clear bilateral breath sounds.   Cardiovascular   Auscultation of heart: Apical pulse normal, heart rate and rhythm normal, normal S1 and S2, no murmurs and no pericardial rub.    Exam for edema: No peripheral edema.   Abdomen   Abdominal Exam: No bruits, normal bowel sounds, soft, non-tender, no abdominal mass palpated.    Liver and Spleen exam: No hepato-splenomegaly.   Musculoskeletal   Examination of gait: Normal.    Inspection of digits and nails: No clubbing or cyanosis of the fingernails.    Inspection/palpation of joints, bones and muscles: No joint swelling. Normal movement of all extremities.   Skin   Skin inspection: Normal skin color and pigmentation, normal skin turgor and no visible rash.   Neurologic   Cranial nerves: Nerves 2-12 were intact, no focal neuro defects.    pt does have de la garza in pase    Assessment/Plan    #COVID-pneumonia  Completed 5 days of remdesivir  Denies any shortness of breath    #Depression with anxiety  Continue home medications    #Hyponatremia  Check urine osmolality    #Hypokalemia     #Chronic De La Garza catheter  Exchange De La Garza catheter  Continue Flomax  #UTI  Start IV  antibiotics  Urine culture sent and pending  Cont with rocephin    Hyponatremmia will check again,       Angel Drew MD

## 2023-10-09 NOTE — CARE PLAN
Problem: Balance  Goal: STG - Maintains dynamic standing balance with upper extremity support  Description: INTERVENTIONS:  1. Practice standing with minimal support.  2. Educate patient about standing tolerance.  3. Educate patient about independence with gait, transfers, and ADL's.  4. Educate patient about use of assistive device.  5. Educate patient about self-directed care.  Outcome: Progressing     Problem: Mobility  Goal: STG - Patient will ambulate  Description: >/= 75', device PRN, MOD I, no LOB     Outcome: Progressing     Problem: Safety  Goal: Goal 1  Description: Pt will verbalize and apply safety awareness and precautions 100% of time throughout entire session     Outcome: Progressing     Problem: Transfers  Goal: STG - Patient will perform bed mobility  Description: At MOD I level, safely, no LOB  Outcome: Progressing  Goal: STG - Patient will transfer sit to and from stand  Description: At MOD I level, safely, no LOB   Outcome: Progressing

## 2023-10-09 NOTE — CARE PLAN
The patient's goals for the shift include      The clinical goals for the shift include remain comfortable

## 2023-10-09 NOTE — PROGRESS NOTES
"Physical Therapy    Physical Therapy Evaluation & Treatment    Patient Name: Qi Davila  MRN: [id number]  Today's Date: 10/9/2023   Time Calculation  Start Time: 1345  Stop Time: 1430  Time Calculation (min): 45 min    Assessment/Plan   PT Assessment  PT Assessment Results: Decreased strength, Decreased endurance, Impaired balance, Decreased mobility, Decreased cognition, Impaired judgement, Decreased safety awareness  Rehab Prognosis: Fair  Evaluation/Treatment Tolerance: Patient limited by fatigue  Medical Staff Made Aware: Yes  Strengths: Ability to acquire knowledge, Attitude of self, Housing layout, Living arrangement secure  Barriers to Participation: Comorbidities, Coping skills, Insight into problems  End of Session Communication: Bedside nurse, Care Coordinator, Physician  Assessment Comment: PT eval complete. Pt COVID+, however, moving fairly well other than endurance and pt reported feeling of \"weakness.\" Pt willing to participate and wants to be up and moving. Needing continued PT for addressing functional deficits.  End of Session Patient Position: Bed, 2 rail up, Alarm on  IP OR SWING BED PT PLAN  Inpatient or Swing Bed: Inpatient  PT Plan  Treatment/Interventions: Bed mobility, Transfer training, Gait training, Balance training, Neuromuscular re-education, Strengthening, Endurance training, Therapeutic exercise, Therapeutic activity, Home exercise program, Positioning  PT Plan: Skilled PT  PT Frequency: 3 times per week  PT Discharge Recommendations: Low intensity level of continued care, 24 hr supervision due to cognition  Equipment Recommended upon Discharge: Wheeled walker  PT Recommended Transfer Status: Stand by assist, Assist x1      Subjective     General Visit Information:  General  Reason for Referral: Weakness due to COVID+  Referred By: Jose Carlisle APRN-CNP  Past Medical History Relevant to Rehab: Pt is an 82 year old female presenting with abdominal pain, cough, fatigue, anxiety, and " reported suicidal ideation. Pt seen in ED and did test positive for COVID. She has never been vaccinated.  Pt started on decadron and admitted. Did get dose of remdesivir in the ED. Hx of HTN, HLD, urinary retention, anxiety/depression.  Missed Visit: Yes  Missed Visit Reason: Patient refused  Prior to Session Communication: Bedside nurse, Care Coordinator  Patient Position Received: Bed, 3 rail up, Alarm on  Home Living:  Home Living  Type of Home: Apartment  Lives With: Alone  Home Layout: One level  Home Access: Elevator  Home Living Comments: Pt lives in senior living apartments  Prior Level of Function:  Prior Function Per Pt/Caregiver Report  Level of [place]: Independent with ADLs and functional transfers  ADL Assistance: Independent  Homemaking Assistance: Independent  Ambulatory Assistance: Independent  Prior Function Comments: Per patient, independent with all mobility and self care prior to admission  Precautions:  Precautions  Medical Precautions: Infection precautions (COVID+)  Precautions Comment: Mild confusion  Vital Signs:       Objective   Pain:  Pain Assessment  Pain Assessment: 0-10  Pain Score: 0 - No pain (Does not comment on pain, merely states feels better when upright sitting)  Cognition:  Cognition  Overall Cognitive Status: Impaired  Orientation Level: Oriented X4  Safety Judgment: Decreased awareness of need for safety precautions  Problem Solving: Assistance required to identify errors made  Cognition Comments: Noted some lack of safety awareness as well as downplaying of cognitive errors made during session.  Attention: Exceptions to WFL  Divided Attention: Impaired  Sustained Attention: Impaired  Memory: Exceptions to WFL  Short-Term Memory: Impaired  Working Memory: Impaired  Memory Comments: Pt appears to fluctuate in terms of recognition of progression of time and mixing up days/places  Problem Solving: Exceptions to WFL  Complex Functional Tasks: Impaired  Simple Functional  Tasks:  (Generally intact)  Abstract Reasoning: Exceptions to WFL  Abstract Thinking Not Consistent: Minimal  Safety/Judgement: Exceptions to WFL  Complex Functional Tasks: Moderate  Novel Situations: Moderate  Routine Tasks: Minimal  Unable to Self-Monitor and Self-Correct Consistently: Minimal  Insight: Moderate  Impulsive: Mildly  Task Initiation: Initiates with cues  Planning: Reduced planning skills  Organization: Mildly disorganized    General Assessments:                Activity Tolerance  Endurance: Tolerates 30 min exercise with multiple rests  Activity Tolerance Comments: Seated rest breaks x3    Sensation  Sensation Comment: Denies numbness/tingling    Strength  Strength Comments: Pt reports weakness, but manages all tasks /c minimal assist                Postural Control  Postural Control: Within Functional Limits    Static Sitting Balance  Static Sitting-Balance Support: Feet supported, No upper extremity supported  Static Sitting-Level of Assistance: Distant supervision  Static Sitting-Comment/Number of Minutes: >/= 5 minutes x3  Dynamic Sitting Balance  Dynamic Sitting-Balance Support: Feet supported, No upper extremity supported  Dynamic Sitting-Balance: Trunk control activities  Dynamic Sitting-Comments: Moving on toilet for hygiene /s LOB or hands on assist    Static Standing Balance  Static Standing-Balance Support: Bilateral upper extremity supported  Static Standing-Level of Assistance: Close supervision  Static Standing-Comment/Number of Minutes: >/= 2 minutes x 2 trials  Dynamic Standing Balance  Dynamic Standing-Balance Support: Bilateral upper extremity supported  Dynamic Standing-Balance: Turning (and ambulation)  Dynamic Standing-Comments: Ambulation and shifting/turning, 1 minor LOB  Functional Assessments:  Bed Mobility  Bed Mobility: Yes  Bed Mobility 1  Bed Mobility 1: Supine to sitting  Level of Assistance 1: Distant supervision  Bed Mobility Comments 1: No hands on assist, increased  time, reports fatigue  Bed Mobility 2  Bed Mobility  2: Sitting to supine  Level of Assistance 2: Close supervision  Bed Mobility Comments 2: No hands on assist needed, increased time, multiple trials    Transfers  Transfer: Yes  Transfer 1  Technique 1: Sit to stand  Transfer Device 1: Walker  Transfer Level of Assistance 1: Minimum assistance  Trials/Comments 1: Initial stand with minor LOB, needing Min Ax1 for correction, but second trial from toilet at SBA without need for hands on assist, use of grab bar.  Transfers 2  Technique 2: Stand to sit  Transfer Device 2: Walker  Transfer Level of Assistance 2: Close supervision  Trials/Comments 2: Sitting with cues for eccentric control and hand placement, no hands on assist for either trial    Ambulation/Gait Training  Ambulation/Gait Training Performed: Yes  Ambulation/Gait Training 1  Surface 1: Level tile  Device 1: Rolling walker  Assistance 1: Close supervision  Quality of Gait 1: Diminished heel strike (Wider SHAUNA, decreased foot clearance, slowed herberth, no LOB)  Comments/Distance (ft) 1: 20', 10', 5' (Ambulation from far side of bed to restroom, restroom to chair, then chair to bed.)    Stairs  Stairs: No  Extremity/Trunk Assessments:  RUE   RUE : Within Functional Limits  LUE   LUE: Within Functional Limits  RLE   RLE : Within Functional Limits  LLE   LLE : Within Functional Limits  Treatments:  See as documented in note above  Outcome Measures:  [facility] Basic Mobility  Turning from your back to your side while in a flat bed without using bedrails: A little  Moving from lying on your back to sitting on the side of a flat bed without using bedrails: A little  Moving to and from bed to chair (including a wheelchair): A little  Standing up from a chair using your arms (e.g. wheelchair or bedside chair): A little  To walk in hospital room: A little  Climbing 3-5 steps with railing: A little  Basic Mobility - Total Score: 18    Encounter Problems       Encounter  Problems (Active)       Balance       STG - Maintains dynamic standing balance with upper extremity support (Progressing)       Start:  10/09/23    Expected End:  10/23/23       INTERVENTIONS:  1. Practice standing with minimal support.  2. Educate patient about standing tolerance.  3. Educate patient about independence with gait, transfers, and ADL's.  4. Educate patient about use of assistive device.  5. Educate patient about self-directed care.            Mobility       STG - Patient will ambulate (Progressing)       Start:  10/09/23    Expected End:  10/23/23       >/= 75', device PRN, MOD I, no LOB               Safety       Goal 1 (Progressing)       Start:  10/09/23    Expected End:  10/23/23       Pt will verbalize and apply safety awareness and precautions 100% of time throughout entire session               Transfers       STG - Patient will perform bed mobility (Progressing)       Start:  10/09/23    Expected End:  10/23/23       At MOD I level, safely, no LOB         STG - Patient will transfer sit to and from stand (Progressing)       Start:  10/09/23    Expected End:  10/23/23       At MOD I level, safely, no LOB                 Education Documentation  Precautions, taught by Luis A Fuller, PT at 10/9/2023  3:28 PM.  Learner: Patient  Readiness: Acceptance  Method: Demonstration  Response: Verbalizes Understanding, Needs Reinforcement    Mobility Training, taught by Luis A Fuller, PT at 10/9/2023  3:28 PM.  Learner: Patient  Readiness: Acceptance  Method: Demonstration  Response: Verbalizes Understanding, Needs Reinforcement    Education Comments  No comments found.

## 2023-10-10 LAB
ANION GAP SERPL CALC-SCNC: 8 MMOL/L (ref 10–20)
BUN SERPL-MCNC: 12 MG/DL (ref 6–23)
CALCIUM SERPL-MCNC: 8.1 MG/DL (ref 8.6–10.3)
CHLORIDE SERPL-SCNC: 93 MMOL/L (ref 98–107)
CO2 SERPL-SCNC: 27 MMOL/L (ref 21–32)
CREAT SERPL-MCNC: 0.45 MG/DL (ref 0.5–1.05)
ERYTHROCYTE [DISTWIDTH] IN BLOOD BY AUTOMATED COUNT: 14.1 % (ref 11.5–14.5)
GFR SERPL CREATININE-BSD FRML MDRD: >90 ML/MIN/1.73M*2
GLUCOSE SERPL-MCNC: 103 MG/DL (ref 74–99)
HCT VFR BLD AUTO: 34.8 % (ref 36–46)
HGB BLD-MCNC: 12.1 G/DL (ref 12–16)
MCH RBC QN AUTO: 31.3 PG (ref 26–34)
MCHC RBC AUTO-ENTMCNC: 34.8 G/DL (ref 32–36)
MCV RBC AUTO: 90 FL (ref 80–100)
NRBC BLD-RTO: 0 /100 WBCS (ref 0–0)
PLATELET # BLD AUTO: 262 X10*3/UL (ref 150–450)
PMV BLD AUTO: 10.4 FL (ref 7.5–11.5)
POTASSIUM SERPL-SCNC: 3.8 MMOL/L (ref 3.5–5.3)
RBC # BLD AUTO: 3.87 X10*6/UL (ref 4–5.2)
SODIUM SERPL-SCNC: 124 MMOL/L (ref 136–145)
WBC # BLD AUTO: 6.8 X10*3/UL (ref 4.4–11.3)

## 2023-10-10 PROCEDURE — 2500000004 HC RX 250 GENERAL PHARMACY W/ HCPCS (ALT 636 FOR OP/ED): Performed by: NURSE PRACTITIONER

## 2023-10-10 PROCEDURE — 97535 SELF CARE MNGMENT TRAINING: CPT | Mod: GO

## 2023-10-10 PROCEDURE — 1100000001 HC PRIVATE ROOM DAILY

## 2023-10-10 PROCEDURE — 97167 OT EVAL HIGH COMPLEX 60 MIN: CPT | Mod: GO

## 2023-10-10 PROCEDURE — 2500000004 HC RX 250 GENERAL PHARMACY W/ HCPCS (ALT 636 FOR OP/ED): Performed by: FAMILY MEDICINE

## 2023-10-10 PROCEDURE — 2500000004 HC RX 250 GENERAL PHARMACY W/ HCPCS (ALT 636 FOR OP/ED): Performed by: INTERNAL MEDICINE

## 2023-10-10 PROCEDURE — 96372 THER/PROPH/DIAG INJ SC/IM: CPT | Performed by: FAMILY MEDICINE

## 2023-10-10 PROCEDURE — 2500000004 HC RX 250 GENERAL PHARMACY W/ HCPCS (ALT 636 FOR OP/ED): Performed by: PSYCHIATRY & NEUROLOGY

## 2023-10-10 PROCEDURE — 36415 COLL VENOUS BLD VENIPUNCTURE: CPT | Performed by: FAMILY MEDICINE

## 2023-10-10 PROCEDURE — 2500000001 HC RX 250 WO HCPCS SELF ADMINISTERED DRUGS (ALT 637 FOR MEDICARE OP): Performed by: PHYSICIAN ASSISTANT

## 2023-10-10 PROCEDURE — 36415 COLL VENOUS BLD VENIPUNCTURE: CPT | Performed by: INTERNAL MEDICINE

## 2023-10-10 PROCEDURE — 2500000001 HC RX 250 WO HCPCS SELF ADMINISTERED DRUGS (ALT 637 FOR MEDICARE OP): Performed by: FAMILY MEDICINE

## 2023-10-10 PROCEDURE — 92526 ORAL FUNCTION THERAPY: CPT | Mod: GN | Performed by: SPEECH-LANGUAGE PATHOLOGIST

## 2023-10-10 PROCEDURE — 85027 COMPLETE CBC AUTOMATED: CPT | Performed by: INTERNAL MEDICINE

## 2023-10-10 PROCEDURE — 80048 BASIC METABOLIC PNL TOTAL CA: CPT | Performed by: FAMILY MEDICINE

## 2023-10-10 RX ORDER — TOLVAPTAN 30 MG/1
15 TABLET ORAL ONCE
Status: COMPLETED | OUTPATIENT
Start: 2023-10-10 | End: 2023-10-10

## 2023-10-10 RX ADMIN — LATANOPROST 1 DROP: 50 SOLUTION OPHTHALMIC at 21:00

## 2023-10-10 RX ADMIN — CEFTRIAXONE SODIUM 1 G: 1 INJECTION, SOLUTION INTRAVENOUS at 13:55

## 2023-10-10 RX ADMIN — BRIMONIDINE TARTRATE 1 DROP: 2 SOLUTION/ DROPS OPHTHALMIC at 05:28

## 2023-10-10 RX ADMIN — CYCLOSPORINE 1 DROP: 0.5 EMULSION OPHTHALMIC at 05:27

## 2023-10-10 RX ADMIN — TIMOLOL MALEATE 1 DROP: 5 SOLUTION/ DROPS OPHTHALMIC at 08:58

## 2023-10-10 RX ADMIN — SERTRALINE HYDROCHLORIDE 50 MG: 50 TABLET ORAL at 08:56

## 2023-10-10 RX ADMIN — CLOTRIMAZOLE AND BETAMETHASONE DIPROPIONATE 1 APPLICATION: 10; .64 CREAM TOPICAL at 21:00

## 2023-10-10 RX ADMIN — PROPRANOLOL HYDROCHLORIDE 20 MG: 10 TABLET ORAL at 08:56

## 2023-10-10 RX ADMIN — PROPRANOLOL HYDROCHLORIDE 20 MG: 10 TABLET ORAL at 20:27

## 2023-10-10 RX ADMIN — LEVOTHYROXINE SODIUM 25 MCG: 0.03 TABLET ORAL at 08:56

## 2023-10-10 RX ADMIN — TAMSULOSIN HYDROCHLORIDE 0.4 MG: 0.4 CAPSULE ORAL at 20:29

## 2023-10-10 RX ADMIN — CYCLOSPORINE 1 DROP: 0.5 EMULSION OPHTHALMIC at 17:16

## 2023-10-10 RX ADMIN — BRIMONIDINE TARTRATE 1 DROP: 2 SOLUTION/ DROPS OPHTHALMIC at 17:16

## 2023-10-10 RX ADMIN — TOLVAPTAN 15 MG: 30 TABLET ORAL at 17:16

## 2023-10-10 RX ADMIN — PANTOPRAZOLE SODIUM 20 MG: 20 TABLET, DELAYED RELEASE ORAL at 06:58

## 2023-10-10 RX ADMIN — ENOXAPARIN SODIUM 40 MG: 40 INJECTION SUBCUTANEOUS at 08:56

## 2023-10-10 RX ADMIN — Medication 3 MG: at 20:27

## 2023-10-10 RX ADMIN — QUETIAPINE FUMARATE 25 MG: 25 TABLET ORAL at 20:27

## 2023-10-10 RX ADMIN — CLOTRIMAZOLE AND BETAMETHASONE DIPROPIONATE 1 APPLICATION: 10; .64 CREAM TOPICAL at 08:57

## 2023-10-10 ASSESSMENT — COGNITIVE AND FUNCTIONAL STATUS - GENERAL
DRESSING REGULAR UPPER BODY CLOTHING: A LITTLE
DAILY ACTIVITIY SCORE: 19
HELP NEEDED FOR BATHING: A LOT
TOILETING: A LITTLE
HELP NEEDED FOR BATHING: A LITTLE
MOVING FROM LYING ON BACK TO SITTING ON SIDE OF FLAT BED WITH BEDRAILS: A LITTLE
MOVING TO AND FROM BED TO CHAIR: A LITTLE
DRESSING REGULAR LOWER BODY CLOTHING: A LITTLE
PERSONAL GROOMING: A LITTLE
TOILETING: A LITTLE
CLIMB 3 TO 5 STEPS WITH RAILING: A LITTLE
PERSONAL GROOMING: A LITTLE
MOVING TO AND FROM BED TO CHAIR: A LITTLE
STANDING UP FROM CHAIR USING ARMS: A LITTLE
MOBILITY SCORE: 18
WALKING IN HOSPITAL ROOM: A LITTLE
DAILY ACTIVITIY SCORE: 17
WALKING IN HOSPITAL ROOM: A LITTLE
TOILETING: A LITTLE
DRESSING REGULAR UPPER BODY CLOTHING: A LITTLE
DRESSING REGULAR LOWER BODY CLOTHING: A LOT
PERSONAL GROOMING: A LITTLE
STANDING UP FROM CHAIR USING ARMS: A LITTLE
TURNING FROM BACK TO SIDE WHILE IN FLAT BAD: A LITTLE
CLIMB 3 TO 5 STEPS WITH RAILING: A LITTLE
MOBILITY SCORE: 20
DRESSING REGULAR LOWER BODY CLOTHING: A LITTLE
DAILY ACTIVITIY SCORE: 19
HELP NEEDED FOR BATHING: A LITTLE
DRESSING REGULAR UPPER BODY CLOTHING: A LITTLE

## 2023-10-10 ASSESSMENT — PAIN SCALES - GENERAL
PAINLEVEL_OUTOF10: 0 - NO PAIN

## 2023-10-10 ASSESSMENT — PAIN - FUNCTIONAL ASSESSMENT
PAIN_FUNCTIONAL_ASSESSMENT: 0-10

## 2023-10-10 ASSESSMENT — ACTIVITIES OF DAILY LIVING (ADL): HOME_MANAGEMENT_TIME_ENTRY: 14

## 2023-10-10 NOTE — PROGRESS NOTES
Occupational Therapy    Evaluation/Treatment    Patient Name: Qi Davila  MRN: [id number]  : 1940  Today's Date: 10/10/23  Time Calculation  Start Time: 1315  Stop Time: 1344  Time Calculation (min): 29 min       Assessment:  OT Assessment: Pt seen for OT eval. Pt demonstrates with mild decrease STM, assist with bed mobility and ADLS.  Prognosis: Good  Barriers to Discharge: None  Evaluation/Treatment Tolerance: Patient limited by fatigue  Medical Staff Made Aware: Yes  Prognosis: Good  Barriers to Discharge: None  Evaluation/Treatment Tolerance: Patient limited by fatigue  Medical Staff Made Aware: Yes  Plan:  Treatment Interventions: ADL retraining, Functional transfer training, Endurance training, Cognitive reorientation  OT Frequency: 3 times per week  OT Discharge Recommendations: Moderate intensity level of continued care  Treatment Interventions: ADL retraining, Functional transfer training, Endurance training, Cognitive reorientation    Subjective   Current Problem:  1. COVID-19          General:   OT Received On: 10/10/23  General  Reason for Referral: Weakness due to COVID+  Referred By: TREASURE Kim-CNP  Past Medical History Relevant to Rehab: Pt is an 82 year old female presenting with abdominal pain, cough, fatigue, anxiety, and reported suicidal ideation. Pt seen in ED and did test positive for COVID. She has never been vaccinated.  Pt started on decadron and admitted. Did get dose of remdesivir in the ED. Hx of HTN, HLD, urinary retention, anxiety/depression.  General Comment: Pt cleared by nursing and pt agreeable to OT eval  Precautions:  Precautions Comment: Mild confusion (falls recautions)  Vital Signs:     Pain:  Pain Assessment  Pain Assessment: 0-10  Pain Score: 0 - No pain    Objective   Cognition:  Orientation Level: Oriented X4  Attention: Exceptions to WFL (easily distract, mild decreased memory)  Memory: Exceptions to WFL  Short-Term Memory:  (mild decreae STM)            Home Living:  Type of Home: Apartment  Lives With: Alone  Home Adaptive Equipment: Sock aid, Reacher, Cane (shower chair, grab bar)  Home Layout: One level  Home Access: Elevator (4th floor apt)  Bathroom Shower/Tub: Walk-in shower  Bathroom Toilet: Handicapped height  Bathroom Equipment: Grab bars in shower, Shower chair with back  Prior Function:  Level of [place]: Independent with ADLs and functional transfers, Independent with homemaking with ambulation  IADL History:     ADL:  LE Dressing Assistance: Moderate  LE Dressing Deficit: Don/doff R sock, Don/doff L sock, Thread RLE into pants, Thread LLE into pants, Steadying  Toileting Deficit: Perineal hygiene  Functional Assistance: Stand by  Functional Deficit: Toilet transfer, Increased time to complete  Activities of Daily Living:         Toileting  Toileting Level of Assistance: Minimum assistance, Minimal verbal cues (close supoervision/SBA)  Where Assessed: Toilet  Toileting Comments: completed toilet transfer and hygiee with close supervision/SBA utilized grab bar for transfers  Activity Tolerance:  Endurance: Tolerates 10 - 20 min exercise with multiple rests  Therapy/Activity: Therapeutic Activity  Therapeutic Activity Performed: Yes  Therapeutic Activity 1: ambulated to [facility] with wheeled walker and CGA-Min A 10 feet x 2       Vision:Vision - Basic Assessment  Current Vision: No visual deficits  Sensation:     Strength:  Strength Comments: WFL      Hand Function:  Hand Function  Gross Grasp: Functional  Coordination: Functional  Extremities: RUE   RUE : Within Functional Limits and LUE   LUE: Within Functional Limits      Outcome Measures: [facility] Daily Activity  Putting on and taking off regular lower body clothing: A lot  Bathing (including washing, rinsing, drying): A lot  Putting on and taking off regular upper body clothing: A little  Toileting, which includes using toilet, bedpan or urinal: A little  Taking care of personal grooming such as  brushing teeth: A little  Eating Meals: None  Daily Activity - Total Score: 17      Education Documentation  Precautions, taught by Kaylee You OT at 10/10/2023  2:36 PM.  Learner: Patient  Readiness: Acceptance  Method: Explanation  Response: Verbalizes Understanding, Needs Reinforcement    Home Exercise Program, taught by Kaylee You OT at 10/10/2023  2:36 PM.  Learner: Patient  Readiness: Acceptance  Method: Explanation  Response: Verbalizes Understanding, Needs Reinforcement    ADL Training, taught by Kaylee You OT at 10/10/2023  2:36 PM.  Learner: Patient  Readiness: Acceptance  Method: Explanation  Response: Verbalizes Understanding, Needs Reinforcement    Education Comments  No comments found.      Goals:  Encounter Problems       Encounter Problems (Active)       ADLs       Patient will perform UB and LB bathing 75% with modified independent level of assistance and adaptive equipment prn  (Progressing)       Start:  10/10/23    Expected End:  10/24/23            Patient with complete lower body dressing with minimal assist  level of assistance donning and doffing all LE clothes  with PRN adaptive equipment while supported sitting (Progressing)       Start:  10/10/23    Expected End:  10/24/23            Patient will complete toileting including hygiene clothing management/hygiene with modified independent level of assistance and Adaptive equipment prn  (Progressing)       Start:  10/10/23    Expected End:  10/24/23               Balance       STG - Maintains dynamic standing balance with upper extremity support (Progressing)       Start:  10/09/23    Expected End:  10/23/23       INTERVENTIONS:  1. Practice standing with minimal support.  2. Educate patient about standing tolerance.  3. Educate patient about independence with gait, transfers, and ADL's.  4. Educate patient about use of assistive device.  5. Educate patient about self-directed care.            COGNITION/SAFETY       Pt  will recall events of tx session with 90% accuracy with cues prn  (Progressing)       Start:  10/10/23    Expected End:  10/24/23               Mobility       STG - Patient will ambulate (Progressing)       Start:  10/09/23    Expected End:  10/23/23       >/= 75', device PRN, MOD I, no LOB               Safety       Goal 1 (Progressing)       Start:  10/09/23    Expected End:  10/23/23       Pt will verbalize and apply safety awareness and precautions 100% of time throughout entire session               TRANSFERS       Patient will complete sit to stand transfer with supervision level of assistance and least restrictive device in order to improve safety and prepare for out of bed mobility. (Progressing)       Start:  10/10/23    Expected End:  10/24/23               Transfers       STG - Patient will perform bed mobility (Progressing)       Start:  10/09/23    Expected End:  10/23/23       At MOD I level, safely, no LOB         STG - Patient will transfer sit to and from stand (Progressing)       Start:  10/09/23    Expected End:  10/23/23       At MOD I level, safely, no LOB

## 2023-10-10 NOTE — CONSULTS
Inpatient consult to Nephrology  Consult performed by: Richie Fernandez DO  Consult ordered by: TREASURE Kim-CNP        Reason For Consult  Hyponatremia     History Of Present Illness  Qi Davila is a 82 y.o. female with a past medical history of headache,  depression, anxiety, suicidal ideation, arthritis, GERD, glaucoma, thyroid disorder, obstructive sleep apnea and chronic hyponatremia who presented with headache and suicidal ideation on 10/2.  She was found to be COVID-positive.  She has required supplemental O2.  Was seen by infectious disease and placed on remdesivir.  She was found to have a serum sodium of 124 mmol/a liter with normal renal function.  Review of her labs shows chronic hyponatremia.  She is on an SSRI.  Prior hyponatremia work-up is noted with a low uric acid of 2.2, TSH is normal, no cortisol albeit blood pressures are generous.  Her serum osmolality is 259, urine osmolality of 450 with a spot urine sodium of 18.  She remains in COVID isolation.  Nephrology is consulted for renal care.     Past Medical History  She has a past medical history of Personal history of other mental and behavioral disorders and Personal history of other specified conditions (01/10/2022).    Surgical History  She has a past surgical history that includes MR angio head wo IV contrast (1/5/2012).     Social History  She reports that she has never smoked. She has never used smokeless tobacco. No history on file for alcohol use and drug use.    Family History  Reviewed and noncontributory to current admission     Allergies  Hydrocodone, Hydrocodone-acetaminophen, Latex, Penicillins, and Prednisone    Review of Systems  12 point review of systems obtained and negative unless stated in HPI  Physical Exam  Constitutional: Elderly female, alert and oriented x3 in no acute distress  Eyes: Normal external exam.   Ears, Nose, Mouth, and Throat: External inspection of ears and nose: Normal.    Neck: No neck mass was  "observed. Supple. Thyroid not enlarged.  Cardiovascular: Normal heart rate and rhythm, normal S1 and S2, no gallops, no murmurs and no pericardial rub.   Pulmonary: No respiratory distress. Clear bilateral breath sounds.   Abdomen: Soft nontender; no abdominal mass palpated. Normal bowel sounds.   Musculoskeletal: No joint swelling   Skin: Warm and dry  Neurologic: Cranial nerves 2-12 grossly intact. Non focal  Lymphatic: No cervical lymphadenopathy.     Last Recorded Vitals  Blood pressure 138/81, pulse 61, temperature 36.4 °C (97.5 °F), temperature source Temporal, resp. rate 18, height 1.626 m (5' 4\"), weight 88.5 kg (195 lb 1.7 oz), SpO2 96 %.    Relevant Results  Lab Results   Component Value Date    WBC 6.8 10/10/2023    HGB 12.1 10/10/2023    HCT 34.8 (L) 10/10/2023    MCV 90 10/10/2023     10/10/2023      Lab Results   Component Value Date    GLUCOSE 103 (H) 10/10/2023    CALCIUM 8.1 (L) 10/10/2023     (L) 10/10/2023    K 3.8 10/10/2023    CO2 27 10/10/2023    CL 93 (L) 10/10/2023    BUN 12 10/10/2023    CREATININE 0.45 (L) 10/10/2023      Scheduled medications  brimonidine, 1 drop, Both Eyes, q12h  cefTRIAXone, 1 g, intravenous, q24h  clotrimazole-betamethasone, 1 Application, Topical, BID  cycloSPORINE, 1 drop, Both Eyes, q12h  enoxaparin, 40 mg, subcutaneous, Daily  latanoprost, 1 drop, Both Eyes, Nightly  levothyroxine, 25 mcg, oral, Daily  melatonin, 3 mg, oral, Nightly  pantoprazole, 20 mg, oral, Daily before breakfast  polyethylene glycol, 17 g, oral, Daily  propranolol, 20 mg, oral, BID  QUEtiapine, 25 mg, oral, Nightly  sertraline, 50 mg, oral, Daily  tamsulosin, 0.4 mg, oral, Nightly  timolol, 1 drop, Both Eyes, Daily      Continuous medications     PRN medications  PRN medications: fluticasone, oxygen, prochlorperazine       Assessment/Plan   Qi Davila is a 82 y.o. female with a past medical history of headache,  depression, anxiety, suicidal ideation, arthritis, GERD, glaucoma, " thyroid disorder, obstructive sleep apnea and chronic hyponatremia who presented with headache and suicidal ideation on 10/2.  She was found to be COVID-positive.  She has required supplemental O2.  Was seen by infectious disease and placed on remdesivir.  She was found to have a serum sodium of 124 mmol/a liter with normal renal function.  Review of her labs shows chronic hyponatremia.  She is on an SSRI.  Prior hyponatremia work-up is noted with a low uric acid of 2.2, TSH is normal, no cortisol albeit blood pressures are generous.  Her serum osmolality is 259, urine osmolality of 450 with a spot urine sodium of 18.  She remains in COVID isolation.  Nephrology is consulted for renal care.     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  Her sodium remains in the low 120s. She is asymptomatic. She was not previously on fluid restriction.  I will order a dose of tolvaptan 15 mg.  She has taken Zoloft for greater than 10 years and she was having suicidal ideation thus we will not change this medication.  She will need fluid restriction moving forward.  Given chest plain film findings it may not be ideal to place her on salt tabs.  Trend her RFP, will follow with her care.    I spent 50 minutes in the professional and overall care of this patient.

## 2023-10-10 NOTE — PROGRESS NOTES
INPATIENT PROGRESS NOTES    PRIMARY SERVICE: Angel Drew MD         10/10/2023  11 31am    INTERVAL HPI: Pt feels weak  Nad  No specific complaints    Sodium is low  Pt tells me she does not like the food  Has 5-6 cups of water and various liquids at bed side       PERTINENT ROS:  REVIEW OF SYSTEMS  GENERAL: negative for fever, SEE HPI  RESPIRATORY: Negative for cough, wheezing or shortness of breath.  CARDIOVASCULAR: Negative for chest pain, leg swelling or palpitations.  GI: Negative for abdominal discomfort, blood in stools or black stools or change in bowel habits  NEURO: no change per nursing  All other reviewed and negative other than HPI.      MEDICATIONS:    No current facility-administered medications on file prior to encounter.     Current Outpatient Medications on File Prior to Encounter   Medication Sig Dispense Refill    busPIRone (Buspar) 5 mg tablet Take 1 tablet (5 mg) by mouth 2 times a day.      clobetasol (Temovate) 0.05 % external solution Apply 1 Application topically 2 times a day.      clotrimazole-betamethasone (Lotrisone) cream Apply 1 Application topically 2 times a day.      famciclovir (Famvir) 500 mg tablet Take 1 tablet (500 mg) by mouth 1 time if needed. With oubreak      latanoprost (Xalatan) 0.005 % ophthalmic solution Administer 1 drop into both eyes once daily at bedtime.      levothyroxine (Synthroid, Levoxyl) 25 mcg tablet Take 1 tablet (25 mcg) by mouth once daily.      propranolol (Inderal) 20 mg tablet Take 1 tablet (20 mg) by mouth 2 times a day.      Restasis 0.05 % ophthalmic emulsion Administer 1 drop into both eyes every 12 hours.      sertraline (Zoloft) 50 mg tablet Take 2 tablets (100 mg) by mouth once daily.      Alphagan P 0.1 % ophthalmic solution Administer 1 drop into both eyes every 12 hours.      ciclopirox 1 % shampoo Apply 1 Application topically once daily at bedtime.      dexlansoprazole (Dexilant) 30 mg DR capsule Take 1 capsule (30 mg) by mouth once  daily.      fish oil concentrate (Omega-3) 120-180 mg capsule Take 1 capsule (1 g) by mouth once daily.      fluticasone (Flonase) 50 mcg/actuation nasal spray Administer 1 spray into each nostril once daily.      QUEtiapine (SEROquel) 50 mg tablet Take 1 tablet (50 mg) by mouth once daily at bedtime.      timolol (Timoptic) 0.5 % ophthalmic solution Administer 1 drop into both eyes once daily.           PHYSICAL EXAM:   Vitals:    10/09/23 1909 10/10/23 0057 10/10/23 0500 10/10/23 0826   BP: 144/64 106/56 115/54 148/69   BP Location: Left arm Left arm Left arm Right arm   Patient Position: Lying Lying Lying Lying   Pulse: 70 60 56 60   Resp: 18 16 16 18   Temp: 36.5 °C (97.7 °F) 36.4 °C (97.5 °F) 36.4 °C (97.5 °F) 36.8 °C (98.2 °F)   TempSrc:  Oral Oral Temporal   SpO2: 96% 97% 96% 96%   Weight:       Height:            PHYSICAL EXAMINATION:    General appearance: thin frail elderly   Skin: skin color, texture, turgor normal,   HEENT: Anicteric sclera.  Oropharynx mucosa moist  Neck: Supple, no adenopathy;   Back: no pain to palpation over spine or costovertebral angles,   Lungs: clear to auscultation, no wheezing or rhonchi  Heart: RRR without murmur, gallop, or rubs.   Abdomen: Abdomen soft, non-tender. Bowel sounds normal. No masses, organomegaly   does have chr de la garza   Extremities: Extremities normal. No  edema, or skin discoloration.   Musculoskeletal: Spine range of motion normal. Muscular strength intact  Neuro: Oriented X  3    DATA: CBC, Coags, BMP, Mg, Phos   Results for orders placed or performed during the hospital encounter of 10/02/23 (from the past 96 hour(s))   CBC   Result Value Ref Range    WBC 7.1 4.4 - 11.3 x10*3/uL    nRBC 0.0 0.0 - 0.0 /100 WBCs    RBC 3.77 (L) 4.00 - 5.20 x10*6/uL    Hemoglobin 12.1 12.0 - 16.0 g/dL    Hematocrit 34.2 (L) 36.0 - 46.0 %    MCV 91 80 - 100 fL    MCH 32.1 26.0 - 34.0 pg    MCHC 35.4 32.0 - 36.0 g/dL    RDW 14.1 11.5 - 14.5 %    Platelets 229 150 - 450 x10*3/uL     MPV 10.6 7.5 - 11.5 fL   Basic Metabolic Panel   Result Value Ref Range    Glucose 97 74 - 99 mg/dL    Sodium 122 (L) 136 - 145 mmol/L    Potassium 3.1 (L) 3.5 - 5.3 mmol/L    Chloride 91 (L) 98 - 107 mmol/L    Bicarbonate 25 21 - 32 mmol/L    Anion Gap 9 (L) 10 - 20 mmol/L    Urea Nitrogen 13 6 - 23 mg/dL    Creatinine 0.41 (L) 0.50 - 1.05 mg/dL    eGFR >90 >60 mL/min/1.73m*2    Calcium 7.5 (L) 8.6 - 10.3 mg/dL   Uric Acid   Result Value Ref Range    Uric Acid 2.2 (L) 2.3 - 6.7 mg/dL   Osmolality   Result Value Ref Range    Osmolality, Serum 259 (L) 280 - 300 mOsm/kg   TSH with reflex to Free T4 if abnormal   Result Value Ref Range    Thyroid Stimulating Hormone 1.74 0.44 - 3.98 mIU/L   Basic metabolic panel   Result Value Ref Range    Glucose 117 (H) 74 - 99 mg/dL    Sodium 121 (L) 136 - 145 mmol/L    Potassium 3.3 (L) 3.5 - 5.3 mmol/L    Chloride 90 (L) 98 - 107 mmol/L    Bicarbonate 26 21 - 32 mmol/L    Anion Gap 8 (L) 10 - 20 mmol/L    Urea Nitrogen 13 6 - 23 mg/dL    Creatinine 0.41 (L) 0.50 - 1.05 mg/dL    eGFR >90 >60 mL/min/1.73m*2    Calcium 7.3 (L) 8.6 - 10.3 mg/dL   Drug Screen, Urine With Reflex to Confirmation   Result Value Ref Range    Amphetamine Screen, Urine Presumptive Negative Presumptive Negative    Barbiturate Screen, Urine Presumptive Negative Presumptive Negative    Benzodiazepines Screen, Urine Presumptive Negative Presumptive Negative    Cannabinoid Screen, Urine Presumptive Negative Presumptive Negative    Cocaine Metabolite Screen, Urine Presumptive Negative Presumptive Negative    Fentanyl Screen, Urine Presumptive Negative Presumptive Negative    Opiate Screen, Urine Presumptive Negative Presumptive Negative    Oxycodone Screen, Urine Presumptive Negative Presumptive Negative    PCP Screen, Urine Presumptive Negative Presumptive Negative   Sodium, Urine Random   Result Value Ref Range    Sodium, Urine Random 18 mmol/L    Creatinine, Urine Random 67.9 20.0 - 320.0 mg/dL     Sodium/Creatinine Ratio 27 Not established. mmol/g Creat   Osmolality, Urine   Result Value Ref Range    Osmolality, Urine Random 450 200 - 1,200 mOsm/kg   Urinalysis with Reflex Microscopic   Result Value Ref Range    Color, Urine Yellow Straw, Yellow    Appearance, Urine Cloudy (N) Clear    Specific Gravity, Urine 1.015 1.005 - 1.035    pH, Urine 7.0 5.0, 5.5, 6.0, 6.5, 7.0, 7.5, 8.0    Protein, Urine 100 (2+) (N) NEGATIVE mg/dL    Glucose, Urine NEGATIVE NEGATIVE mg/dL    Blood, Urine MODERATE (2+) (A) NEGATIVE    Ketones, Urine NEGATIVE NEGATIVE mg/dL    Bilirubin, Urine NEGATIVE NEGATIVE    Urobilinogen, Urine 4.0 (N) <2.0 mg/dL    Nitrite, Urine NEGATIVE NEGATIVE    Leukocyte Esterase, Urine LARGE (3+) (A) NEGATIVE   Urinalysis Microscopic Only   Result Value Ref Range    WBC, Urine >50 (A) 1-5, NONE /HPF    RBC, Urine 11-20 (A) NONE, 1-2, 3-5 /HPF    Bacteria, Urine 3+ (A) NONE SEEN /HPF    Mucus, Urine 1+ Reference range not established. /LPF    Triple Phosphate Crystals, Urine 1+ NONE, 1+ /HPF    Amorphous Crystals, Urine 1+ NONE, 1+, 2+ /HPF   CBC   Result Value Ref Range    WBC 7.0 4.4 - 11.3 x10*3/uL    nRBC 0.0 0.0 - 0.0 /100 WBCs    RBC 3.87 (L) 4.00 - 5.20 x10*6/uL    Hemoglobin 12.5 12.0 - 16.0 g/dL    Hematocrit 34.9 (L) 36.0 - 46.0 %    MCV 90 80 - 100 fL    MCH 32.3 26.0 - 34.0 pg    MCHC 35.8 32.0 - 36.0 g/dL    RDW 13.9 11.5 - 14.5 %    Platelets 238 150 - 450 x10*3/uL    MPV 10.8 7.5 - 11.5 fL   POCT GLUCOSE   Result Value Ref Range    POCT Glucose 130 (H) 74 - 99 mg/dL   CBC   Result Value Ref Range    WBC 6.6 4.4 - 11.3 x10*3/uL    nRBC 0.0 0.0 - 0.0 /100 WBCs    RBC 3.90 (L) 4.00 - 5.20 x10*6/uL    Hemoglobin 12.5 12.0 - 16.0 g/dL    Hematocrit 35.0 (L) 36.0 - 46.0 %    MCV 90 80 - 100 fL    MCH 32.1 26.0 - 34.0 pg    MCHC 35.7 32.0 - 36.0 g/dL    RDW 13.9 11.5 - 14.5 %    Platelets 259 150 - 450 x10*3/uL    MPV 10.8 7.5 - 11.5 fL   Basic metabolic panel   Result Value Ref Range    Glucose  105 (H) 74 - 99 mg/dL    Sodium 124 (L) 136 - 145 mmol/L    Potassium 3.7 3.5 - 5.3 mmol/L    Chloride 93 (L) 98 - 107 mmol/L    Bicarbonate 26 21 - 32 mmol/L    Anion Gap 9 (L) 10 - 20 mmol/L    Urea Nitrogen 13 6 - 23 mg/dL    Creatinine 0.52 0.50 - 1.05 mg/dL    eGFR >90 >60 mL/min/1.73m*2    Calcium 7.9 (L) 8.6 - 10.3 mg/dL   CBC   Result Value Ref Range    WBC 6.8 4.4 - 11.3 x10*3/uL    nRBC 0.0 0.0 - 0.0 /100 WBCs    RBC 3.87 (L) 4.00 - 5.20 x10*6/uL    Hemoglobin 12.1 12.0 - 16.0 g/dL    Hematocrit 34.8 (L) 36.0 - 46.0 %    MCV 90 80 - 100 fL    MCH 31.3 26.0 - 34.0 pg    MCHC 34.8 32.0 - 36.0 g/dL    RDW 14.1 11.5 - 14.5 %    Platelets 262 150 - 450 x10*3/uL    MPV 10.4 7.5 - 11.5 fL   Basic metabolic panel   Result Value Ref Range    Glucose 103 (H) 74 - 99 mg/dL    Sodium 124 (L) 136 - 145 mmol/L    Potassium 3.8 3.5 - 5.3 mmol/L    Chloride 93 (L) 98 - 107 mmol/L    Bicarbonate 27 21 - 32 mmol/L    Anion Gap 8 (L) 10 - 20 mmol/L    Urea Nitrogen 12 6 - 23 mg/dL    Creatinine 0.45 (L) 0.50 - 1.05 mg/dL    eGFR >90 >60 mL/min/1.73m*2    Calcium 8.1 (L) 8.6 - 10.3 mg/dL           ASSESSMENT AND PLAN:     Principal Problem:    COVID-19  Active Problems:    Anxiety    Pancreatic lesion      #COVID-pneumonia  Completed 5 days of remdesivir  Denies any shortness of breath  May come out of isolation if OK with infection control nurse  ID signed off       #Depression with anxiety  Continue home medications     #Hyponatremia  Check urine osmolality     #Hypokalemia     #Chronic De La Garza catheter  Exchange De La Garza catheter  Continue Flomax    #UTI (CAUTI)  C/wIV antibiotics  Urine culture  pending >. Not clear if ever sent   Cont with rocephin  Needs de la garza changed, last done 09/11/23  Needs to be changed every 4 weeks      Hyponatremmia persists  Renal consulted D/w DrGaines     PTOT        Plan of care discussed with: Provider, RN, Patient.      Patient case and plan of care discussed with Dr. JESSICA Drew.    Jose GALVIN  TREASURE Carlisle - CNP  -In collaboration with Dr. JESSICA Drew    [facility]  Office: [phone]  Fax: [phone]  I have reviewed the above note obtained and documented by the NP/PA and I personally participated in the key components. I have discussed the case and management of the patient's care. Changes made to the note, and all key components of history and physical/progress note done by me.  dw nursing  Renal consulted and ntoed iput   Siadh  Cont with fluid restriction  Reassess tomorrow  Angel Drew MD

## 2023-10-10 NOTE — CARE PLAN
Problem: COGNITION/SAFETY  Goal: Pt will recall events of tx session with 90% accuracy with cues prn   Outcome: Progressing     Problem: ADLs  Goal: Patient will perform UB and LB bathing 75% with modified independent level of assistance and adaptive equipment prn   Outcome: Progressing  Goal: Patient with complete lower body dressing with minimal assist  level of assistance donning and doffing all LE clothes  with PRN adaptive equipment while supported sitting  Outcome: Progressing  Goal: Patient will complete toileting including hygiene clothing management/hygiene with modified independent level of assistance and Adaptive equipment prn   Outcome: Progressing     Problem: TRANSFERS  Goal: Patient will complete sit to stand transfer with supervision level of assistance and least restrictive device in order to improve safety and prepare for out of bed mobility.  Outcome: Progressing

## 2023-10-10 NOTE — PROGRESS NOTES
Speech-Language Pathology    Inpatient  Speech-Language Pathology Treatment     Patient Name: Qi Davila  MRN: [id number]  Today's Date: 10/10/2023   Time in: 14:20  Time out: 14:33         Current Problem:   Patient Active Problem List       COVID-19    Anxiety    Pancreatic lesion         SLP Assessment:  Prognosis: Good  Treatment Provided: Yes  Treatment Tolerance: Patient tolerated treatment well  Medical Staff Made Aware: Yes  Strengths: Cognition  Education Provided: Yes; Pt educated on prescribed diet and use of safe swallow strategies.        Plan:  Inpatient/Swing Bed or Outpatient: Inpatient  SLP TX Plan: Continue Plan of Care  SLP Frequency: 1x per week  Duration: 2 weeks  SLP Discharge Recommendations: Other (Comment)  Discussed POC: Patient  Discussed Risks/Benefits: Yes  Patient/Caregiver Agreeable: Yes  SLP - OK to Discharge: Yes      Most Recent Visit:  SLP Received On: 10/10/23      General Visit Information:  Reason for Referral: Concern for aspiration  Referred By: Rajendra  Past Medical History Relevant to Rehab: 3 mo hx of dysphagia, depression, suicidal thoughts  Patient Seen During This Visit: Yes  Prior to Session Communication: Bedside nurse      Pain Assessment:  Pain Assessment: 0-10  Pain Score: 0 - No pain      Objective   Tolerate regular diet with no s/s of aspiration 100% of the time.      Therapeutic Swallow:  Therapeutic Swallow Intervention : PO Trials  Solid Diet Recommendations: Regular (IDDSI Level 7)  Liquid Diet Recommendations: Thin (IDDSI Level 0)  Compensatory Strategies: Add moisture to solids  Swallow Comments: Pt seen for a check-in on toleration of regular diet. Presented with thin liquid and solid texture. Pt drank consecutive sips of thin via straw. Pt presented with prolonged mastication and was cued to wash down residue with thin liquid. Pt consumed all trials with no reguritation which was a previous complaint however pt noted this was no longer an issue. Pt  coughed following trials but no concern for aspiration because pt was coughing prior to trials. Pt expresses intrest in  added moisture to solids to help decrease oral residue.

## 2023-10-10 NOTE — CARE PLAN
The patient's goals for the shift include Pt. will have a safe, restful and uneventful evening    The clinical goals for the shift include Pt. will tolerate CPAP throughout the night    Problem: Risk for Suicide  Goal: Read Safety Guidelines this shift  10/9/2023 2049 by Rebeca Orantes RN  Outcome: Progressing  10/9/2023 2049 by Rebeca Orantes RN  Outcome: Progressing  Goal: Complete Mental Health Safety Plan (psychiatry only) this shift  10/9/2023 2049 by Rebeca Orantes RN  Outcome: Progressing  10/9/2023 2049 by Rebeca Orantes RN  Outcome: Progressing     Problem: Nutrition  Goal: Oral intake greater than 50%  10/9/2023 2049 by Rebeca Orantes RN  Outcome: Progressing  10/9/2023 2049 by Rebeca Orantes RN  Outcome: Progressing  Goal: Oral intake greater 75%  10/9/2023 2049 by Rebeca Orantes RN  Outcome: Progressing  10/9/2023 2049 by Rebeca Orantes RN  Outcome: Progressing  Goal: Consume prescribed supplement  10/9/2023 2049 by Rebeca Orantes RN  Outcome: Progressing  10/9/2023 2049 by Rebeca Orantes RN  Outcome: Progressing  Goal: Adequate PO fluid intake  10/9/2023 2049 by Rebeca Orantes RN  Outcome: Progressing  10/9/2023 2049 by Rebeca Orantes RN  Outcome: Progressing  Goal: Nutrition support goals are met within 48 hrs  10/9/2023 2049 by Rebeca Orantes RN  Outcome: Progressing  10/9/2023 2049 by Rebeca Orantes RN  Outcome: Progressing  Goal: Nutrition support is meeting 75% of nutrient needs  10/9/2023 2049 by Rebeca Orantes RN  Outcome: Progressing  10/9/2023 2049 by Rebeca Orantes RN  Outcome: Progressing  Goal: BG  mg/dL  10/9/2023 2049 by Rebeca Orantes RN  Outcome: Progressing  10/9/2023 2049 by Rebeca Orantes RN  Outcome: Progressing  Goal: Lab values WNL  10/9/2023 2049 by Rebeca Orantes RN  Outcome: Progressing  10/9/2023 2049 by Rebeca Orantes RN  Outcome: Progressing  Goal: Electrolytes WNL  Outcome: Progressing  Goal: Promote healing  Outcome:  Progressing  Goal: Maintain stable weight  Outcome: Progressing  Goal: Reduce weight from edema/fluid  Outcome: Progressing  Goal: Gradual weight gain  Outcome: Progressing  Goal: Improve ostomy output  Outcome: Progressing     Problem: Pain  Goal: Takes deep breaths with improved pain control throughout the shift  Outcome: Progressing  Goal: Turns in bed with improved pain control throughout the shift  Outcome: Progressing  Goal: Walks with improved pain control throughout the shift  Outcome: Progressing  Goal: Performs ADL's with improved pain control throughout shift  Outcome: Progressing  Goal: Participates in PT with improved pain control throughout the shift  Outcome: Progressing  Goal: Free from opioid side effects throughout the shift  Outcome: Progressing  Goal: Free from acute confusion related to pain meds throughout the shift  Outcome: Progressing

## 2023-10-11 LAB
ALBUMIN SERPL BCP-MCNC: 3.5 G/DL (ref 3.4–5)
ANION GAP SERPL CALC-SCNC: 10 MMOL/L (ref 10–20)
ANION GAP SERPL CALC-SCNC: 10 MMOL/L (ref 10–20)
BUN SERPL-MCNC: 13 MG/DL (ref 6–23)
BUN SERPL-MCNC: 14 MG/DL (ref 6–23)
CALCIUM SERPL-MCNC: 8.2 MG/DL (ref 8.6–10.3)
CALCIUM SERPL-MCNC: 8.5 MG/DL (ref 8.6–10.3)
CHLORIDE SERPL-SCNC: 101 MMOL/L (ref 98–107)
CHLORIDE SERPL-SCNC: 98 MMOL/L (ref 98–107)
CO2 SERPL-SCNC: 25 MMOL/L (ref 21–32)
CO2 SERPL-SCNC: 27 MMOL/L (ref 21–32)
CREAT SERPL-MCNC: 0.49 MG/DL (ref 0.5–1.05)
CREAT SERPL-MCNC: 0.63 MG/DL (ref 0.5–1.05)
GFR SERPL CREATININE-BSD FRML MDRD: 88 ML/MIN/1.73M*2
GFR SERPL CREATININE-BSD FRML MDRD: >90 ML/MIN/1.73M*2
GLUCOSE SERPL-MCNC: 104 MG/DL (ref 74–99)
GLUCOSE SERPL-MCNC: 108 MG/DL (ref 74–99)
PHOSPHATE SERPL-MCNC: 3.8 MG/DL (ref 2.5–4.9)
POTASSIUM SERPL-SCNC: 3.7 MMOL/L (ref 3.5–5.3)
POTASSIUM SERPL-SCNC: 3.7 MMOL/L (ref 3.5–5.3)
SODIUM SERPL-SCNC: 131 MMOL/L (ref 136–145)
SODIUM SERPL-SCNC: 132 MMOL/L (ref 136–145)

## 2023-10-11 PROCEDURE — 96372 THER/PROPH/DIAG INJ SC/IM: CPT | Performed by: FAMILY MEDICINE

## 2023-10-11 PROCEDURE — 36415 COLL VENOUS BLD VENIPUNCTURE: CPT | Performed by: FAMILY MEDICINE

## 2023-10-11 PROCEDURE — 80048 BASIC METABOLIC PNL TOTAL CA: CPT | Mod: CCI | Performed by: INTERNAL MEDICINE

## 2023-10-11 PROCEDURE — 97116 GAIT TRAINING THERAPY: CPT | Mod: GP,59

## 2023-10-11 PROCEDURE — 36415 COLL VENOUS BLD VENIPUNCTURE: CPT | Performed by: INTERNAL MEDICINE

## 2023-10-11 PROCEDURE — 2500000001 HC RX 250 WO HCPCS SELF ADMINISTERED DRUGS (ALT 637 FOR MEDICARE OP): Performed by: PHYSICIAN ASSISTANT

## 2023-10-11 PROCEDURE — 97530 THERAPEUTIC ACTIVITIES: CPT | Mod: GP,59

## 2023-10-11 PROCEDURE — 2500000004 HC RX 250 GENERAL PHARMACY W/ HCPCS (ALT 636 FOR OP/ED): Performed by: INTERNAL MEDICINE

## 2023-10-11 PROCEDURE — 80069 RENAL FUNCTION PANEL: CPT | Performed by: FAMILY MEDICINE

## 2023-10-11 PROCEDURE — 2500000004 HC RX 250 GENERAL PHARMACY W/ HCPCS (ALT 636 FOR OP/ED): Performed by: PSYCHIATRY & NEUROLOGY

## 2023-10-11 PROCEDURE — 87086 URINE CULTURE/COLONY COUNT: CPT | Mod: AHULAB,CMCLAB | Performed by: NURSE PRACTITIONER

## 2023-10-11 PROCEDURE — 97112 NEUROMUSCULAR REEDUCATION: CPT | Mod: GP,59

## 2023-10-11 PROCEDURE — 2500000004 HC RX 250 GENERAL PHARMACY W/ HCPCS (ALT 636 FOR OP/ED): Performed by: NURSE PRACTITIONER

## 2023-10-11 PROCEDURE — 1100000001 HC PRIVATE ROOM DAILY

## 2023-10-11 PROCEDURE — 2500000001 HC RX 250 WO HCPCS SELF ADMINISTERED DRUGS (ALT 637 FOR MEDICARE OP): Performed by: FAMILY MEDICINE

## 2023-10-11 PROCEDURE — 2500000004 HC RX 250 GENERAL PHARMACY W/ HCPCS (ALT 636 FOR OP/ED): Performed by: FAMILY MEDICINE

## 2023-10-11 RX ADMIN — SERTRALINE HYDROCHLORIDE 50 MG: 50 TABLET ORAL at 09:11

## 2023-10-11 RX ADMIN — PROPRANOLOL HYDROCHLORIDE 20 MG: 10 TABLET ORAL at 21:27

## 2023-10-11 RX ADMIN — CYCLOSPORINE 1 DROP: 0.5 EMULSION OPHTHALMIC at 18:00

## 2023-10-11 RX ADMIN — CLOTRIMAZOLE AND BETAMETHASONE DIPROPIONATE 1 APPLICATION: 10; .64 CREAM TOPICAL at 21:28

## 2023-10-11 RX ADMIN — CLOTRIMAZOLE AND BETAMETHASONE DIPROPIONATE 1 APPLICATION: 10; .64 CREAM TOPICAL at 09:12

## 2023-10-11 RX ADMIN — ENOXAPARIN SODIUM 40 MG: 40 INJECTION SUBCUTANEOUS at 09:08

## 2023-10-11 RX ADMIN — TIMOLOL MALEATE 1 DROP: 5 SOLUTION/ DROPS OPHTHALMIC at 09:12

## 2023-10-11 RX ADMIN — QUETIAPINE FUMARATE 25 MG: 25 TABLET ORAL at 21:27

## 2023-10-11 RX ADMIN — CEFTRIAXONE SODIUM 1 G: 1 INJECTION, SOLUTION INTRAVENOUS at 13:55

## 2023-10-11 RX ADMIN — BRIMONIDINE TARTRATE 1 DROP: 2 SOLUTION/ DROPS OPHTHALMIC at 18:02

## 2023-10-11 RX ADMIN — POLYETHYLENE GLYCOL (3350) 17 G: 17 POWDER, FOR SOLUTION ORAL at 09:08

## 2023-10-11 RX ADMIN — Medication 3 MG: at 21:27

## 2023-10-11 RX ADMIN — PROPRANOLOL HYDROCHLORIDE 20 MG: 10 TABLET ORAL at 09:08

## 2023-10-11 RX ADMIN — PANTOPRAZOLE SODIUM 20 MG: 20 TABLET, DELAYED RELEASE ORAL at 07:07

## 2023-10-11 RX ADMIN — CYCLOSPORINE 1 DROP: 0.5 EMULSION OPHTHALMIC at 05:45

## 2023-10-11 RX ADMIN — TAMSULOSIN HYDROCHLORIDE 0.4 MG: 0.4 CAPSULE ORAL at 21:28

## 2023-10-11 RX ADMIN — LEVOTHYROXINE SODIUM 25 MCG: 0.03 TABLET ORAL at 09:08

## 2023-10-11 RX ADMIN — LATANOPROST 1 DROP: 50 SOLUTION OPHTHALMIC at 21:29

## 2023-10-11 RX ADMIN — BRIMONIDINE TARTRATE 1 DROP: 2 SOLUTION/ DROPS OPHTHALMIC at 05:45

## 2023-10-11 ASSESSMENT — PAIN - FUNCTIONAL ASSESSMENT
PAIN_FUNCTIONAL_ASSESSMENT: 0-10
PAIN_FUNCTIONAL_ASSESSMENT: 0-10

## 2023-10-11 ASSESSMENT — PAIN SCALES - GENERAL
PAINLEVEL_OUTOF10: 0 - NO PAIN
PAINLEVEL_OUTOF10: 0 - NO PAIN

## 2023-10-11 ASSESSMENT — COGNITIVE AND FUNCTIONAL STATUS - GENERAL
DRESSING REGULAR UPPER BODY CLOTHING: A LITTLE
PERSONAL GROOMING: A LITTLE
TOILETING: A LITTLE
HELP NEEDED FOR BATHING: A LITTLE
MOVING TO AND FROM BED TO CHAIR: A LITTLE
WALKING IN HOSPITAL ROOM: A LITTLE
MOBILITY SCORE: 19
DRESSING REGULAR LOWER BODY CLOTHING: A LITTLE
CLIMB 3 TO 5 STEPS WITH RAILING: A LOT
WALKING IN HOSPITAL ROOM: A LITTLE
STANDING UP FROM CHAIR USING ARMS: A LITTLE
DAILY ACTIVITIY SCORE: 19
MOBILITY SCORE: 19
STANDING UP FROM CHAIR USING ARMS: A LITTLE
MOVING TO AND FROM BED TO CHAIR: A LITTLE
CLIMB 3 TO 5 STEPS WITH RAILING: A LOT

## 2023-10-11 NOTE — PROGRESS NOTES
10/11/23 1033   Discharge Planning   Living Arrangements Alone   Support Systems Children   Type of Residence Other (Comment)   Do you have animals or pets at home? No   Who is requesting discharge planning? Provider   Home or Post Acute Services Post acute facilities (Rehab/SNF/etc)   Type of Post Acute Facility Services Skilled nursing   Patient expects to be discharged to: Rehab then home   Does the patient need discharge transport arranged? Yes   RoundTrip coordination needed? Yes   Has discharge transport been arranged? No   Patient Choice   Provider Choice list and CMS website (https://medicare.gov/care-compare#search) for post-acute Quality and Resource Measure Data were provided and reviewed with: Family   Patient / Family choosing to utilize agency / facility established prior to hospitalization No     10/11/23 1034 Care Coordinator Note:  Met with patient at bedside to discuss discharge plan.  Informed her of therapy recommendations and she would like to go to rehab.  Provided patient with a SNF list and she chose the following SNFs:  [facility] ([facility])  [facility]  Requested [facility] send referrals.  Patient removed from COVID isolation today.  Anya ESCUDERO    10/11/23 7315   Patient has been accepted at [facility].  Requested auth be started by ESC team.  ADOD: 10/12/23  Anya Hall RN [facility]

## 2023-10-11 NOTE — PROGRESS NOTES
Qi Davila is a 82 y.o. female on day 9 of admission presenting with COVID-19.      Subjective   Seen and examined.  Cleared from COVID precaution.  No acute events, does not offer specific complaints.       Objective      Vitals 24HR  Heart Rate:  [52-63]   Temp:  [36 °C (96.8 °F)-36.9 °C (98.5 °F)]   Resp:  [16-18]   BP: (127-150)/(55-71)   SpO2:  [92 %-99 %]     Intake/Output last 3 Shifts:    Intake/Output Summary (Last 24 hours) at 10/11/2023 1525  Last data filed at 10/11/2023 1129  Gross per 24 hour   Intake 360 ml   Output 3250 ml   Net -2890 ml       Physical Exam    Relevant Results  Results from last 7 days   Lab Units 10/10/23  0552 10/09/23  0635 10/08/23  1016   WBC AUTO x10*3/uL 6.8 6.6 7.0   HEMOGLOBIN g/dL 12.1 12.5 12.5   HEMATOCRIT % 34.8* 35.0* 34.9*   PLATELETS AUTO x10*3/uL 262 259 238     Results from last 7 days   Lab Units 10/11/23  0607 10/10/23  0552 10/09/23  1713   SODIUM mmol/L 132* 124* 124*   POTASSIUM mmol/L 3.7 3.8 3.7   CHLORIDE mmol/L 101 93* 93*   CO2 mmol/L 25 27 26   BUN mg/dL 13 12 13   CREATININE mg/dL 0.49* 0.45* 0.52   GLUCOSE mg/dL 108* 103* 105*   CALCIUM mg/dL 8.2* 8.1* 7.9*       XR chest 2 views   Final Result   Cardiomegaly with diffuse interstitial pulmonary opacities suggestive   of pulmonary edema. Superimposed infection not excluded.        Signed by: Stacey Howell 10/2/2023 12:17 PM   Dictation workstation:   [id number]        Scheduled medications  brimonidine, 1 drop, Both Eyes, q12h  cefTRIAXone, 1 g, intravenous, q24h  clotrimazole-betamethasone, 1 Application, Topical, BID  cycloSPORINE, 1 drop, Both Eyes, q12h  enoxaparin, 40 mg, subcutaneous, Daily  latanoprost, 1 drop, Both Eyes, Nightly  levothyroxine, 25 mcg, oral, Daily  melatonin, 3 mg, oral, Nightly  pantoprazole, 20 mg, oral, Daily before breakfast  polyethylene glycol, 17 g, oral, Daily  propranolol, 20 mg, oral, BID  QUEtiapine, 25 mg, oral, Nightly  sertraline, 50 mg, oral,  Daily  tamsulosin, 0.4 mg, oral, Nightly  timolol, 1 drop, Both Eyes, Daily      Continuous medications     PRN medications  PRN medications: fluticasone, oxygen, prochlorperazine         Assessment/Plan      Qi Davila is a 82 y.o. female with a past medical history of headache,  depression, anxiety, suicidal ideation, arthritis, GERD, glaucoma, thyroid disorder, obstructive sleep apnea and chronic hyponatremia who presented with headache and suicidal ideation on 10/2.  She was found to be COVID-positive.  She has required supplemental O2.  Was seen by infectious disease and placed on remdesivir.  She was found to have a serum sodium of 124 mmol/a liter with normal renal function.  Review of her labs shows chronic hyponatremia.  She is on an SSRI.  Prior hyponatremia work-up is noted with a low uric acid of 2.2, TSH is normal, no cortisol albeit blood pressures are generous.  Her serum osmolality is 259, urine osmolality of 450 with a spot urine sodium of 18.  She remains in COVID isolation.  Nephrology is consulted for renal care.     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  Her sodium this admission remained in the low 120s. She was asymptomatic. I gave her a dose of tolvaptan 15 mg. Her sodium is trending above 130. I will repeat an RFP this afternoon to ensure that she does not overcorrect. No fluid restriction at the moment but she will need to restrict her liquids when discharged. Otherwise,  she has taken Zoloft for greater than 10 years and had suicidal ideation when she presented thus we will not change this medication. Trend her RFP, will follow with her care.        Principal Problem:    COVID-19  Active Problems:    Anxiety    Pancreatic lesion            I spent 35 minutes in the professional and overall care of this patient.      Richie Fernandez, DO

## 2023-10-11 NOTE — PROGRESS NOTES
Physical Therapy    Physical Therapy Treatment    Patient Name: Qi Davila  MRN: [id number]  Today's Date: 10/11/2023  Time Calculation  Start Time: 1412  Stop Time: 1450  Time Calculation (min): 38 min       Assessment/Plan   PT Assessment  PT Assessment Results: Decreased strength, Decreased endurance, Impaired balance, Decreased mobility, Decreased cognition, Impaired judgement, Decreased safety awareness  Rehab Prognosis: Fair  Evaluation/Treatment Tolerance: Patient tolerated treatment well, Patient limited by fatigue  Medical Staff Made Aware: Yes  End of Session Communication: Bedside nurse, Care Coordinator, Physician  Assessment Comment: PT tx complete. Pt was COVID+, restrictions lifted as of this date. Moving fairly well other than endurance and pt reported RODRIGUEZ /c ambulation in hallway. Pt willing to participate and wants to be up and moving. Needing continued PT for addressing functional deficits.  End of Session Patient Position: Bed, 3 rail up, Alarm on     PT Plan  Treatment/Interventions: Bed mobility, Transfer training, Gait training, Stair training, Balance training, Neuromuscular re-education, Strengthening, Endurance training, Therapeutic exercise, Therapeutic activity, Home exercise program, Positioning  PT Plan: Skilled PT  PT Frequency: 3 times per week  PT Discharge Recommendations: Low intensity level of continued care, 24 hr supervision due to cognition  Equipment Recommended upon Discharge: Wheeled walker  PT Recommended Transfer Status: Stand by assist      General Visit Information:   PT  Visit  PT Received On: 10/11/23  Response to Previous Treatment: Patient reporting fatigue but able to participate.  General  Reason for Referral: Weakness due to COVID+  Referred By: Rajendra  Past Medical History Relevant to Rehab: Pt is an 82 year old female presenting with abdominal pain, cough, fatigue, anxiety, and reported suicidal ideation. Pt seen in ED and did test positive for COVID. She has  never been vaccinated.  Pt started on decadron and admitted. Did get dose of remdesivir in the ED. Hx of HTN, HLD, urinary retention, anxiety/depression.  Missed Visit: No  Prior to Session Communication: Bedside nurse  Patient Position Received: Bed, 3 rail up, Alarm on  General Comment: Pt cleared by nursing and pt agreeable to PT eval    Subjective   Precautions:  Precautions  Medical Precautions: Fall precautions (Cleared from COVID precautions this date)  Precautions Comment: Mild confusion  Vital Signs:       Objective   Pain:  Pain Assessment  Pain Assessment: 0-10  Pain Score: 0 - No pain  Cognition:  Cognition  Overall Cognitive Status:  (Much improved cognition)  Orientation Level: Oriented X4  Postural Control:  Postural Control  Postural Control: Within Functional Limits  Extremity/Trunk Assessments:  RUE   RUE : Within Functional Limits  LUE   LUE: Within Functional Limits  RLE   RLE : Within Functional Limits  LLE   LLE : Within Functional Limits  Activity Tolerance:  Activity Tolerance  Endurance: Tolerates 10 - 20 min exercise with multiple rests  Activity Tolerance Comments:  (Seated rest breaks x3)  Treatments:                 Bed Mobility  Bed Mobility: Yes  Bed Mobility 1  Bed Mobility 1: Supine to sitting  Level of Assistance 1: Close supervision  Bed Mobility Comments 1: No hands on assist, increased time, reports fatigue  Bed Mobility 2  Bed Mobility  2: Sitting to supine  Level of Assistance 2: Close supervision  Bed Mobility Comments 2: More fluid movement, 1 trial for success, no hands on assist.    Ambulation/Gait Training  Ambulation/Gait Training Performed: Yes  Ambulation/Gait Training 1  Surface 1: Level tile  Device 1: Rolling walker  Assistance 1: Close supervision  Quality of Gait 1: Diminished heel strike (Wider SHAUNA, decreased foot clearance, slowed herberth, no LOB, cues for upright gaze. Pt commenting on RODRIGUEZ, needing seated rest break prior to return to room.)  Comments/Distance  (ft) 1: 75' x2  Transfers  Transfer: Yes  Transfer 1  Technique 1: Sit to stand  Transfer Device 1: Walker  Transfer Level of Assistance 1: Close supervision  Trials/Comments 1: x2 trials (Bed height elevated, no true hands on assist, increased time and trunk flexion. No LOB. PT managing rolling chair for stabilization when performing sit<> hallway.)  Transfers 2  Technique 2: Stand to sit  Transfer Device 2: Walker  Transfer Level of Assistance 2: Close supervision  Trials/Comments 2: trial x2 (Cues for hand placement, sequencing, safety, and eccentric control. no hands on assist)    Stairs  Stairs: No    Outcome Measures:  [facility] Basic Mobility  Turning from your back to your side while in a flat bed without using bedrails: None  Moving from lying on your back to sitting on the side of a flat bed without using bedrails: None  Moving to and from bed to chair (including a wheelchair): A little  Standing up from a chair using your arms (e.g. wheelchair or bedside chair): A little  To walk in hospital room: A little  Climbing 3-5 steps with railing: A lot  Basic Mobility - Total Score: 19    Education Documentation  Precautions, taught by Luis A Fuller, PT at 10/11/2023  3:19 PM.  Learner: Patient  Readiness: Eager  Method: Explanation  Response: Verbalizes Understanding    Mobility Training, taught by Luis A Fuller, PT at 10/11/2023  3:19 PM.  Learner: Patient  Readiness: Eager  Method: Explanation  Response: Verbalizes Understanding    Education Comments  No comments found.        OP EDUCATION:       Encounter Problems       Encounter Problems (Active)       Balance       STG - Maintains dynamic standing balance with upper extremity support (Progressing)       Start:  10/09/23    Expected End:  10/23/23       INTERVENTIONS:  1. Practice standing with minimal support.  2. Educate patient about standing tolerance.  3. Educate patient about independence with gait, transfers, and ADL's.  4. Educate  patient about use of assistive device.  5. Educate patient about self-directed care.            Mobility       STG - Patient will ambulate (Progressing)       Start:  10/09/23    Expected End:  10/23/23       >/= 75', device PRN, MOD I, no LOB               Pain - Adult          Safety       Goal 1 (Progressing)       Start:  10/09/23    Expected End:  10/23/23       Pt will verbalize and apply safety awareness and precautions 100% of time throughout entire session               Transfers       STG - Patient will perform bed mobility (Progressing)       Start:  10/09/23    Expected End:  10/23/23       At MOD I level, safely, no LOB         STG - Patient will transfer sit to and from stand (Progressing)       Start:  10/09/23    Expected End:  10/23/23       At MOD I level, safely, no LOB

## 2023-10-11 NOTE — PROGRESS NOTES
INPATIENT PROGRESS NOTES    PRIMARY SERVICE: Angel Drew MD         10/11/2023  8 26a    INTERVAL HPI: Pt feels OK  Upset she did not sleep  Upset her phone rang several times over night  Upset she was awoke by staff over night for rounds    Upset that I did not do anything about this issues    Tolerating PO  Sodium improving       PERTINENT ROS:  REVIEW OF SYSTEMS  GENERAL: negative for fever, SEE HPI  RESPIRATORY: Negative for cough, wheezing or shortness of breath.  CARDIOVASCULAR: Negative for chest pain, leg swelling or palpitations.  GI: Negative for abdominal discomfort, blood in stools or black stools or change in bowel habits  NEURO: no change per nursing  All other reviewed and negative other than HPI.      MEDICATIONS:    No current facility-administered medications on file prior to encounter.     Current Outpatient Medications on File Prior to Encounter   Medication Sig Dispense Refill    busPIRone (Buspar) 5 mg tablet Take 1 tablet (5 mg) by mouth 2 times a day.      clobetasol (Temovate) 0.05 % external solution Apply 1 Application topically 2 times a day.      clotrimazole-betamethasone (Lotrisone) cream Apply 1 Application topically 2 times a day.      famciclovir (Famvir) 500 mg tablet Take 1 tablet (500 mg) by mouth 1 time if needed. With oubreak      latanoprost (Xalatan) 0.005 % ophthalmic solution Administer 1 drop into both eyes once daily at bedtime.      levothyroxine (Synthroid, Levoxyl) 25 mcg tablet Take 1 tablet (25 mcg) by mouth once daily.      propranolol (Inderal) 20 mg tablet Take 1 tablet (20 mg) by mouth 2 times a day.      Restasis 0.05 % ophthalmic emulsion Administer 1 drop into both eyes every 12 hours.      sertraline (Zoloft) 50 mg tablet Take 2 tablets (100 mg) by mouth once daily.      Alphagan P 0.1 % ophthalmic solution Administer 1 drop into both eyes every 12 hours.      ciclopirox 1 % shampoo Apply 1 Application topically once daily at bedtime.      dexlansoprazole  (Dexilant) 30 mg DR capsule Take 1 capsule (30 mg) by mouth once daily.      fish oil concentrate (Omega-3) 120-180 mg capsule Take 1 capsule (1 g) by mouth once daily.      fluticasone (Flonase) 50 mcg/actuation nasal spray Administer 1 spray into each nostril once daily.      QUEtiapine (SEROquel) 50 mg tablet Take 1 tablet (50 mg) by mouth once daily at bedtime.      timolol (Timoptic) 0.5 % ophthalmic solution Administer 1 drop into both eyes once daily.           PHYSICAL EXAM:   Vitals:    10/10/23 2100 10/10/23 2345 10/10/23 2355 10/11/23 0420   BP: 133/68  136/62 130/62   BP Location: Right arm  Right arm Right arm   Patient Position: Lying  Lying Lying   Pulse: 60  60 52   Resp: 18 16 16 16   Temp: 36.9 °C (98.4 °F)  36.4 °C (97.5 °F) 36 °C (96.8 °F)   TempSrc: Oral  Temporal Temporal   SpO2: 96%  98% 99%   Weight:       Height:            PHYSICAL EXAMINATION:    General appearance: thin frail elderly   Skin: skin color, texture, turgor normal,   HEENT: Anicteric sclera.  Oropharynx mucosa moist  Neck: Supple, no adenopathy;   Back: no pain to palpation over spine or costovertebral angles,   Lungs: clear to auscultation, no wheezing or rhonchi  Heart: RRR without murmur, gallop, or rubs.   Abdomen: Abdomen soft, non-tender. Bowel sounds normal. No masses, organomegaly   does have chr de la garza   Extremities: Extremities normal. No  edema, or skin discoloration.   Musculoskeletal: Spine range of motion normal. Muscular strength intact  Neuro: Oriented X  3    DATA: CBC, Coags, BMP, Mg, Phos   Results for orders placed or performed during the hospital encounter of 10/02/23 (from the past 96 hour(s))   Uric Acid   Result Value Ref Range    Uric Acid 2.2 (L) 2.3 - 6.7 mg/dL   Osmolality   Result Value Ref Range    Osmolality, Serum 259 (L) 280 - 300 mOsm/kg   TSH with reflex to Free T4 if abnormal   Result Value Ref Range    Thyroid Stimulating Hormone 1.74 0.44 - 3.98 mIU/L   Basic metabolic panel   Result  Value Ref Range    Glucose 117 (H) 74 - 99 mg/dL    Sodium 121 (L) 136 - 145 mmol/L    Potassium 3.3 (L) 3.5 - 5.3 mmol/L    Chloride 90 (L) 98 - 107 mmol/L    Bicarbonate 26 21 - 32 mmol/L    Anion Gap 8 (L) 10 - 20 mmol/L    Urea Nitrogen 13 6 - 23 mg/dL    Creatinine 0.41 (L) 0.50 - 1.05 mg/dL    eGFR >90 >60 mL/min/1.73m*2    Calcium 7.3 (L) 8.6 - 10.3 mg/dL   Drug Screen, Urine With Reflex to Confirmation   Result Value Ref Range    Amphetamine Screen, Urine Presumptive Negative Presumptive Negative    Barbiturate Screen, Urine Presumptive Negative Presumptive Negative    Benzodiazepines Screen, Urine Presumptive Negative Presumptive Negative    Cannabinoid Screen, Urine Presumptive Negative Presumptive Negative    Cocaine Metabolite Screen, Urine Presumptive Negative Presumptive Negative    Fentanyl Screen, Urine Presumptive Negative Presumptive Negative    Opiate Screen, Urine Presumptive Negative Presumptive Negative    Oxycodone Screen, Urine Presumptive Negative Presumptive Negative    PCP Screen, Urine Presumptive Negative Presumptive Negative   Sodium, Urine Random   Result Value Ref Range    Sodium, Urine Random 18 mmol/L    Creatinine, Urine Random 67.9 20.0 - 320.0 mg/dL    Sodium/Creatinine Ratio 27 Not established. mmol/g Creat   Osmolality, Urine   Result Value Ref Range    Osmolality, Urine Random 450 200 - 1,200 mOsm/kg   Urinalysis with Reflex Microscopic   Result Value Ref Range    Color, Urine Yellow Straw, Yellow    Appearance, Urine Cloudy (N) Clear    Specific Gravity, Urine 1.015 1.005 - 1.035    pH, Urine 7.0 5.0, 5.5, 6.0, 6.5, 7.0, 7.5, 8.0    Protein, Urine 100 (2+) (N) NEGATIVE mg/dL    Glucose, Urine NEGATIVE NEGATIVE mg/dL    Blood, Urine MODERATE (2+) (A) NEGATIVE    Ketones, Urine NEGATIVE NEGATIVE mg/dL    Bilirubin, Urine NEGATIVE NEGATIVE    Urobilinogen, Urine 4.0 (N) <2.0 mg/dL    Nitrite, Urine NEGATIVE NEGATIVE    Leukocyte Esterase, Urine LARGE (3+) (A) NEGATIVE    Urinalysis Microscopic Only   Result Value Ref Range    WBC, Urine >50 (A) 1-5, NONE /HPF    RBC, Urine 11-20 (A) NONE, 1-2, 3-5 /HPF    Bacteria, Urine 3+ (A) NONE SEEN /HPF    Mucus, Urine 1+ Reference range not established. /LPF    Triple Phosphate Crystals, Urine 1+ NONE, 1+ /HPF    Amorphous Crystals, Urine 1+ NONE, 1+, 2+ /HPF   CBC   Result Value Ref Range    WBC 7.0 4.4 - 11.3 x10*3/uL    nRBC 0.0 0.0 - 0.0 /100 WBCs    RBC 3.87 (L) 4.00 - 5.20 x10*6/uL    Hemoglobin 12.5 12.0 - 16.0 g/dL    Hematocrit 34.9 (L) 36.0 - 46.0 %    MCV 90 80 - 100 fL    MCH 32.3 26.0 - 34.0 pg    MCHC 35.8 32.0 - 36.0 g/dL    RDW 13.9 11.5 - 14.5 %    Platelets 238 150 - 450 x10*3/uL    MPV 10.8 7.5 - 11.5 fL   POCT GLUCOSE   Result Value Ref Range    POCT Glucose 130 (H) 74 - 99 mg/dL   CBC   Result Value Ref Range    WBC 6.6 4.4 - 11.3 x10*3/uL    nRBC 0.0 0.0 - 0.0 /100 WBCs    RBC 3.90 (L) 4.00 - 5.20 x10*6/uL    Hemoglobin 12.5 12.0 - 16.0 g/dL    Hematocrit 35.0 (L) 36.0 - 46.0 %    MCV 90 80 - 100 fL    MCH 32.1 26.0 - 34.0 pg    MCHC 35.7 32.0 - 36.0 g/dL    RDW 13.9 11.5 - 14.5 %    Platelets 259 150 - 450 x10*3/uL    MPV 10.8 7.5 - 11.5 fL   Basic metabolic panel   Result Value Ref Range    Glucose 105 (H) 74 - 99 mg/dL    Sodium 124 (L) 136 - 145 mmol/L    Potassium 3.7 3.5 - 5.3 mmol/L    Chloride 93 (L) 98 - 107 mmol/L    Bicarbonate 26 21 - 32 mmol/L    Anion Gap 9 (L) 10 - 20 mmol/L    Urea Nitrogen 13 6 - 23 mg/dL    Creatinine 0.52 0.50 - 1.05 mg/dL    eGFR >90 >60 mL/min/1.73m*2    Calcium 7.9 (L) 8.6 - 10.3 mg/dL   CBC   Result Value Ref Range    WBC 6.8 4.4 - 11.3 x10*3/uL    nRBC 0.0 0.0 - 0.0 /100 WBCs    RBC 3.87 (L) 4.00 - 5.20 x10*6/uL    Hemoglobin 12.1 12.0 - 16.0 g/dL    Hematocrit 34.8 (L) 36.0 - 46.0 %    MCV 90 80 - 100 fL    MCH 31.3 26.0 - 34.0 pg    MCHC 34.8 32.0 - 36.0 g/dL    RDW 14.1 11.5 - 14.5 %    Platelets 262 150 - 450 x10*3/uL    MPV 10.4 7.5 - 11.5 fL   Basic metabolic panel  "  Result Value Ref Range    Glucose 103 (H) 74 - 99 mg/dL    Sodium 124 (L) 136 - 145 mmol/L    Potassium 3.8 3.5 - 5.3 mmol/L    Chloride 93 (L) 98 - 107 mmol/L    Bicarbonate 27 21 - 32 mmol/L    Anion Gap 8 (L) 10 - 20 mmol/L    Urea Nitrogen 12 6 - 23 mg/dL    Creatinine 0.45 (L) 0.50 - 1.05 mg/dL    eGFR >90 >60 mL/min/1.73m*2    Calcium 8.1 (L) 8.6 - 10.3 mg/dL   Basic metabolic panel   Result Value Ref Range    Glucose 108 (H) 74 - 99 mg/dL    Sodium 132 (L) 136 - 145 mmol/L    Potassium 3.7 3.5 - 5.3 mmol/L    Chloride 101 98 - 107 mmol/L    Bicarbonate 25 21 - 32 mmol/L    Anion Gap 10 10 - 20 mmol/L    Urea Nitrogen 13 6 - 23 mg/dL    Creatinine 0.49 (L) 0.50 - 1.05 mg/dL    eGFR >90 >60 mL/min/1.73m*2    Calcium 8.2 (L) 8.6 - 10.3 mg/dL           ASSESSMENT AND PLAN:     Principal Problem:    COVID-19  Active Problems:    Anxiety    Pancreatic lesion      #COVID-pneumonia  Completed 5 days of remdesivir  Denies any shortness of breath  May come out of isolation if OK with infection control nurse  ID signed off       #Depression with anxiety  Continue home medications  Can follow up w psyche as out patient      #Hyponatremia  Improving  Renal on board      #Hypokalemia     #Chronic De La Garza catheter  Exchange De La Garza catheter >> this was done yesterday, pt upset she \"had to have it done\"   Continue Flomax    #UTI (CAUTI)  C/wIV antibiotics  Urine culture  pending >. Not clear if ever sent   Cont with rocephin  Needs de la garza changed, last done 09/11/23 >> now done 10/9/23  Needs to be changed every 4 weeks      Hyponatremmia persists  Renal consulted D/w DrGaines     PTOT    Plan to dc when ok w renal  Poss today or in am      Plan of care discussed with: Provider, RN, Patient.      Patient case and plan of care discussed with Dr. JESSICA Drew.    Jose Carlisle, TREASURE - CNP  -In collaboration with Dr. JESSICA Drew    [facility]  Office: [phone]  Fax: [phone]  I have " reviewed the above note obtained and documented by the NP/PA and I personally participated in the key components. I have discussed the case and management of the patient's care. Changes made to the note, and all key components of history and physical/progress note done by me.  dw nursing  Dc plan snf  Angel Drew MD

## 2023-10-11 NOTE — NURSING NOTE
Dr. Bowling cleared patient to no longer need suicide precautions and states it is okay to remove precautions from patient header so orders have been discontinued per provider request.

## 2023-10-11 NOTE — CARE PLAN
The patient's goals for the shift include Pt. will have a safe, restful and uneventful evening    The clinical goals for the shift include Pt. will tolerate CPAP throughout the night    Problem: Nutrition  Goal: Oral intake greater than 50%  10/11/2023 1921 by Rebeca Orantes RN  Outcome: Progressing  10/11/2023 0554 by Rebeca Orantes RN  Outcome: Progressing  Goal: Oral intake greater 75%  10/11/2023 1921 by Rebeca Orantes RN  Outcome: Progressing  10/11/2023 0554 by Rebeca Orantes RN  Outcome: Progressing  Goal: Consume prescribed supplement  10/11/2023 1921 by Rebeca Orantes RN  Outcome: Progressing  10/11/2023 0554 by Rebeca Orantes RN  Outcome: Progressing  Goal: Adequate PO fluid intake  10/11/2023 1921 by Rebeca Orantes RN  Outcome: Progressing  10/11/2023 0554 by Rebeca Orantes RN  Outcome: Progressing  Goal: Nutrition support goals are met within 48 hrs  10/11/2023 1921 by Rebeca Orantes RN  Outcome: Progressing  10/11/2023 0554 by Rebeca Orantes RN  Outcome: Progressing  Goal: Nutrition support is meeting 75% of nutrient needs  10/11/2023 1921 by Rebeca Orantes RN  Outcome: Progressing  10/11/2023 0554 by Rebeca Orantes RN  Outcome: Progressing  Goal: BG  mg/dL  10/11/2023 1921 by Rebeca Orantes RN  Outcome: Progressing  10/11/2023 0554 by Rebeca Orantes RN  Outcome: Progressing  Goal: Lab values WNL  10/11/2023 1921 by Rebeca Orantes RN  Outcome: Progressing  10/11/2023 0554 by Rebeca Orantes RN  Outcome: Progressing  Goal: Electrolytes WNL  10/11/2023 1921 by Rebeca Orantes RN  Outcome: Progressing  10/11/2023 0554 by Rebeca Orantes RN  Outcome: Progressing  Goal: Promote healing  10/11/2023 1921 by Rebeca Orantes RN  Outcome: Progressing  10/11/2023 0554 by Rebeca Orantes RN  Outcome: Progressing  Goal: Maintain stable weight  10/11/2023 1921 by Rebeca Orantes RN  Outcome: Progressing  10/11/2023 0554 by Rebeca Orantes RN  Outcome: Progressing  Goal:  Reduce weight from edema/fluid  10/11/2023 1921 by Rebeca Orantes RN  Outcome: Progressing  10/11/2023 0554 by Rebeca Orantes RN  Outcome: Progressing  Goal: Gradual weight gain  10/11/2023 1921 by Rebeca Orantes RN  Outcome: Progressing  10/11/2023 0554 by Rebeca Orantes RN  Outcome: Progressing  Goal: Improve ostomy output  10/11/2023 1921 by Reebca Orantes RN  Outcome: Progressing  10/11/2023 0554 by Rebeca Orantes RN  Outcome: Progressing     Problem: Chronic Conditions and Co-morbidities  Goal: Patient's chronic conditions and co-morbidity symptoms are monitored and maintained or improved  10/11/2023 1921 by Rebeca Orantes RN  Outcome: Progressing  10/11/2023 0554 by Rebeca Orantes RN  Outcome: Progressing     Problem: Safety - Adult  Goal: Free from fall injury  10/11/2023 1921 by Rebeca Orantes RN  Outcome: Progressing  10/11/2023 0554 by Rebeca Orantes RN  Outcome: Progressing     Problem: Pain - Adult  Goal: Verbalizes/displays adequate comfort level or baseline comfort level  10/11/2023 1921 by Rebeca Orantes RN  Outcome: Progressing  10/11/2023 0554 by Rebeca Orantes RN  Outcome: Progressing     Problem: Pain  Goal: Takes deep breaths with improved pain control throughout the shift  10/11/2023 1921 by Rebeca Orantes RN  Outcome: Progressing  10/11/2023 0554 by Rebeca Orantes RN  Outcome: Progressing  Goal: Turns in bed with improved pain control throughout the shift  10/11/2023 1921 by Rebeca Orantes RN  Outcome: Progressing  10/11/2023 0554 by Rebeca Orantes RN  Outcome: Progressing  Goal: Walks with improved pain control throughout the shift  10/11/2023 1921 by Rebeca Orantes RN  Outcome: Progressing  10/11/2023 0554 by Rebeca Orantes RN  Outcome: Progressing  Goal: Performs ADL's with improved pain control throughout shift  10/11/2023 1921 by Rebeca Orantes RN  Outcome: Progressing  10/11/2023 0554 by Rebeca Orantes RN  Outcome: Progressing  Goal: Participates in  PT with improved pain control throughout the shift  10/11/2023 1921 by Rebeca Orantes RN  Outcome: Progressing  10/11/2023 0554 by Rebeca Orantes RN  Outcome: Progressing  Goal: Free from opioid side effects throughout the shift  10/11/2023 1921 by Rebeca Orantes RN  Outcome: Progressing  10/11/2023 0554 by Rebeca Orantes RN  Outcome: Progressing  Goal: Free from acute confusion related to pain meds throughout the shift  10/11/2023 1921 by Rebeca Orantes RN  Outcome: Progressing  10/11/2023 0554 by Rebeca Orantes RN  Outcome: Progressing

## 2023-10-11 NOTE — CARE PLAN
The patient's goals for the shift include Pt. will have a safe, restful and uneventful evening by end of shift    The clinical goals for the shift include Pt. will tolerate CPAP throughout the night    Problem: Chronic Conditions and Co-morbidities  Goal: Patient's chronic conditions and co-morbidity symptoms are monitored and maintained or improved  Outcome: Progressing     Problem: Safety - Adult  Goal: Free from fall injury  Outcome: Progressing     Problem: Pain - Adult  Goal: Verbalizes/displays adequate comfort level or baseline comfort level  Outcome: Progressing

## 2023-10-12 LAB
ANION GAP SERPL CALC-SCNC: 9 MMOL/L (ref 10–20)
BACTERIA UR CULT: NORMAL
BUN SERPL-MCNC: 12 MG/DL (ref 6–23)
CALCIUM SERPL-MCNC: 8 MG/DL (ref 8.6–10.3)
CHLORIDE SERPL-SCNC: 102 MMOL/L (ref 98–107)
CO2 SERPL-SCNC: 25 MMOL/L (ref 21–32)
CREAT SERPL-MCNC: 0.54 MG/DL (ref 0.5–1.05)
GFR SERPL CREATININE-BSD FRML MDRD: >90 ML/MIN/1.73M*2
GLUCOSE BLD MANUAL STRIP-MCNC: 122 MG/DL (ref 74–99)
GLUCOSE SERPL-MCNC: 92 MG/DL (ref 74–99)
POTASSIUM SERPL-SCNC: 3.8 MMOL/L (ref 3.5–5.3)
SODIUM SERPL-SCNC: 132 MMOL/L (ref 136–145)

## 2023-10-12 PROCEDURE — 96372 THER/PROPH/DIAG INJ SC/IM: CPT | Performed by: FAMILY MEDICINE

## 2023-10-12 PROCEDURE — 2500000001 HC RX 250 WO HCPCS SELF ADMINISTERED DRUGS (ALT 637 FOR MEDICARE OP): Performed by: PHYSICIAN ASSISTANT

## 2023-10-12 PROCEDURE — 1100000001 HC PRIVATE ROOM DAILY

## 2023-10-12 PROCEDURE — 2500000004 HC RX 250 GENERAL PHARMACY W/ HCPCS (ALT 636 FOR OP/ED): Performed by: INTERNAL MEDICINE

## 2023-10-12 PROCEDURE — 2500000004 HC RX 250 GENERAL PHARMACY W/ HCPCS (ALT 636 FOR OP/ED): Performed by: PSYCHIATRY & NEUROLOGY

## 2023-10-12 PROCEDURE — 80048 BASIC METABOLIC PNL TOTAL CA: CPT | Performed by: FAMILY MEDICINE

## 2023-10-12 PROCEDURE — 2500000004 HC RX 250 GENERAL PHARMACY W/ HCPCS (ALT 636 FOR OP/ED): Performed by: NURSE PRACTITIONER

## 2023-10-12 PROCEDURE — 36415 COLL VENOUS BLD VENIPUNCTURE: CPT | Performed by: FAMILY MEDICINE

## 2023-10-12 PROCEDURE — 82947 ASSAY GLUCOSE BLOOD QUANT: CPT

## 2023-10-12 PROCEDURE — 2500000001 HC RX 250 WO HCPCS SELF ADMINISTERED DRUGS (ALT 637 FOR MEDICARE OP): Performed by: FAMILY MEDICINE

## 2023-10-12 PROCEDURE — 2500000004 HC RX 250 GENERAL PHARMACY W/ HCPCS (ALT 636 FOR OP/ED): Performed by: PHYSICIAN ASSISTANT

## 2023-10-12 PROCEDURE — 2500000004 HC RX 250 GENERAL PHARMACY W/ HCPCS (ALT 636 FOR OP/ED): Performed by: FAMILY MEDICINE

## 2023-10-12 RX ORDER — ENOXAPARIN SODIUM 100 MG/ML
40 INJECTION SUBCUTANEOUS DAILY
Qty: 7 EACH | Refills: 0 | Status: CANCELLED
Start: 2023-10-12 | End: 2023-10-19

## 2023-10-12 RX ORDER — TAMSULOSIN HYDROCHLORIDE 0.4 MG/1
0.4 CAPSULE ORAL NIGHTLY
Status: CANCELLED
Start: 2023-10-12

## 2023-10-12 RX ADMIN — POLYETHYLENE GLYCOL (3350) 17 G: 17 POWDER, FOR SOLUTION ORAL at 08:56

## 2023-10-12 RX ADMIN — CLOTRIMAZOLE AND BETAMETHASONE DIPROPIONATE 1 APPLICATION: 10; .64 CREAM TOPICAL at 08:57

## 2023-10-12 RX ADMIN — BRIMONIDINE TARTRATE 1 DROP: 2 SOLUTION/ DROPS OPHTHALMIC at 17:51

## 2023-10-12 RX ADMIN — QUETIAPINE FUMARATE 25 MG: 25 TABLET ORAL at 18:37

## 2023-10-12 RX ADMIN — TIMOLOL MALEATE 1 DROP: 5 SOLUTION/ DROPS OPHTHALMIC at 08:58

## 2023-10-12 RX ADMIN — PROPRANOLOL HYDROCHLORIDE 20 MG: 10 TABLET ORAL at 21:04

## 2023-10-12 RX ADMIN — PROCHLORPERAZINE EDISYLATE 10 MG: 5 INJECTION INTRAMUSCULAR; INTRAVENOUS at 15:48

## 2023-10-12 RX ADMIN — BRIMONIDINE TARTRATE 1 DROP: 2 SOLUTION/ DROPS OPHTHALMIC at 05:51

## 2023-10-12 RX ADMIN — Medication 3 MG: at 21:04

## 2023-10-12 RX ADMIN — PANTOPRAZOLE SODIUM 20 MG: 20 TABLET, DELAYED RELEASE ORAL at 08:03

## 2023-10-12 RX ADMIN — TAMSULOSIN HYDROCHLORIDE 0.4 MG: 0.4 CAPSULE ORAL at 21:04

## 2023-10-12 RX ADMIN — LEVOTHYROXINE SODIUM 25 MCG: 0.03 TABLET ORAL at 08:03

## 2023-10-12 RX ADMIN — ENOXAPARIN SODIUM 40 MG: 40 INJECTION SUBCUTANEOUS at 08:56

## 2023-10-12 RX ADMIN — PROPRANOLOL HYDROCHLORIDE 20 MG: 10 TABLET ORAL at 08:57

## 2023-10-12 RX ADMIN — CEFTRIAXONE SODIUM 1 G: 1 INJECTION, SOLUTION INTRAVENOUS at 12:38

## 2023-10-12 RX ADMIN — LATANOPROST 1 DROP: 50 SOLUTION OPHTHALMIC at 21:03

## 2023-10-12 RX ADMIN — SERTRALINE HYDROCHLORIDE 50 MG: 50 TABLET ORAL at 08:57

## 2023-10-12 ASSESSMENT — PAIN - FUNCTIONAL ASSESSMENT
PAIN_FUNCTIONAL_ASSESSMENT: 0-10

## 2023-10-12 ASSESSMENT — PAIN SCALES - GENERAL
PAINLEVEL_OUTOF10: 0 - NO PAIN

## 2023-10-12 ASSESSMENT — COGNITIVE AND FUNCTIONAL STATUS - GENERAL
DRESSING REGULAR LOWER BODY CLOTHING: A LITTLE
PERSONAL GROOMING: A LITTLE
HELP NEEDED FOR BATHING: A LITTLE
WALKING IN HOSPITAL ROOM: A LITTLE
STANDING UP FROM CHAIR USING ARMS: A LITTLE
DAILY ACTIVITIY SCORE: 19
TOILETING: A LITTLE
CLIMB 3 TO 5 STEPS WITH RAILING: A LOT
DRESSING REGULAR UPPER BODY CLOTHING: A LITTLE
MOBILITY SCORE: 19
MOVING TO AND FROM BED TO CHAIR: A LITTLE

## 2023-10-12 NOTE — CARE PLAN
The patient's goals for the shift include Pt. will have a safe, restful and uneventful evening    The clinical goals for the shift include Pt. will tolerate CPAP throughout the night    O

## 2023-10-12 NOTE — PROGRESS NOTES
Care Coordinator Note:  Qi Davila is a 82 y.o. female on day 10 of admission presenting with COVID-19.  Requested auth be escalated by [facility] team.  Patient will discharge to [facility] when insurance auth is obtained.  Anya Hall RN TCC

## 2023-10-12 NOTE — CARE PLAN
Problem: Fall/Injury  Goal: Not fall by end of shift  10/12/2023 1117 by Steve Caal RN  Outcome: Progressing  10/12/2023 1116 by Steve Caal RN  Outcome: Progressing   The patient's goals for the shift include Pt. will have a safe, restful and uneventful evening    The clinical goals for the shift include Pt. will tolerate CPAP throughout the night    O  Problem: Fall/Injury  Goal: Be free from injury by end of the shift  10/12/2023 1117 by Steve Caal RN  Outcome: Progressing  10/12/2023 1116 by Steve Caal RN  Outcome: Progressing     Problem: Fall/Injury  Goal: Verbalize understanding of personal risk factors for fall in the hospital  10/12/2023 1117 by Steve Caal RN  Outcome: Progressing  10/12/2023 1116 by Steve Caal RN  Outcome: Progressing     Problem: Fall/Injury  Goal: Verbalize understanding of risk factor reduction measures to prevent injury from fall in the home  10/12/2023 1117 by Steve Caal RN  Outcome: Progressing  10/12/2023 1116 by Steve Caal RN  Outcome: Progressing

## 2023-10-12 NOTE — PROGRESS NOTES
INPATIENT PROGRESS NOTES    PRIMARY SERVICE: Angel Drew MD         10/13/2023  9 36am    INTERVAL HPI: Pt feels OK    No new complaints     PERTINENT ROS:  REVIEW OF SYSTEMS  GENERAL: negative for fever, SEE HPI  RESPIRATORY: Negative for cough, wheezing or shortness of breath.  CARDIOVASCULAR: Negative for chest pain, leg swelling or palpitations.  GI: Negative for abdominal discomfort, blood in stools or black stools or change in bowel habits  NEURO: no change per nursing  All other reviewed and negative other than HPI.      MEDICATIONS:    No current facility-administered medications on file prior to encounter.     Current Outpatient Medications on File Prior to Encounter   Medication Sig Dispense Refill    busPIRone (Buspar) 5 mg tablet Take 1 tablet (5 mg) by mouth 2 times a day.      clobetasol (Temovate) 0.05 % external solution Apply 1 Application topically 2 times a day.      clotrimazole-betamethasone (Lotrisone) cream Apply 1 Application topically 2 times a day.      famciclovir (Famvir) 500 mg tablet Take 1 tablet (500 mg) by mouth 1 time if needed. With oubreak      latanoprost (Xalatan) 0.005 % ophthalmic solution Administer 1 drop into both eyes once daily at bedtime.      levothyroxine (Synthroid, Levoxyl) 25 mcg tablet Take 1 tablet (25 mcg) by mouth once daily.      propranolol (Inderal) 20 mg tablet Take 1 tablet (20 mg) by mouth 2 times a day.      Restasis 0.05 % ophthalmic emulsion Administer 1 drop into both eyes every 12 hours.      sertraline (Zoloft) 50 mg tablet Take 2 tablets (100 mg) by mouth once daily.      Alphagan P 0.1 % ophthalmic solution Administer 1 drop into both eyes every 12 hours.      ciclopirox 1 % shampoo Apply 1 Application topically once daily at bedtime.      dexlansoprazole (Dexilant) 30 mg DR capsule Take 1 capsule (30 mg) by mouth once daily.      fish oil concentrate (Omega-3) 120-180 mg capsule Take 1 capsule (1 g) by mouth once daily.      fluticasone  (Flonase) 50 mcg/actuation nasal spray Administer 1 spray into each nostril once daily.      QUEtiapine (SEROquel) 50 mg tablet Take 1 tablet (50 mg) by mouth once daily at bedtime.      timolol (Timoptic) 0.5 % ophthalmic solution Administer 1 drop into both eyes once daily.           PHYSICAL EXAM:   Vitals:    10/12/23 2340 10/13/23 0100 10/13/23 0318 10/13/23 0740   BP:  164/79 148/78 163/77   BP Location:       Patient Position:       Pulse:  81 61 66   Resp: 18 18 18 20   Temp:  36.4 °C (97.5 °F) 36.6 °C (97.9 °F) 36.7 °C (98.1 °F)   TempSrc:       SpO2:  92% 96% 95%   Weight:       Height:            PHYSICAL EXAMINATION:    General appearance: thin frail elderly   Skin: skin color, texture, turgor normal,   HEENT: Anicteric sclera.  Oropharynx mucosa moist  Neck: Supple, no adenopathy;   Back: no pain to palpation over spine or costovertebral angles,   Lungs: clear to auscultation, no wheezing or rhonchi  Heart: RRR without murmur, gallop, or rubs.   Abdomen: Abdomen soft, non-tender. Bowel sounds normal. No masses, organomegaly   does have chr de la garza   Extremities: Extremities normal. No  edema, or skin discoloration.   Musculoskeletal: Spine range of motion normal. Muscular strength intact  Neuro: Oriented X  3    DATA: CBC, Coags, BMP, Mg, Phos   Results for orders placed or performed during the hospital encounter of 10/02/23 (from the past 96 hour(s))   Basic metabolic panel   Result Value Ref Range    Glucose 105 (H) 74 - 99 mg/dL    Sodium 124 (L) 136 - 145 mmol/L    Potassium 3.7 3.5 - 5.3 mmol/L    Chloride 93 (L) 98 - 107 mmol/L    Bicarbonate 26 21 - 32 mmol/L    Anion Gap 9 (L) 10 - 20 mmol/L    Urea Nitrogen 13 6 - 23 mg/dL    Creatinine 0.52 0.50 - 1.05 mg/dL    eGFR >90 >60 mL/min/1.73m*2    Calcium 7.9 (L) 8.6 - 10.3 mg/dL   CBC   Result Value Ref Range    WBC 6.8 4.4 - 11.3 x10*3/uL    nRBC 0.0 0.0 - 0.0 /100 WBCs    RBC 3.87 (L) 4.00 - 5.20 x10*6/uL    Hemoglobin 12.1 12.0 - 16.0 g/dL     Hematocrit 34.8 (L) 36.0 - 46.0 %    MCV 90 80 - 100 fL    MCH 31.3 26.0 - 34.0 pg    MCHC 34.8 32.0 - 36.0 g/dL    RDW 14.1 11.5 - 14.5 %    Platelets 262 150 - 450 x10*3/uL    MPV 10.4 7.5 - 11.5 fL   Basic metabolic panel   Result Value Ref Range    Glucose 103 (H) 74 - 99 mg/dL    Sodium 124 (L) 136 - 145 mmol/L    Potassium 3.8 3.5 - 5.3 mmol/L    Chloride 93 (L) 98 - 107 mmol/L    Bicarbonate 27 21 - 32 mmol/L    Anion Gap 8 (L) 10 - 20 mmol/L    Urea Nitrogen 12 6 - 23 mg/dL    Creatinine 0.45 (L) 0.50 - 1.05 mg/dL    eGFR >90 >60 mL/min/1.73m*2    Calcium 8.1 (L) 8.6 - 10.3 mg/dL   Basic metabolic panel   Result Value Ref Range    Glucose 108 (H) 74 - 99 mg/dL    Sodium 132 (L) 136 - 145 mmol/L    Potassium 3.7 3.5 - 5.3 mmol/L    Chloride 101 98 - 107 mmol/L    Bicarbonate 25 21 - 32 mmol/L    Anion Gap 10 10 - 20 mmol/L    Urea Nitrogen 13 6 - 23 mg/dL    Creatinine 0.49 (L) 0.50 - 1.05 mg/dL    eGFR >90 >60 mL/min/1.73m*2    Calcium 8.2 (L) 8.6 - 10.3 mg/dL   Urine culture    Specimen: Indwelling (Samson) Catheter; Urine   Result Value Ref Range    Urine Culture No significant growth    Renal function panel   Result Value Ref Range    Glucose 104 (H) 74 - 99 mg/dL    Sodium 131 (L) 136 - 145 mmol/L    Potassium 3.7 3.5 - 5.3 mmol/L    Chloride 98 98 - 107 mmol/L    Bicarbonate 27 21 - 32 mmol/L    Anion Gap 10 10 - 20 mmol/L    Urea Nitrogen 14 6 - 23 mg/dL    Creatinine 0.63 0.50 - 1.05 mg/dL    eGFR 88 >60 mL/min/1.73m*2    Calcium 8.5 (L) 8.6 - 10.3 mg/dL    Phosphorus 3.8 2.5 - 4.9 mg/dL    Albumin 3.5 3.4 - 5.0 g/dL   Basic metabolic panel   Result Value Ref Range    Glucose 92 74 - 99 mg/dL    Sodium 132 (L) 136 - 145 mmol/L    Potassium 3.8 3.5 - 5.3 mmol/L    Chloride 102 98 - 107 mmol/L    Bicarbonate 25 21 - 32 mmol/L    Anion Gap 9 (L) 10 - 20 mmol/L    Urea Nitrogen 12 6 - 23 mg/dL    Creatinine 0.54 0.50 - 1.05 mg/dL    eGFR >90 >60 mL/min/1.73m*2    Calcium 8.0 (L) 8.6 - 10.3 mg/dL   Lavender  "Top   Result Value Ref Range    Extra Tube Hold for add-ons.    POCT GLUCOSE   Result Value Ref Range    POCT Glucose 122 (H) 74 - 99 mg/dL           ASSESSMENT AND PLAN:     Principal Problem:    COVID-19  Active Problems:    Anxiety    Pancreatic lesion      #COVID-pneumonia  Completed 5 days of remdesivir  Denies any shortness of breath  May come out of isolation if OK with infection control nurse  ID signed off       #Depression with anxiety  Continue home medications  Can follow up w psyche as out patient      #Hyponatremia  Improving  Renal on board      #Hypokalemia     #Chronic De La Garza catheter  Exchange De La Garza catheter >> this was done yesterday, pt upset she \"had to have it done\"   Continue Flomax    #UTI (CAUTI)  C/wIV antibiotics  Urine culture  pending >. Not clear if ever sent   Cont with rocephin  Needs de la garza changed, last done 09/11/23 >> now done 10/9/23  Needs to be changed every 4 weeks      Hyponatremmia improving   Input from renal noted    PTOT      Dc planning   Plan of care discussed with: Provider, RN, Patient.      Patient case and plan of care discussed with Dr. JESSIAC Drew.    Jose Carlisle, TREASURE - CNP  -In collaboration with Dr. JESSICA Drew    [facility]  Office: [phone]  Fax: [phone]  I have reviewed the above note obtained and documented by the NP/PA and I personally participated in the key components. I have discussed the case and management of the patient's care. Changes made to the note, and all key components of history and physical/progress note done by me.  dw nursing  Angel Drew MD    "

## 2023-10-12 NOTE — PROGRESS NOTES
Qi Davila is a 82 y.o. female on day 10 of admission presenting with COVID-19.      Subjective   Seen and examined.  Resting comfortably in bed.   No acute events, does not offer specific complaints.     Objective      Vitals 24HR  Heart Rate:  []   Temp:  [36 °C (96.8 °F)-36.7 °C (98.1 °F)]   Resp:  [18-20]   BP: (133-178)/(57-96)   SpO2:  [93 %-98 %]     Intake/Output last 3 Shifts:    Intake/Output Summary (Last 24 hours) at 10/12/2023 1300  Last data filed at 10/12/2023 0600  Gross per 24 hour   Intake 358 ml   Output 900 ml   Net -542 ml         Physical Exam    Relevant Results  Results from last 7 days   Lab Units 10/10/23  0552 10/09/23  0635 10/08/23  1016   WBC AUTO x10*3/uL 6.8 6.6 7.0   HEMOGLOBIN g/dL 12.1 12.5 12.5   HEMATOCRIT % 34.8* 35.0* 34.9*   PLATELETS AUTO x10*3/uL 262 259 238       Results from last 7 days   Lab Units 10/12/23  0538 10/11/23  1746 10/11/23  0607   SODIUM mmol/L 132* 131* 132*   POTASSIUM mmol/L 3.8 3.7 3.7   CHLORIDE mmol/L 102 98 101   CO2 mmol/L 25 27 25   BUN mg/dL 12 14 13   CREATININE mg/dL 0.54 0.63 0.49*   GLUCOSE mg/dL 92 104* 108*   CALCIUM mg/dL 8.0* 8.5* 8.2*         XR chest 2 views   Final Result   Cardiomegaly with diffuse interstitial pulmonary opacities suggestive   of pulmonary edema. Superimposed infection not excluded.        Signed by: Stacey Howell 10/2/2023 12:17 PM   Dictation workstation:   [id number]        Scheduled medications  brimonidine, 1 drop, Both Eyes, q12h  cefTRIAXone, 1 g, intravenous, q24h  clotrimazole-betamethasone, 1 Application, Topical, BID  cycloSPORINE, 1 drop, Both Eyes, q12h  enoxaparin, 40 mg, subcutaneous, Daily  latanoprost, 1 drop, Both Eyes, Nightly  levothyroxine, 25 mcg, oral, Daily  melatonin, 3 mg, oral, Nightly  pantoprazole, 20 mg, oral, Daily before breakfast  polyethylene glycol, 17 g, oral, Daily  propranolol, 20 mg, oral, BID  QUEtiapine, 25 mg, oral, Nightly  sertraline, 50 mg, oral,  Daily  tamsulosin, 0.4 mg, oral, Nightly  timolol, 1 drop, Both Eyes, Daily      Continuous medications     PRN medications  PRN medications: fluticasone, oxygen, prochlorperazine         Assessment/Plan      Qi Davila is a 82 y.o. female with a past medical history of headache,  depression, anxiety, suicidal ideation, arthritis, GERD, glaucoma, thyroid disorder, obstructive sleep apnea and chronic hyponatremia who presented with headache and suicidal ideation on 10/2.  She was found to be COVID-positive.  She has required supplemental O2.  Was seen by infectious disease and placed on remdesivir.  She was found to have a serum sodium of 124 mmol/a liter with normal renal function.  Review of her labs shows chronic hyponatremia.  She is on an SSRI.  Prior hyponatremia work-up is noted with a low uric acid of 2.2, TSH is normal, no cortisol albeit blood pressures are generous.  Her serum osmolality is 259, urine osmolality of 450 with a spot urine sodium of 18.  She remains in COVID isolation.  Nephrology is consulted for renal care.     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  Her sodium this admission remained in the low 120s. She was asymptomatic. I gave her a dose of tolvaptan 15 mg. Her sodium reasonably improved and is trending above 130. I have placed her on a 1.5 L fluid restriction in 24 hours. This may be difficult for her. I have requested that she use a salt packet with breakfast and dinner. Otherwise,  she has taken Zoloft for greater than 10 years and had suicidal ideation when she presented thus we will not change this medication. No renal barriers to discharge. Please ensure that she has a RFP drawn early next week faxed to me [phone].        Principal Problem:    COVID-19  Active Problems:    Anxiety    Pancreatic lesion            I spent 35 minutes in the professional and overall care of this patient.      Richie Fernandez, DO

## 2023-10-13 ENCOUNTER — DOCUMENTATION (OUTPATIENT)
Dept: CARE COORDINATION | Facility: CLINIC | Age: 83
End: 2023-10-13
Payer: MEDICARE

## 2023-10-13 LAB
ANION GAP SERPL CALC-SCNC: 10 MMOL/L (ref 10–20)
BUN SERPL-MCNC: 12 MG/DL (ref 6–23)
CALCIUM SERPL-MCNC: 8.1 MG/DL (ref 8.6–10.3)
CHLORIDE SERPL-SCNC: 98 MMOL/L (ref 98–107)
CO2 SERPL-SCNC: 25 MMOL/L (ref 21–32)
CREAT SERPL-MCNC: 0.56 MG/DL (ref 0.5–1.05)
GFR SERPL CREATININE-BSD FRML MDRD: 90 ML/MIN/1.73M*2
GLUCOSE SERPL-MCNC: 127 MG/DL (ref 74–99)
POTASSIUM SERPL-SCNC: 3.5 MMOL/L (ref 3.5–5.3)
SODIUM SERPL-SCNC: 129 MMOL/L (ref 136–145)

## 2023-10-13 PROCEDURE — 1100000001 HC PRIVATE ROOM DAILY

## 2023-10-13 PROCEDURE — 36415 COLL VENOUS BLD VENIPUNCTURE: CPT | Performed by: INTERNAL MEDICINE

## 2023-10-13 PROCEDURE — 80048 BASIC METABOLIC PNL TOTAL CA: CPT | Performed by: INTERNAL MEDICINE

## 2023-10-13 PROCEDURE — 2500000001 HC RX 250 WO HCPCS SELF ADMINISTERED DRUGS (ALT 637 FOR MEDICARE OP): Performed by: PHYSICIAN ASSISTANT

## 2023-10-13 PROCEDURE — 96372 THER/PROPH/DIAG INJ SC/IM: CPT | Performed by: FAMILY MEDICINE

## 2023-10-13 PROCEDURE — 2500000004 HC RX 250 GENERAL PHARMACY W/ HCPCS (ALT 636 FOR OP/ED): Performed by: FAMILY MEDICINE

## 2023-10-13 PROCEDURE — 2500000004 HC RX 250 GENERAL PHARMACY W/ HCPCS (ALT 636 FOR OP/ED): Performed by: PHYSICIAN ASSISTANT

## 2023-10-13 PROCEDURE — 2500000004 HC RX 250 GENERAL PHARMACY W/ HCPCS (ALT 636 FOR OP/ED): Performed by: NURSE PRACTITIONER

## 2023-10-13 PROCEDURE — 97535 SELF CARE MNGMENT TRAINING: CPT | Mod: GO

## 2023-10-13 PROCEDURE — 2500000004 HC RX 250 GENERAL PHARMACY W/ HCPCS (ALT 636 FOR OP/ED): Performed by: INTERNAL MEDICINE

## 2023-10-13 PROCEDURE — 97530 THERAPEUTIC ACTIVITIES: CPT | Mod: GO

## 2023-10-13 PROCEDURE — 2500000004 HC RX 250 GENERAL PHARMACY W/ HCPCS (ALT 636 FOR OP/ED): Performed by: PSYCHIATRY & NEUROLOGY

## 2023-10-13 PROCEDURE — 2500000001 HC RX 250 WO HCPCS SELF ADMINISTERED DRUGS (ALT 637 FOR MEDICARE OP): Performed by: FAMILY MEDICINE

## 2023-10-13 RX ORDER — ALPRAZOLAM 0.5 MG/1
0.5 TABLET ORAL ONCE
Status: COMPLETED | OUTPATIENT
Start: 2023-10-13 | End: 2023-10-13

## 2023-10-13 RX ADMIN — TIMOLOL MALEATE 1 DROP: 5 SOLUTION/ DROPS OPHTHALMIC at 11:02

## 2023-10-13 RX ADMIN — ALPRAZOLAM 0.5 MG: 0.5 TABLET ORAL at 19:18

## 2023-10-13 RX ADMIN — PROCHLORPERAZINE EDISYLATE 10 MG: 5 INJECTION INTRAMUSCULAR; INTRAVENOUS at 14:57

## 2023-10-13 RX ADMIN — PANTOPRAZOLE SODIUM 20 MG: 20 TABLET, DELAYED RELEASE ORAL at 07:01

## 2023-10-13 RX ADMIN — POLYETHYLENE GLYCOL (3350) 17 G: 17 POWDER, FOR SOLUTION ORAL at 11:01

## 2023-10-13 RX ADMIN — Medication 3 MG: at 20:47

## 2023-10-13 RX ADMIN — LEVOTHYROXINE SODIUM 25 MCG: 0.03 TABLET ORAL at 11:01

## 2023-10-13 RX ADMIN — TAMSULOSIN HYDROCHLORIDE 0.4 MG: 0.4 CAPSULE ORAL at 20:47

## 2023-10-13 RX ADMIN — CLOTRIMAZOLE AND BETAMETHASONE DIPROPIONATE 1 APPLICATION: 10; .64 CREAM TOPICAL at 20:47

## 2023-10-13 RX ADMIN — PROCHLORPERAZINE EDISYLATE 10 MG: 5 INJECTION INTRAMUSCULAR; INTRAVENOUS at 03:10

## 2023-10-13 RX ADMIN — ENOXAPARIN SODIUM 40 MG: 40 INJECTION SUBCUTANEOUS at 11:00

## 2023-10-13 RX ADMIN — SERTRALINE HYDROCHLORIDE 50 MG: 50 TABLET ORAL at 11:01

## 2023-10-13 RX ADMIN — LATANOPROST 1 DROP: 50 SOLUTION OPHTHALMIC at 20:48

## 2023-10-13 RX ADMIN — CEFTRIAXONE SODIUM 1 G: 1 INJECTION, SOLUTION INTRAVENOUS at 14:12

## 2023-10-13 RX ADMIN — PROPRANOLOL HYDROCHLORIDE 20 MG: 10 TABLET ORAL at 20:47

## 2023-10-13 RX ADMIN — QUETIAPINE FUMARATE 25 MG: 25 TABLET ORAL at 20:47

## 2023-10-13 RX ADMIN — PROPRANOLOL HYDROCHLORIDE 20 MG: 10 TABLET ORAL at 11:01

## 2023-10-13 ASSESSMENT — COGNITIVE AND FUNCTIONAL STATUS - GENERAL
DRESSING REGULAR UPPER BODY CLOTHING: A LITTLE
DRESSING REGULAR LOWER BODY CLOTHING: A LOT
MOVING FROM LYING ON BACK TO SITTING ON SIDE OF FLAT BED WITH BEDRAILS: A LITTLE
CLIMB 3 TO 5 STEPS WITH RAILING: A LOT
DRESSING REGULAR UPPER BODY CLOTHING: A LITTLE
DRESSING REGULAR LOWER BODY CLOTHING: A LITTLE
STANDING UP FROM CHAIR USING ARMS: A LITTLE
HELP NEEDED FOR BATHING: A LOT
MOBILITY SCORE: 15
PERSONAL GROOMING: A LITTLE
WALKING IN HOSPITAL ROOM: A LOT
CLIMB 3 TO 5 STEPS WITH RAILING: A LOT
MOBILITY SCORE: 19
DAILY ACTIVITIY SCORE: 16
TOILETING: A LOT
MOVING TO AND FROM BED TO CHAIR: A LITTLE
PERSONAL GROOMING: A LITTLE
DAILY ACTIVITIY SCORE: 19
TURNING FROM BACK TO SIDE WHILE IN FLAT BAD: A LITTLE
WALKING IN HOSPITAL ROOM: A LITTLE
HELP NEEDED FOR BATHING: A LITTLE
DRESSING REGULAR LOWER BODY CLOTHING: A LITTLE
EATING MEALS: A LITTLE
STANDING UP FROM CHAIR USING ARMS: A LOT
DAILY ACTIVITIY SCORE: 19
DRESSING REGULAR UPPER BODY CLOTHING: A LITTLE
MOVING TO AND FROM BED TO CHAIR: A LITTLE
TOILETING: A LITTLE
TOILETING: A LITTLE
HELP NEEDED FOR BATHING: A LITTLE

## 2023-10-13 ASSESSMENT — ACTIVITIES OF DAILY LIVING (ADL): HOME_MANAGEMENT_TIME_ENTRY: 12

## 2023-10-13 ASSESSMENT — PAIN SCALES - GENERAL
PAINLEVEL_OUTOF10: 7
PAINLEVEL_OUTOF10: 0 - NO PAIN
PAINLEVEL_OUTOF10: 0 - NO PAIN

## 2023-10-13 ASSESSMENT — PAIN - FUNCTIONAL ASSESSMENT: PAIN_FUNCTIONAL_ASSESSMENT: 0-10

## 2023-10-13 NOTE — CARE PLAN
The patient's goals for the shift include Pt. will have a safe, restful and uneventful evening    T

## 2023-10-13 NOTE — PROGRESS NOTES
Occupational Therapy    Occupational Therapy Treatment    Name: Qi Davila  MRN: [id number]  : 1940  Date: 10/13/23  Time Calculation  Start Time: 1340  Stop Time: 1404  Time Calculation (min): 24 min    Assessment:  OT Assessment: Pt seen for OT tx, continues with fatigue with activity assist wit ADLS and mobility  Prognosis: Good  Barriers to Discharge: None  Evaluation/Treatment Tolerance: Patient limited by fatigue  Medical Staff Made Aware: Yes  End of Session Communication: Bedside nurse  End of Session Patient Position: Up in chair, Alarm off, not on at start of session  Plan:  Treatment Interventions: ADL retraining, Functional transfer training, Endurance training  OT Frequency: 3 times per week  OT Discharge Recommendations: Moderate intensity level of continued care  Equipment Recommended upon Discharge: Wheeled walker    Subjective   General:  OT Last Visit  OT Received On: 10/10/23  General  Reason for Referral: Weakness due to COVID+  Referred By: Rajendra  Past Medical History Relevant to Rehab: Pt is an 82 year old female presenting with abdominal pain, cough, fatigue, anxiety, and reported suicidal ideation. Pt seen in ED and did test positive for COVID. She has never been vaccinated.  Pt started on decadron and admitted. Did get dose of remdesivir in the ED. Hx of HTN, HLD, urinary retention, anxiety/depression.  Missed Visit: No  Missed Visit Reason: Patient refused  Prior to Session Communication: Bedside nurse  Patient Position Received: Bed, 3 rail up, Alarm on  General Comment: Pt cleared by nursing pt agreeable to OT tx  Vitals:     Pain Assessment:  Pain Assessment  Pain Assessment: 0-10  Pain Score: 0 - No pain     Objective   Activities of Daily Living:      Toileting  Toileting Level of Assistance: Close supervision  Where Assessed: Toilet  Toileting Comments:  (Completed toilet hygiene and transfer with close suprvision/SBA. pt utilized grab bar for transfers)       Bed  Mobility/Transfers: Bed Mobility  Bed Mobility: Yes  Bed Mobility 1  Bed Mobility 1: Supine to sitting  Level of Assistance 1: Close supervision  Bed Mobility Comments 1: bed rails no hands on assist  Bed Mobility 2  Bed Mobility  2: Sitting to supine  Level of Assistance 2: Close supervision  Bed Mobility Comments 2: encouragement and increased time to complete    Transfers  Transfer: Yes  Transfer 1  Technique 1: Sit to stand  Transfer Device 1: Walker  Transfer Level of Assistance 1: Close supervision  Trials/Comments 1: slight LOB with sit-stand while coming to standing which pt was able to correct  Transfers 2  Transfer From 2: Stand to  Transfer to 2: Toilet  Technique 2: Stand to sit  Transfer Device 2: Walker (Grab bar)  Transfer Level of Assistance 2: Close supervision  Transfers 3  Transfer From 3: Toilet to  Transfer to 3: Stand  Technique 3: Stand to sit (on commode)  Transfer Device 3: Walker (grab bar)  Transfer Level of Assistance 3: Close supervision, Minimal verbal cues  Transfers 4  Technique 4: Stand to sit  Transfer Device 4: Walker  Transfer Level of Assistance 4: Close supervision, Minimal verbal cues, Minimal tactile cues       Therapy/Activity: Therapeutic Activity  Therapeutic Activity Performed: Yes  Therapeutic Activity 1:  (Pt ambulated to bathroom with wheeled walker, CGA, Fatigue with activity noted)     Strength:  Strength  Strength Comments: WFL          Outcome Measures:  [facility] Daily Activity  Putting on and taking off regular lower body clothing: A lot  Bathing (including washing, rinsing, drying): A lot  Putting on and taking off regular upper body clothing: A little  Toileting, which includes using toilet, bedpan or urinal: A little  Taking care of personal grooming such as brushing teeth: A little  Eating Meals: A little  Daily Activity - Total Score: 16        Education Documentation  Precautions, taught by Kaylee You OT at 10/13/2023  2:51 PM.  Learner:  Patient  Readiness: Acceptance  Method: Explanation, Demonstration  Response: Verbalizes Understanding, Needs Reinforcement    Home Exercise Program, taught by Kaylee You OT at 10/13/2023  2:51 PM.  Learner: Patient  Readiness: Acceptance  Method: Explanation, Demonstration  Response: Verbalizes Understanding, Needs Reinforcement    ADL Training, taught by Kaylee You OT at 10/13/2023  2:51 PM.  Learner: Patient  Readiness: Acceptance  Method: Explanation, Demonstration  Response: Verbalizes Understanding, Needs Reinforcement    Education Comments  No comments found.      Goals:  Encounter Problems       Encounter Problems (Active)       ADLs       Patient will perform UB and LB bathing 75% with modified independent level of assistance and adaptive equipment prn  (Progressing)       Start:  10/10/23    Expected End:  10/24/23            Patient with complete lower body dressing with minimal assist  level of assistance donning and doffing all LE clothes  with PRN adaptive equipment while supported sitting (Progressing)       Start:  10/10/23    Expected End:  10/24/23            Patient will complete toileting including hygiene clothing management/hygiene with modified independent level of assistance and Adaptive equipment prn  (Progressing)       Start:  10/10/23    Expected End:  10/24/23               Balance       STG - Maintains dynamic standing balance with upper extremity support (Progressing)       Start:  10/09/23    Expected End:  10/23/23       INTERVENTIONS:  1. Practice standing with minimal support.  2. Educate patient about standing tolerance.  3. Educate patient about independence with gait, transfers, and ADL's.  4. Educate patient about use of assistive device.  5. Educate patient about self-directed care.            COGNITION/SAFETY       Pt will recall events of tx session with 90% accuracy with cues prn  (Progressing)       Start:  10/10/23    Expected End:  10/24/23                Mobility       STG - Patient will ambulate (Progressing)       Start:  10/09/23    Expected End:  10/23/23       >/= 75', device PRN, MOD I, no LOB               Safety       Goal 1 (Progressing)       Start:  10/09/23    Expected End:  10/23/23       Pt will verbalize and apply safety awareness and precautions 100% of time throughout entire session               TRANSFERS       Patient will complete sit to stand transfer with supervision level of assistance and least restrictive device in order to improve safety and prepare for out of bed mobility. (Progressing)       Start:  10/10/23    Expected End:  10/24/23               Transfers       STG - Patient will perform bed mobility (Progressing)       Start:  10/09/23    Expected End:  10/23/23       At MOD I level, safely, no LOB         STG - Patient will transfer sit to and from stand (Progressing)       Start:  10/09/23    Expected End:  10/23/23       At MOD I level, safely, no LOB

## 2023-10-13 NOTE — PROGRESS NOTES
Care Coordinator Note:  Qi Davila is a 82 y.o. female on day 11 of admission presenting with COVID-19.  Met with patient to update her on discharge plan.  Waiting to see if MD did peer to peer due to insurance planning on intent to deny.  If denied, patient will go home with [facility] for a RN, PT, OT, HHA.  Anya Hall RN TCC

## 2023-10-13 NOTE — PROGRESS NOTES
Qi Davila is a 82 y.o. female on day 11 of admission presenting with COVID-19.      Subjective   Seen and examined.  Sitting up in a chair. Reports a healthy appetite.   No acute events, does not offer specific complaints.       Objective      Vitals 24HR  Heart Rate:  [61-81]   Temp:  [36.3 °C (97.3 °F)-36.8 °C (98.2 °F)]   Resp:  [18-20]   BP: (148-167)/(68-84)   SpO2:  [92 %-96 %]     Intake/Output last 3 Shifts:    Intake/Output Summary (Last 24 hours) at 10/13/2023 1608  Last data filed at 10/13/2023 0318  Gross per 24 hour   Intake --   Output 1675 ml   Net -1675 ml         Physical Exam  Constitutional: Elderly female, alert and oriented x3 in no acute distress  Eyes: Normal external exam.   Ears, Nose, Mouth, and Throat: External inspection of ears and nose: Normal.    Neck: No neck mass was observed. Supple. Thyroid not enlarged.  Cardiovascular: Normal heart rate and rhythm, normal S1 and S2, no gallops, no murmurs and no pericardial rub.   Pulmonary: No respiratory distress. Clear bilateral breath sounds.   Abdomen: Soft nontender; no abdominal mass palpated. Normal bowel sounds.   Musculoskeletal: No joint swelling   Skin: Warm and dry  Neurologic: Cranial nerves 2-12 grossly intact. Non focal  Lymphatic: No cervical lymphadenopathy.   Relevant Results  Results from last 7 days   Lab Units 10/10/23  0552 10/09/23  0635 10/08/23  1016   WBC AUTO x10*3/uL 6.8 6.6 7.0   HEMOGLOBIN g/dL 12.1 12.5 12.5   HEMATOCRIT % 34.8* 35.0* 34.9*   PLATELETS AUTO x10*3/uL 262 259 238       Results from last 7 days   Lab Units 10/12/23  0538 10/11/23  1746 10/11/23  0607   SODIUM mmol/L 132* 131* 132*   POTASSIUM mmol/L 3.8 3.7 3.7   CHLORIDE mmol/L 102 98 101   CO2 mmol/L 25 27 25   BUN mg/dL 12 14 13   CREATININE mg/dL 0.54 0.63 0.49*   GLUCOSE mg/dL 92 104* 108*   CALCIUM mg/dL 8.0* 8.5* 8.2*         XR chest 2 views   Final Result   Cardiomegaly with diffuse interstitial pulmonary opacities suggestive   of  pulmonary edema. Superimposed infection not excluded.        Signed by: Stacey Howell 10/2/2023 12:17 PM   Dictation workstation:   [id number]        Scheduled medications  brimonidine, 1 drop, Both Eyes, q12h  cefTRIAXone, 1 g, intravenous, q24h  clotrimazole-betamethasone, 1 Application, Topical, BID  cycloSPORINE, 1 drop, Both Eyes, q12h  enoxaparin, 40 mg, subcutaneous, Daily  latanoprost, 1 drop, Both Eyes, Nightly  levothyroxine, 25 mcg, oral, Daily  melatonin, 3 mg, oral, Nightly  pantoprazole, 20 mg, oral, Daily before breakfast  polyethylene glycol, 17 g, oral, Daily  propranolol, 20 mg, oral, BID  QUEtiapine, 25 mg, oral, Nightly  sertraline, 50 mg, oral, Daily  tamsulosin, 0.4 mg, oral, Nightly  timolol, 1 drop, Both Eyes, Daily      Continuous medications     PRN medications  PRN medications: fluticasone, oxygen, prochlorperazine         Assessment/Plan      Qi Davila is a 82 y.o. female with a past medical history of headache,  depression, anxiety, suicidal ideation, arthritis, GERD, glaucoma, thyroid disorder, obstructive sleep apnea and chronic hyponatremia who presented with headache and suicidal ideation on 10/2.  She was found to be COVID-positive.  She has required supplemental O2.  Was seen by infectious disease and placed on remdesivir.  She was found to have a serum sodium of 124 mmol/a liter with normal renal function.  Review of her labs shows chronic hyponatremia.  She is on an SSRI.  Prior hyponatremia work-up is noted with a low uric acid of 2.2, TSH is normal, no cortisol albeit blood pressures are generous.  Her serum osmolality is 259, urine osmolality of 450 with a spot urine sodium of 18.  She remains in COVID isolation.  Nephrology is consulted for renal care.     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  Her sodium this admission remained in the low 120s. She was asymptomatic. I gave her a dose of tolvaptan 15 mg. Her sodium reasonably improved and is trending  above 130. I have placed her on a 1.5 L fluid restriction in 24 hours which we recommend she continue upon discharge. I'm hoping that this is not difficult for her. I have requested that she use a salt packet with breakfast and dinner. Otherwise,  she has taken Zoloft for greater than 10 years and had suicidal ideation when she presented thus we will not change this medication. No renal barriers to discharge. Please ensure that she has a RFP drawn early next week faxed to me [phone].        Principal Problem:    COVID-19  Active Problems:    Anxiety    Pancreatic lesion            I spent 35 minutes in the professional and overall care of this patient.      Richie Fernandez, DO

## 2023-10-13 NOTE — PROGRESS NOTES
INPATIENT PROGRESS NOTES    PRIMARY SERVICE: Angel Drew MD         10/12/2023  11:52 PM      INTERVAL HPI: Pt feels OK  Seen earlier this morning   No chest pain  Does have cough   Tolerating PO  Sodium improving       PERTINENT ROS:  REVIEW OF SYSTEMS  GENERAL: negative for fever, SEE HPI  RESPIRATORY: Negative for cough, wheezing or shortness of breath.  CARDIOVASCULAR: Negative for chest pain, leg swelling or palpitations.  GI: Negative for abdominal discomfort, blood in stools or black stools or change in bowel habits  NEURO: no change per nursing  All other reviewed and negative other than HPI.      MEDICATIONS:    No current facility-administered medications on file prior to encounter.     Current Outpatient Medications on File Prior to Encounter   Medication Sig Dispense Refill    busPIRone (Buspar) 5 mg tablet Take 1 tablet (5 mg) by mouth 2 times a day.      clobetasol (Temovate) 0.05 % external solution Apply 1 Application topically 2 times a day.      clotrimazole-betamethasone (Lotrisone) cream Apply 1 Application topically 2 times a day.      famciclovir (Famvir) 500 mg tablet Take 1 tablet (500 mg) by mouth 1 time if needed. With oubreak      latanoprost (Xalatan) 0.005 % ophthalmic solution Administer 1 drop into both eyes once daily at bedtime.      levothyroxine (Synthroid, Levoxyl) 25 mcg tablet Take 1 tablet (25 mcg) by mouth once daily.      propranolol (Inderal) 20 mg tablet Take 1 tablet (20 mg) by mouth 2 times a day.      Restasis 0.05 % ophthalmic emulsion Administer 1 drop into both eyes every 12 hours.      sertraline (Zoloft) 50 mg tablet Take 2 tablets (100 mg) by mouth once daily.      Alphagan P 0.1 % ophthalmic solution Administer 1 drop into both eyes every 12 hours.      ciclopirox 1 % shampoo Apply 1 Application topically once daily at bedtime.      dexlansoprazole (Dexilant) 30 mg DR capsule Take 1 capsule (30 mg) by mouth once daily.      fish oil concentrate (Omega-3)  120-180 mg capsule Take 1 capsule (1 g) by mouth once daily.      fluticasone (Flonase) 50 mcg/actuation nasal spray Administer 1 spray into each nostril once daily.      QUEtiapine (SEROquel) 50 mg tablet Take 1 tablet (50 mg) by mouth once daily at bedtime.      timolol (Timoptic) 0.5 % ophthalmic solution Administer 1 drop into both eyes once daily.           PHYSICAL EXAM:   Vitals:    10/12/23 1223 10/12/23 1610 10/12/23 2102 10/12/23 2340   BP: (!) 154/96 163/84 164/68    BP Location: Left arm Left arm     Patient Position: Sitting Lying     Pulse: 102 62 67    Resp: 20 18 18 18   Temp: 36.3 °C (97.3 °F) 36.8 °C (98.2 °F) 36.5 °C (97.7 °F)    TempSrc: Temporal Temporal     SpO2: 98% 96% 93%    Weight:       Height:            PHYSICAL EXAMINATION:    General appearance: thin frail elderly   Skin: skin color, texture, turgor normal,   HEENT: Anicteric sclera.  Oropharynx mucosa moist  Neck: Supple, no adenopathy;   Back: no pain to palpation over spine or costovertebral angles,   Lungs: clear to auscultation, no wheezing or rhonchi  Heart: RRR without murmur, gallop, or rubs.   Abdomen: Abdomen soft, non-tender. Bowel sounds normal. No masses, organomegaly   does have chr de la garza   Extremities: Extremities normal. No  edema, or skin discoloration.   Musculoskeletal: Spine range of motion normal. Muscular strength intact  Neuro: Oriented X  3    DATA: CBC, Coags, BMP, Mg, Phos   Results for orders placed or performed during the hospital encounter of 10/02/23 (from the past 96 hour(s))   CBC   Result Value Ref Range    WBC 6.6 4.4 - 11.3 x10*3/uL    nRBC 0.0 0.0 - 0.0 /100 WBCs    RBC 3.90 (L) 4.00 - 5.20 x10*6/uL    Hemoglobin 12.5 12.0 - 16.0 g/dL    Hematocrit 35.0 (L) 36.0 - 46.0 %    MCV 90 80 - 100 fL    MCH 32.1 26.0 - 34.0 pg    MCHC 35.7 32.0 - 36.0 g/dL    RDW 13.9 11.5 - 14.5 %    Platelets 259 150 - 450 x10*3/uL    MPV 10.8 7.5 - 11.5 fL   Basic metabolic panel   Result Value Ref Range    Glucose 105  (H) 74 - 99 mg/dL    Sodium 124 (L) 136 - 145 mmol/L    Potassium 3.7 3.5 - 5.3 mmol/L    Chloride 93 (L) 98 - 107 mmol/L    Bicarbonate 26 21 - 32 mmol/L    Anion Gap 9 (L) 10 - 20 mmol/L    Urea Nitrogen 13 6 - 23 mg/dL    Creatinine 0.52 0.50 - 1.05 mg/dL    eGFR >90 >60 mL/min/1.73m*2    Calcium 7.9 (L) 8.6 - 10.3 mg/dL   CBC   Result Value Ref Range    WBC 6.8 4.4 - 11.3 x10*3/uL    nRBC 0.0 0.0 - 0.0 /100 WBCs    RBC 3.87 (L) 4.00 - 5.20 x10*6/uL    Hemoglobin 12.1 12.0 - 16.0 g/dL    Hematocrit 34.8 (L) 36.0 - 46.0 %    MCV 90 80 - 100 fL    MCH 31.3 26.0 - 34.0 pg    MCHC 34.8 32.0 - 36.0 g/dL    RDW 14.1 11.5 - 14.5 %    Platelets 262 150 - 450 x10*3/uL    MPV 10.4 7.5 - 11.5 fL   Basic metabolic panel   Result Value Ref Range    Glucose 103 (H) 74 - 99 mg/dL    Sodium 124 (L) 136 - 145 mmol/L    Potassium 3.8 3.5 - 5.3 mmol/L    Chloride 93 (L) 98 - 107 mmol/L    Bicarbonate 27 21 - 32 mmol/L    Anion Gap 8 (L) 10 - 20 mmol/L    Urea Nitrogen 12 6 - 23 mg/dL    Creatinine 0.45 (L) 0.50 - 1.05 mg/dL    eGFR >90 >60 mL/min/1.73m*2    Calcium 8.1 (L) 8.6 - 10.3 mg/dL   Basic metabolic panel   Result Value Ref Range    Glucose 108 (H) 74 - 99 mg/dL    Sodium 132 (L) 136 - 145 mmol/L    Potassium 3.7 3.5 - 5.3 mmol/L    Chloride 101 98 - 107 mmol/L    Bicarbonate 25 21 - 32 mmol/L    Anion Gap 10 10 - 20 mmol/L    Urea Nitrogen 13 6 - 23 mg/dL    Creatinine 0.49 (L) 0.50 - 1.05 mg/dL    eGFR >90 >60 mL/min/1.73m*2    Calcium 8.2 (L) 8.6 - 10.3 mg/dL   Urine culture    Specimen: Indwelling (Samson) Catheter; Urine   Result Value Ref Range    Urine Culture No significant growth    Renal function panel   Result Value Ref Range    Glucose 104 (H) 74 - 99 mg/dL    Sodium 131 (L) 136 - 145 mmol/L    Potassium 3.7 3.5 - 5.3 mmol/L    Chloride 98 98 - 107 mmol/L    Bicarbonate 27 21 - 32 mmol/L    Anion Gap 10 10 - 20 mmol/L    Urea Nitrogen 14 6 - 23 mg/dL    Creatinine 0.63 0.50 - 1.05 mg/dL    eGFR 88 >60  "mL/min/1.73m*2    Calcium 8.5 (L) 8.6 - 10.3 mg/dL    Phosphorus 3.8 2.5 - 4.9 mg/dL    Albumin 3.5 3.4 - 5.0 g/dL   Basic metabolic panel   Result Value Ref Range    Glucose 92 74 - 99 mg/dL    Sodium 132 (L) 136 - 145 mmol/L    Potassium 3.8 3.5 - 5.3 mmol/L    Chloride 102 98 - 107 mmol/L    Bicarbonate 25 21 - 32 mmol/L    Anion Gap 9 (L) 10 - 20 mmol/L    Urea Nitrogen 12 6 - 23 mg/dL    Creatinine 0.54 0.50 - 1.05 mg/dL    eGFR >90 >60 mL/min/1.73m*2    Calcium 8.0 (L) 8.6 - 10.3 mg/dL   Lavender Top   Result Value Ref Range    Extra Tube Hold for add-ons.    POCT GLUCOSE   Result Value Ref Range    POCT Glucose 122 (H) 74 - 99 mg/dL           ASSESSMENT AND PLAN:     Principal Problem:    COVID-19  Active Problems:    Anxiety    Pancreatic lesion      #COVID-pneumonia  Completed 5 days of remdesivir  Denies any shortness of breath  Does have cough   Off of isolation       #Depression with anxiety  Continue home medications  Can follow up w psyche as out patient      #Hyponatremia  Improving  Renal on board      #Hypokalemia     #Chronic De La Garza catheter  Exchange De La Garza catheter >> this was done yesterday, pt upset she \"had to have it done\"   Continue Flomax    #UTI (CAUTI)  C/wIV antibiotics  Urine culture  pending >. Not clear if ever sent   Cont with rocephin  Needs de la garza changed, last done 09/11/23 >> now done 10/9/23  Needs to be changed every 4 weeks      Hyponatremmia persists  Renal consulted D/w DrGaines     PTOT    Dc plan for snf       Plan of care discussed with: Provider, RN, Patient.         Angel Drew MD    "

## 2023-10-13 NOTE — CARE PLAN
Problem: Nutrition  Goal: Oral intake greater 75%  10/13/2023 0940 by Rani Tamez RN  Outcome: Progressing  10/13/2023 0832 by Rani Tamez RN  Outcome: Progressing  Goal: Consume prescribed supplement  10/13/2023 0940 by Rani Tamez RN  Outcome: Progressing  10/13/2023 0832 by Rani Tamez RN  Outcome: Progressing  Goal: Adequate PO fluid intake  10/13/2023 0940 by Rani Tamez RN  Outcome: Progressing  10/13/2023 0832 by Rani Tamez RN  Outcome: Progressing  Goal: Nutrition support goals are met within 48 hrs  10/13/2023 0940 by Rani Tamez RN  Outcome: Progressing  10/13/2023 0832 by Rani Tamez RN  Outcome: Progressing  Goal: Nutrition support is meeting 75% of nutrient needs  10/13/2023 0940 by Rani Tamez RN  Outcome: Progressing  10/13/2023 0832 by Rani Tamez RN  Outcome: Progressing     Problem: Fall/Injury  Goal: Not fall by end of shift  10/13/2023 0940 by Rani Tamez RN  Outcome: Progressing  10/13/2023 0832 by Rani Tamez RN  Outcome: Progressing  Goal: Be free from injury by end of the shift  10/13/2023 0940 by Rani Tamez RN  Outcome: Progressing  10/13/2023 0832 by Rani Tamez RN  Outcome: Progressing  Goal: Verbalize understanding of personal risk factors for fall in the hospital  10/13/2023 0940 by Rani Tamez RN  Outcome: Progressing  10/13/2023 0832 by Rani Tamez RN  Outcome: Progressing  Goal: Verbalize understanding of risk factor reduction measures to prevent injury from fall in the home  10/13/2023 0940 by Rani Tamez RN  Outcome: Progressing  10/13/2023 0832 by Rani Tamez RN  Outcome: Progressing  Goal: Use assistive devices by end of the shift  10/13/2023 0940 by Rani Tamez RN  Outcome: Progressing  10/13/2023 0832 by Rani Tamez RN  Outcome: Progressing  Goal: Pace activities to prevent fatigue by end of the shift  10/13/2023 0940 by Rani LLAMAS  Joi, EBER  Outcome: Progressing  10/13/2023 0832 by Rani Tamez RN  Outcome: Progressing   The patient's goals for the shift include Pt will be comfortable throughout shift.    The clinical goals for the shift include Pain tolerance    Over the shift, the patient did not make progress toward the following goals. Barriers to progression include . Recommendations to address these barriers include .

## 2023-10-13 NOTE — CARE PLAN
Problem: Nutrition  Goal: Oral intake greater 75%  10/13/2023 0940 by Rani Tamez RN  Outcome: Progressing  10/13/2023 0832 by Rani Tamez RN  Outcome: Progressing  Goal: Consume prescribed supplement  10/13/2023 0940 by Rani Tamez RN  Outcome: Progressing  10/13/2023 0832 by Rani Tamez RN  Outcome: Progressing  Goal: Adequate PO fluid intake  10/13/2023 0940 by Rani Tamez RN  Outcome: Progressing  10/13/2023 0832 by Rani Tamez RN  Outcome: Progressing  Goal: Nutrition support goals are met within 48 hrs  10/13/2023 0940 by Rani Tamez RN  Outcome: Progressing  10/13/2023 0832 by Rani Tamez RN  Outcome: Progressing  Goal: Nutrition support is meeting 75% of nutrient needs  10/13/2023 0940 by Rani Tamez RN  Outcome: Progressing  10/13/2023 0832 by Rani Tamez RN  Outcome: Progressing     Problem: Fall/Injury  Goal: Not fall by end of shift  10/13/2023 0940 by Rani Tamez RN  Outcome: Progressing  10/13/2023 0832 by Rani Tamez RN  Outcome: Progressing  Goal: Be free from injury by end of the shift  10/13/2023 0940 by Rani Tamez RN  Outcome: Progressing  10/13/2023 0832 by Rani Tamez RN  Outcome: Progressing  Goal: Verbalize understanding of personal risk factors for fall in the hospital  10/13/2023 0940 by Rani Tamez RN  Outcome: Progressing  10/13/2023 0832 by Rani Tamez RN  Outcome: Progressing  Goal: Verbalize understanding of risk factor reduction measures to prevent injury from fall in the home  10/13/2023 0940 by Rani Tamez RN  Outcome: Progressing  10/13/2023 0832 by Rani Tamez RN  Outcome: Progressing  Goal: Use assistive devices by end of the shift  10/13/2023 0940 by Rani Tamez RN  Outcome: Progressing  10/13/2023 0832 by Rani Tamze RN  Outcome: Progressing  Goal: Pace activities to prevent fatigue by end of the shift  10/13/2023 0940 by Rani LLAMAS  EBER Tamez  Outcome: Progressing  10/13/2023 0832 by Rani Tamez RN  Outcome: Progressing   The patient's goals for the shift include Pt will be comfortable throughout shift.    The clinical goals for the shift include Pain tolerance

## 2023-10-14 LAB — GLUCOSE BLD MANUAL STRIP-MCNC: 125 MG/DL (ref 74–99)

## 2023-10-14 PROCEDURE — 2500000004 HC RX 250 GENERAL PHARMACY W/ HCPCS (ALT 636 FOR OP/ED): Performed by: FAMILY MEDICINE

## 2023-10-14 PROCEDURE — 96372 THER/PROPH/DIAG INJ SC/IM: CPT | Performed by: FAMILY MEDICINE

## 2023-10-14 PROCEDURE — 1100000001 HC PRIVATE ROOM DAILY

## 2023-10-14 PROCEDURE — 2500000004 HC RX 250 GENERAL PHARMACY W/ HCPCS (ALT 636 FOR OP/ED): Performed by: PSYCHIATRY & NEUROLOGY

## 2023-10-14 PROCEDURE — 2500000001 HC RX 250 WO HCPCS SELF ADMINISTERED DRUGS (ALT 637 FOR MEDICARE OP): Performed by: PHYSICIAN ASSISTANT

## 2023-10-14 PROCEDURE — 2500000001 HC RX 250 WO HCPCS SELF ADMINISTERED DRUGS (ALT 637 FOR MEDICARE OP): Performed by: FAMILY MEDICINE

## 2023-10-14 PROCEDURE — 82947 ASSAY GLUCOSE BLOOD QUANT: CPT

## 2023-10-14 PROCEDURE — 2500000004 HC RX 250 GENERAL PHARMACY W/ HCPCS (ALT 636 FOR OP/ED): Performed by: NURSE PRACTITIONER

## 2023-10-14 RX ADMIN — BRIMONIDINE TARTRATE 1 DROP: 2 SOLUTION/ DROPS OPHTHALMIC at 06:27

## 2023-10-14 RX ADMIN — PROPRANOLOL HYDROCHLORIDE 20 MG: 10 TABLET ORAL at 21:24

## 2023-10-14 RX ADMIN — LATANOPROST 1 DROP: 50 SOLUTION OPHTHALMIC at 21:24

## 2023-10-14 RX ADMIN — PROPRANOLOL HYDROCHLORIDE 20 MG: 10 TABLET ORAL at 09:10

## 2023-10-14 RX ADMIN — QUETIAPINE FUMARATE 25 MG: 25 TABLET ORAL at 21:24

## 2023-10-14 RX ADMIN — SERTRALINE HYDROCHLORIDE 50 MG: 50 TABLET ORAL at 09:10

## 2023-10-14 RX ADMIN — TAMSULOSIN HYDROCHLORIDE 0.4 MG: 0.4 CAPSULE ORAL at 21:24

## 2023-10-14 RX ADMIN — LEVOTHYROXINE SODIUM 25 MCG: 0.03 TABLET ORAL at 09:10

## 2023-10-14 RX ADMIN — ENOXAPARIN SODIUM 40 MG: 40 INJECTION SUBCUTANEOUS at 09:10

## 2023-10-14 RX ADMIN — PANTOPRAZOLE SODIUM 20 MG: 20 TABLET, DELAYED RELEASE ORAL at 06:27

## 2023-10-14 RX ADMIN — TIMOLOL MALEATE 1 DROP: 5 SOLUTION/ DROPS OPHTHALMIC at 09:11

## 2023-10-14 RX ADMIN — POLYETHYLENE GLYCOL (3350) 17 G: 17 POWDER, FOR SOLUTION ORAL at 09:11

## 2023-10-14 RX ADMIN — Medication 3 MG: at 21:24

## 2023-10-14 ASSESSMENT — COGNITIVE AND FUNCTIONAL STATUS - GENERAL
MOBILITY SCORE: 17
STANDING UP FROM CHAIR USING ARMS: A LOT
TURNING FROM BACK TO SIDE WHILE IN FLAT BAD: A LITTLE
HELP NEEDED FOR BATHING: A LITTLE
WALKING IN HOSPITAL ROOM: A LITTLE
DAILY ACTIVITIY SCORE: 17
STANDING UP FROM CHAIR USING ARMS: A LOT
DRESSING REGULAR UPPER BODY CLOTHING: A LITTLE
HELP NEEDED FOR BATHING: A LITTLE
MOVING TO AND FROM BED TO CHAIR: A LITTLE
DRESSING REGULAR LOWER BODY CLOTHING: A LOT
TURNING FROM BACK TO SIDE WHILE IN FLAT BAD: A LITTLE
DRESSING REGULAR LOWER BODY CLOTHING: A LOT
PERSONAL GROOMING: A LITTLE
MOVING TO AND FROM BED TO CHAIR: A LITTLE
DAILY ACTIVITIY SCORE: 17
MOBILITY SCORE: 17
PERSONAL GROOMING: A LITTLE
DRESSING REGULAR UPPER BODY CLOTHING: A LITTLE
CLIMB 3 TO 5 STEPS WITH RAILING: A LOT
TOILETING: A LOT
CLIMB 3 TO 5 STEPS WITH RAILING: A LOT
WALKING IN HOSPITAL ROOM: A LITTLE
TOILETING: A LOT

## 2023-10-14 ASSESSMENT — PAIN SCALES - GENERAL: PAINLEVEL_OUTOF10: 0 - NO PAIN

## 2023-10-14 NOTE — CARE PLAN
The patient's goals for the shift include Pt will be comfortable throughout shift.    The clinical goals for the shift include Pt will remain safe    Over the shift, the patient did  make progress toward the following goals. Barriers to progression included checking and changing q2 with turning.  Problem: Skin  Goal: Decreased wound size/increased tissue granulation at next dressing change  Outcome: Not Progressing     Problem: Pain  Goal: Performs ADL's with improved pain control throughout shift  Outcome: Not Progressing  Goal: Participates in PT with improved pain control throughout the shift  Outcome: Not Progressing

## 2023-10-14 NOTE — PROGRESS NOTES
TCC Weekend coverage:  Awaiting Dr. Drew's response for Peer to Peer . This TCC obtained  Intent to deny information from [employer] Thursday 10/14/2023. Per PCN notation:  [place]      Intent to deny  Qi Davila   MRN: [id number]                : 1940                [employer] ID: [id number]              [employer] REF #:[id number]           [facility]  Peer to Peer - if done - will need to be called by 12:00 PM Tomorrow 10/13   Call: [phone] option 4  If you want to jump straight to appeal: [phone] or fax: [phone].  Weekend sign out informs MD response pending if P2P denied, [facility]     Request sent to Dr. Drew via secure chat. TCC to follow up on tomorrow for weekend coverage.   Tamera CARLOS RN TCC CCM

## 2023-10-14 NOTE — PROGRESS NOTES
Qi Davila is a 82 y.o. female on day 12 of admission presenting with COVID-19.      Subjective   No new complaints       Objective        Vitals 24HR  Heart Rate:  [62-70]   Temp:  [36.3 °C (97.3 °F)-36.6 °C (97.9 °F)]   Resp:  [18]   BP: (157-179)/(69-87)   SpO2:  [93 %-96 %]     Intake/Output last 3 Shifts:    Intake/Output Summary (Last 24 hours) at 10/14/2023 1551  Last data filed at 10/14/2023 0500  Gross per 24 hour   Intake --   Output 2000 ml   Net -2000 ml       Physical Exam patient is COVID-19's examination was not performed by me    Relevant Results     Results from last 7 days   Lab Units 10/10/23  0552 10/09/23  0635 10/08/23  1016   WBC AUTO x10*3/uL 6.8 6.6 7.0   HEMOGLOBIN g/dL 12.1 12.5 12.5   HEMATOCRIT % 34.8* 35.0* 34.9*   PLATELETS AUTO x10*3/uL 262 259 238      Results from last 7 days   Lab Units 10/13/23  1533 10/12/23  0538 10/11/23  1746   SODIUM mmol/L 129* 132* 131*   POTASSIUM mmol/L 3.5 3.8 3.7   CHLORIDE mmol/L 98 102 98   CO2 mmol/L 25 25 27   BUN mg/dL 12 12 14   CREATININE mg/dL 0.56 0.54 0.63   GLUCOSE mg/dL 127* 92 104*   CALCIUM mg/dL 8.1* 8.0* 8.5*        Current Facility-Administered Medications:     brimonidine (AlphaGAN P) 0.2 % ophthalmic solution 1 drop, 1 drop, Both Eyes, q12h, Angel Drew MD, 1 drop at 10/14/23 0627    clotrimazole-betamethasone (Lotrisone) cream 1 Application, 1 Application, Topical, BID, Angel Drew MD, 1 Application at 10/13/23 2047    cycloSPORINE (Restasis) 0.05 % ophthalmic emulsion 1 drop, 1 drop, Both Eyes, q12h, Angel Drew MD, 1 drop at 10/11/23 1800    enoxaparin (Lovenox) syringe 40 mg, 40 mg, subcutaneous, Daily, Angel Drew MD, 40 mg at 10/14/23 0910    fluticasone (Flonase) nasal spray 1 spray, 1 spray, Each Nostril, Daily PRN, Angel Drew MD    latanoprost (Xalatan) 0.005 % ophthalmic solution 1 drop, 1 drop, Both Eyes, Nightly, Angel Drew MD, 1 drop at 10/13/23 2048    levothyroxine (Synthroid, Levoxyl) tablet 25  mcg, 25 mcg, oral, Daily, Angel Drew MD, 25 mcg at 10/14/23 0910    melatonin tablet 3 mg, 3 mg, oral, Nightly, Destini Ramirez PA-C, 3 mg at 10/13/23 2047    oxygen (O2) therapy, , inhalation, Continuous PRN - O2/gases, Angel Drew MD    pantoprazole (ProtoNix) EC tablet 20 mg, 20 mg, oral, Daily before breakfast, Angel Drew MD, 20 mg at 10/14/23 0627    polyethylene glycol (Glycolax, Miralax) packet 17 g, 17 g, oral, Daily, Angel Drew MD, 17 g at 10/14/23 0911    prochlorperazine (Compazine) injection 10 mg, 10 mg, intravenous, q6h PRN, Destini Ramirez PA-C, 10 mg at 10/13/23 1457    propranolol (Inderal) tablet 20 mg, 20 mg, oral, BID, Angel Drew MD, 20 mg at 10/14/23 0910    QUEtiapine (SEROquel) tablet 25 mg, 25 mg, oral, Nightly, Acacia Bowling MD, 25 mg at 10/13/23 2047    sertraline (Zoloft) tablet 50 mg, 50 mg, oral, Daily, Acacia Bowling MD, 50 mg at 10/14/23 0910    tamsulosin (Flomax) 24 hr capsule 0.4 mg, 0.4 mg, oral, Nightly, UMU Kim, 0.4 mg at 10/13/23 2047    timolol (Timoptic) 0.5 % ophthalmic solution 1 drop, 1 drop, Both Eyes, Daily, Angel Drew MD, 1 drop at 10/14/23 0911           Assessment/Plan    Qi Davila is a 82 y.o. female with a past medical history of headache,  depression, anxiety, suicidal ideation, arthritis, GERD, glaucoma, thyroid disorder, obstructive sleep apnea and chronic hyponatremia who presented with headache and suicidal ideation on 10/2.  She was found to be COVID-positive.  She has required supplemental O2.  Was seen by infectious disease and placed on remdesivir.  She was found to have a serum sodium of 124 mmol/a liter with normal renal function.  Review of her labs shows chronic hyponatremia.  She is on an SSRI.  Prior hyponatremia work-up is noted with a low uric acid of 2.2, TSH is normal, no cortisol albeit blood pressures are generous.  Her serum osmolality is 259, urine osmolality of 450 with a spot urine sodium of 18.  She  "remains in COVID isolation.  Nephrology is consulted for renal care.     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  Her sodium this admission remained in the low 120s. She was asymptomatic. I gave her a dose of tolvaptan 15 mg. Her sodium reasonably improved and is trending above 130. I have placed her on a 1.5 L fluid restriction in 24 hours which we recommend she continue upon discharge. I'm hoping that this is not difficult for her. I have requested that she use a salt packet with breakfast and dinner. Otherwise,  she has taken Zoloft for greater than 10 years and had suicidal ideation when she presented thus we will not change this medication. No renal barriers to discharge. Please ensure that she has a RFP drawn early next week faxed to me [phone].           Principal Problem:    COVID-19  Active Problems:    Anxiety    Pancreatic lesion  SIADH          Principal Problem:    COVID-19  Active Problems:    Anxiety    Pancreatic lesion  Agree pulmonology\" Dr. Fernandez will follow sodium level and volume status on daily basis            I spent 20 minutes in the professional and overall care of this patient.      Terri Isaac MD  "

## 2023-10-15 LAB
ANION GAP SERPL CALC-SCNC: 10 MMOL/L (ref 10–20)
BUN SERPL-MCNC: 11 MG/DL (ref 6–23)
CALCIUM SERPL-MCNC: 8.5 MG/DL (ref 8.6–10.3)
CHLORIDE SERPL-SCNC: 97 MMOL/L (ref 98–107)
CO2 SERPL-SCNC: 27 MMOL/L (ref 21–32)
CREAT SERPL-MCNC: 0.41 MG/DL (ref 0.5–1.05)
GFR SERPL CREATININE-BSD FRML MDRD: >90 ML/MIN/1.73M*2
GLUCOSE SERPL-MCNC: 100 MG/DL (ref 74–99)
POTASSIUM SERPL-SCNC: 4 MMOL/L (ref 3.5–5.3)
SODIUM SERPL-SCNC: 130 MMOL/L (ref 136–145)

## 2023-10-15 PROCEDURE — 36415 COLL VENOUS BLD VENIPUNCTURE: CPT | Performed by: FAMILY MEDICINE

## 2023-10-15 PROCEDURE — 2500000001 HC RX 250 WO HCPCS SELF ADMINISTERED DRUGS (ALT 637 FOR MEDICARE OP): Performed by: FAMILY MEDICINE

## 2023-10-15 PROCEDURE — 2500000004 HC RX 250 GENERAL PHARMACY W/ HCPCS (ALT 636 FOR OP/ED): Performed by: PSYCHIATRY & NEUROLOGY

## 2023-10-15 PROCEDURE — 96372 THER/PROPH/DIAG INJ SC/IM: CPT | Performed by: FAMILY MEDICINE

## 2023-10-15 PROCEDURE — 80048 BASIC METABOLIC PNL TOTAL CA: CPT | Performed by: FAMILY MEDICINE

## 2023-10-15 PROCEDURE — 2500000004 HC RX 250 GENERAL PHARMACY W/ HCPCS (ALT 636 FOR OP/ED): Performed by: FAMILY MEDICINE

## 2023-10-15 PROCEDURE — 2500000004 HC RX 250 GENERAL PHARMACY W/ HCPCS (ALT 636 FOR OP/ED): Performed by: NURSE PRACTITIONER

## 2023-10-15 PROCEDURE — 1100000001 HC PRIVATE ROOM DAILY

## 2023-10-15 PROCEDURE — 2500000001 HC RX 250 WO HCPCS SELF ADMINISTERED DRUGS (ALT 637 FOR MEDICARE OP): Performed by: PHYSICIAN ASSISTANT

## 2023-10-15 RX ORDER — ACETAMINOPHEN 325 MG/1
650 TABLET ORAL EVERY 4 HOURS PRN
Status: DISCONTINUED | OUTPATIENT
Start: 2023-10-15 | End: 2023-10-17 | Stop reason: HOSPADM

## 2023-10-15 RX ORDER — ACETAMINOPHEN 160 MG/5ML
650 SOLUTION ORAL EVERY 4 HOURS PRN
Status: DISCONTINUED | OUTPATIENT
Start: 2023-10-15 | End: 2023-10-17 | Stop reason: HOSPADM

## 2023-10-15 RX ORDER — ACETAMINOPHEN 650 MG/1
650 SUPPOSITORY RECTAL EVERY 4 HOURS PRN
Status: DISCONTINUED | OUTPATIENT
Start: 2023-10-15 | End: 2023-10-17 | Stop reason: HOSPADM

## 2023-10-15 RX ADMIN — LATANOPROST 1 DROP: 50 SOLUTION OPHTHALMIC at 20:42

## 2023-10-15 RX ADMIN — ENOXAPARIN SODIUM 40 MG: 40 INJECTION SUBCUTANEOUS at 09:22

## 2023-10-15 RX ADMIN — LEVOTHYROXINE SODIUM 25 MCG: 0.03 TABLET ORAL at 09:21

## 2023-10-15 RX ADMIN — POLYETHYLENE GLYCOL (3350) 17 G: 17 POWDER, FOR SOLUTION ORAL at 09:21

## 2023-10-15 RX ADMIN — Medication 3 MG: at 20:41

## 2023-10-15 RX ADMIN — PROPRANOLOL HYDROCHLORIDE 20 MG: 10 TABLET ORAL at 09:19

## 2023-10-15 RX ADMIN — TIMOLOL MALEATE 1 DROP: 5 SOLUTION/ DROPS OPHTHALMIC at 09:23

## 2023-10-15 RX ADMIN — ACETAMINOPHEN 650 MG: 325 TABLET ORAL at 14:01

## 2023-10-15 RX ADMIN — BRIMONIDINE TARTRATE 1 DROP: 2 SOLUTION/ DROPS OPHTHALMIC at 06:25

## 2023-10-15 RX ADMIN — TAMSULOSIN HYDROCHLORIDE 0.4 MG: 0.4 CAPSULE ORAL at 20:41

## 2023-10-15 RX ADMIN — CLOTRIMAZOLE AND BETAMETHASONE DIPROPIONATE 1 APPLICATION: 10; .64 CREAM TOPICAL at 20:41

## 2023-10-15 RX ADMIN — SERTRALINE HYDROCHLORIDE 50 MG: 50 TABLET ORAL at 09:18

## 2023-10-15 RX ADMIN — PROPRANOLOL HYDROCHLORIDE 20 MG: 10 TABLET ORAL at 20:41

## 2023-10-15 RX ADMIN — QUETIAPINE FUMARATE 25 MG: 25 TABLET ORAL at 20:41

## 2023-10-15 ASSESSMENT — COGNITIVE AND FUNCTIONAL STATUS - GENERAL
EATING MEALS: A LITTLE
TURNING FROM BACK TO SIDE WHILE IN FLAT BAD: A LITTLE
DRESSING REGULAR LOWER BODY CLOTHING: A LOT
PERSONAL GROOMING: A LITTLE
CLIMB 3 TO 5 STEPS WITH RAILING: A LOT
TOILETING: A LITTLE
DAILY ACTIVITIY SCORE: 17
MOVING TO AND FROM BED TO CHAIR: A LITTLE
WALKING IN HOSPITAL ROOM: A LOT
TURNING FROM BACK TO SIDE WHILE IN FLAT BAD: A LITTLE
TOILETING: A LITTLE
WALKING IN HOSPITAL ROOM: A LITTLE
HELP NEEDED FOR BATHING: A LITTLE
CLIMB 3 TO 5 STEPS WITH RAILING: A LOT
MOBILITY SCORE: 17
STANDING UP FROM CHAIR USING ARMS: A LOT
MOVING TO AND FROM BED TO CHAIR: A LITTLE
HELP NEEDED FOR BATHING: A LITTLE
PERSONAL GROOMING: A LITTLE
DRESSING REGULAR UPPER BODY CLOTHING: A LITTLE
STANDING UP FROM CHAIR USING ARMS: A LOT
MOBILITY SCORE: 16
DAILY ACTIVITIY SCORE: 17
EATING MEALS: A LITTLE
DRESSING REGULAR LOWER BODY CLOTHING: A LOT
DRESSING REGULAR UPPER BODY CLOTHING: A LITTLE

## 2023-10-15 ASSESSMENT — PAIN SCALES - GENERAL
PAINLEVEL_OUTOF10: 0 - NO PAIN
PAINLEVEL_OUTOF10: 3
PAINLEVEL_OUTOF10: 0 - NO PAIN
PAINLEVEL_OUTOF10: 0 - NO PAIN

## 2023-10-15 ASSESSMENT — PAIN - FUNCTIONAL ASSESSMENT: PAIN_FUNCTIONAL_ASSESSMENT: 0-10

## 2023-10-15 NOTE — CARE PLAN
The patient's goals for the shift include Pt will be comfortable throughout shift.    The clinical goals for the shift include Patient will have no complaints of anxiety through shift today

## 2023-10-15 NOTE — PROGRESS NOTES
Qi Davila is a 82 y.o. female on day 13 of admission presenting with COVID-19.      Subjective   No new change ,overall stable       Objective        Vitals 24HR  Heart Rate:  [62-79]   Temp:  [36.4 °C (97.5 °F)-36.7 °C (98 °F)]   Resp:  [17-18]   BP: (138-157)/(54-82)   SpO2:  [95 %-98 %]     Intake/Output last 3 Shifts:    Intake/Output Summary (Last 24 hours) at 10/15/2023 1618  Last data filed at 10/15/2023 1311  Gross per 24 hour   Intake 1476 ml   Output 340 ml   Net 1136 ml       Physical Exam; patient is COVID-positive    Relevant Results     Results from last 7 days   Lab Units 10/10/23  0552 10/09/23  0635   WBC AUTO x10*3/uL 6.8 6.6   HEMOGLOBIN g/dL 12.1 12.5   HEMATOCRIT % 34.8* 35.0*   PLATELETS AUTO x10*3/uL 262 259      Results from last 7 days   Lab Units 10/15/23  1216 10/13/23  1533 10/12/23  0538   SODIUM mmol/L 130* 129* 132*   POTASSIUM mmol/L 4.0 3.5 3.8   CHLORIDE mmol/L 97* 98 102   CO2 mmol/L 27 25 25   BUN mg/dL 11 12 12   CREATININE mg/dL 0.41* 0.56 0.54   GLUCOSE mg/dL 100* 127* 92   CALCIUM mg/dL 8.5* 8.1* 8.0*        Current Facility-Administered Medications:     acetaminophen (Tylenol) tablet 650 mg, 650 mg, oral, q4h PRN, 650 mg at 10/15/23 1401 **OR** acetaminophen (Tylenol) oral liquid 650 mg, 650 mg, nasogastric tube, q4h PRN **OR** acetaminophen (Tylenol) suppository 650 mg, 650 mg, rectal, q4h PRN, Angel Drew MD    brimonidine (AlphaGAN P) 0.2 % ophthalmic solution 1 drop, 1 drop, Both Eyes, q12h, Angel Drew MD, 1 drop at 10/15/23 0625    clotrimazole-betamethasone (Lotrisone) cream 1 Application, 1 Application, Topical, BID, Angel Drew MD, 1 Application at 10/13/23 2047    cycloSPORINE (Restasis) 0.05 % ophthalmic emulsion 1 drop, 1 drop, Both Eyes, q12h, Angel Drew MD, 1 drop at 10/11/23 1800    enoxaparin (Lovenox) syringe 40 mg, 40 mg, subcutaneous, Daily, Angel Drew MD, 40 mg at 10/15/23 0922    fluticasone (Flonase) nasal spray 1 spray, 1 spray,  Each Nostril, Daily PRN, Angel Drew MD    latanoprost (Xalatan) 0.005 % ophthalmic solution 1 drop, 1 drop, Both Eyes, Nightly, Angel Drew MD, 1 drop at 10/14/23 2124    levothyroxine (Synthroid, Levoxyl) tablet 25 mcg, 25 mcg, oral, Daily, Angel Drew MD, 25 mcg at 10/15/23 0921    melatonin tablet 3 mg, 3 mg, oral, Nightly, Destini Ramirez PA-C, 3 mg at 10/14/23 2124    oxygen (O2) therapy, , inhalation, Continuous PRN - O2/gases, Angel Drew MD    pantoprazole (ProtoNix) EC tablet 20 mg, 20 mg, oral, Daily before breakfast, Angel Drew MD, 20 mg at 10/14/23 0627    polyethylene glycol (Glycolax, Miralax) packet 17 g, 17 g, oral, Daily, Angel Drew MD, 17 g at 10/15/23 0921    prochlorperazine (Compazine) injection 10 mg, 10 mg, intravenous, q6h PRN, Destini Ramirez PA-C, 10 mg at 10/13/23 1457    propranolol (Inderal) tablet 20 mg, 20 mg, oral, BID, Angel Drew MD, 20 mg at 10/15/23 0919    QUEtiapine (SEROquel) tablet 25 mg, 25 mg, oral, Nightly, Acacia Bowling MD, 25 mg at 10/14/23 2124    sertraline (Zoloft) tablet 50 mg, 50 mg, oral, Daily, Acacia Bowling MD, 50 mg at 10/15/23 0918    tamsulosin (Flomax) 24 hr capsule 0.4 mg, 0.4 mg, oral, Nightly, UMU Kim, 0.4 mg at 10/14/23 2124    timolol (Timoptic) 0.5 % ophthalmic solution 1 drop, 1 drop, Both Eyes, Daily, Angel Drew MD, 1 drop at 10/15/23 0923           Assessment/Plan              Principal Problem:    COVID-19  Active Problems:    Anxiety    Pancreatic lesion  Hyponatremia, stable  JENN on top of chronic kidney disease stable          I spent 20 minutes in the professional and overall care of this patient.      Terri Isaac MD

## 2023-10-15 NOTE — PROGRESS NOTES
INPATIENT PROGRESS NOTES    PRIMARY SERVICE: Angel Drew MD     10/15/2023  6:44 PM      INTERVAL HPI: Pt feels OK    No new complaints     Her de la garza has been removed 10/14/23  She is passing urine and her pvr is 80cc  Does have burning sensation in urine      MEDICATIONS:    No current facility-administered medications on file prior to encounter.     Current Outpatient Medications on File Prior to Encounter   Medication Sig Dispense Refill    busPIRone (Buspar) 5 mg tablet Take 1 tablet (5 mg) by mouth 2 times a day.      clobetasol (Temovate) 0.05 % external solution Apply 1 Application topically 2 times a day.      clotrimazole-betamethasone (Lotrisone) cream Apply 1 Application topically 2 times a day.      famciclovir (Famvir) 500 mg tablet Take 1 tablet (500 mg) by mouth 1 time if needed. With oubreak      latanoprost (Xalatan) 0.005 % ophthalmic solution Administer 1 drop into both eyes once daily at bedtime.      levothyroxine (Synthroid, Levoxyl) 25 mcg tablet Take 1 tablet (25 mcg) by mouth once daily.      propranolol (Inderal) 20 mg tablet Take 1 tablet (20 mg) by mouth 2 times a day.      Restasis 0.05 % ophthalmic emulsion Administer 1 drop into both eyes every 12 hours.      sertraline (Zoloft) 50 mg tablet Take 2 tablets (100 mg) by mouth once daily.      Alphagan P 0.1 % ophthalmic solution Administer 1 drop into both eyes every 12 hours.      ciclopirox 1 % shampoo Apply 1 Application topically once daily at bedtime.      dexlansoprazole (Dexilant) 30 mg DR capsule Take 1 capsule (30 mg) by mouth once daily.      fish oil concentrate (Omega-3) 120-180 mg capsule Take 1 capsule (1 g) by mouth once daily.      fluticasone (Flonase) 50 mcg/actuation nasal spray Administer 1 spray into each nostril once daily.      QUEtiapine (SEROquel) 50 mg tablet Take 1 tablet (50 mg) by mouth once daily at bedtime.      timolol (Timoptic) 0.5 % ophthalmic solution Administer 1 drop into both eyes once daily.            PHYSICAL EXAM:   Vitals:    10/15/23 0732 10/15/23 0919 10/15/23 1100 10/15/23 1700   BP: 138/82 155/74 156/70 137/80   BP Location: Right arm  Right arm Right arm   Patient Position: Lying  Lying Lying   Pulse: 64 79 66 72   Resp: 18  17 18   Temp: 36.5 °C (97.7 °F)  36.7 °C (98 °F) 36.7 °C (98.1 °F)   TempSrc: Temporal  Oral Oral   SpO2: 97%  98% 96%   Weight:       Height:            PHYSICAL EXAMINATION:    General appearance:  frail elderly   Skin: skin color, texture, turgor normal,   HEENT: Anicteric sclera.  Oropharynx mucosa moist  Neck: Supple, no adenopathy;   Back: no pain to palpation over spine or costovertebral angles,   Lungs: clear to auscultation, no wheezing or rhonchi  Heart: RRR without murmur, gallop, or rubs.   Abdomen: Abdomen soft, non-tender. Bowel sounds normal. No masses, organomegaly  Extremities: Extremities normal. No  edema, or skin discoloration.   Musculoskeletal: Spine range of motion normal. Muscular strength intact  Neuro: Oriented X  3    DATA: CBC, Coags, BMP, Mg, Phos   Results for orders placed or performed during the hospital encounter of 10/02/23 (from the past 96 hour(s))   Basic metabolic panel   Result Value Ref Range    Glucose 92 74 - 99 mg/dL    Sodium 132 (L) 136 - 145 mmol/L    Potassium 3.8 3.5 - 5.3 mmol/L    Chloride 102 98 - 107 mmol/L    Bicarbonate 25 21 - 32 mmol/L    Anion Gap 9 (L) 10 - 20 mmol/L    Urea Nitrogen 12 6 - 23 mg/dL    Creatinine 0.54 0.50 - 1.05 mg/dL    eGFR >90 >60 mL/min/1.73m*2    Calcium 8.0 (L) 8.6 - 10.3 mg/dL   Lavender Top   Result Value Ref Range    Extra Tube Hold for add-ons.    POCT GLUCOSE   Result Value Ref Range    POCT Glucose 122 (H) 74 - 99 mg/dL   Basic Metabolic Panel   Result Value Ref Range    Glucose 127 (H) 74 - 99 mg/dL    Sodium 129 (L) 136 - 145 mmol/L    Potassium 3.5 3.5 - 5.3 mmol/L    Chloride 98 98 - 107 mmol/L    Bicarbonate 25 21 - 32 mmol/L    Anion Gap 10 10 - 20 mmol/L    Urea Nitrogen 12 6 - 23  mg/dL    Creatinine 0.56 0.50 - 1.05 mg/dL    eGFR 90 >60 mL/min/1.73m*2    Calcium 8.1 (L) 8.6 - 10.3 mg/dL   POCT GLUCOSE   Result Value Ref Range    POCT Glucose 125 (H) 74 - 99 mg/dL   Basic metabolic panel   Result Value Ref Range    Glucose 100 (H) 74 - 99 mg/dL    Sodium 130 (L) 136 - 145 mmol/L    Potassium 4.0 3.5 - 5.3 mmol/L    Chloride 97 (L) 98 - 107 mmol/L    Bicarbonate 27 21 - 32 mmol/L    Anion Gap 10 10 - 20 mmol/L    Urea Nitrogen 11 6 - 23 mg/dL    Creatinine 0.41 (L) 0.50 - 1.05 mg/dL    eGFR >90 >60 mL/min/1.73m*2    Calcium 8.5 (L) 8.6 - 10.3 mg/dL           ASSESSMENT AND PLAN:     Principal Problem:    COVID-19  Active Problems:    Anxiety    Pancreatic lesion      #COVID-pneumonia  Completed 5 days of remdesivir  Denies any shortness of breath  ID signed off       #Depression with anxiety  Continue home medications  Can follow up w psyche as out patient      #Hyponatremia  Improving  Renal on board      #Hypokalemia     #Chronic Samson catheter  Samson removed  Cont to [facility]     #UTI (CAUTI)  treated     Hyponatremmia improving   Input from renal noted  Recheck tomorrow    Pt did have near fall today while going to toilet   She will benefit from rehab    PTOT      Dc planning   Plan of care discussed with: Provider, RN, Patient.      Angel Drew MD

## 2023-10-15 NOTE — PROGRESS NOTES
INPATIENT PROGRESS NOTES    PRIMARY SERVICE: Angel Drew MD     10/14/2023  1030 AM    INTERVAL HPI: Pt feels OK    No new complaints     PERTINENT ROS:  REVIEW OF SYSTEMS  GENERAL: negative for fever, SEE HPI  RESPIRATORY: Negative for cough, wheezing or shortness of breath.  CARDIOVASCULAR: Negative for chest pain, leg swelling or palpitations.  GI: Negative for abdominal discomfort, blood in stools or black stools or change in bowel habits  NEURO: no change per nursing  All other reviewed and negative other than HPI.      MEDICATIONS:    No current facility-administered medications on file prior to encounter.     Current Outpatient Medications on File Prior to Encounter   Medication Sig Dispense Refill    busPIRone (Buspar) 5 mg tablet Take 1 tablet (5 mg) by mouth 2 times a day.      clobetasol (Temovate) 0.05 % external solution Apply 1 Application topically 2 times a day.      clotrimazole-betamethasone (Lotrisone) cream Apply 1 Application topically 2 times a day.      famciclovir (Famvir) 500 mg tablet Take 1 tablet (500 mg) by mouth 1 time if needed. With oubreak      latanoprost (Xalatan) 0.005 % ophthalmic solution Administer 1 drop into both eyes once daily at bedtime.      levothyroxine (Synthroid, Levoxyl) 25 mcg tablet Take 1 tablet (25 mcg) by mouth once daily.      propranolol (Inderal) 20 mg tablet Take 1 tablet (20 mg) by mouth 2 times a day.      Restasis 0.05 % ophthalmic emulsion Administer 1 drop into both eyes every 12 hours.      sertraline (Zoloft) 50 mg tablet Take 2 tablets (100 mg) by mouth once daily.      Alphagan P 0.1 % ophthalmic solution Administer 1 drop into both eyes every 12 hours.      ciclopirox 1 % shampoo Apply 1 Application topically once daily at bedtime.      dexlansoprazole (Dexilant) 30 mg DR capsule Take 1 capsule (30 mg) by mouth once daily.      fish oil concentrate (Omega-3) 120-180 mg capsule Take 1 capsule (1 g) by mouth once daily.      fluticasone (Flonase)  50 mcg/actuation nasal spray Administer 1 spray into each nostril once daily.      QUEtiapine (SEROquel) 50 mg tablet Take 1 tablet (50 mg) by mouth once daily at bedtime.      timolol (Timoptic) 0.5 % ophthalmic solution Administer 1 drop into both eyes once daily.           PHYSICAL EXAM:   Vitals:    10/14/23 0746 10/14/23 1126 10/14/23 1738 10/14/23 1929   BP: 179/69 157/75 151/69 142/54   BP Location: Left arm Right arm Right arm Right arm   Patient Position: Lying Lying Sitting Lying   Pulse: 68 70 77 66   Resp: 18 18 18 18   Temp: 36.5 °C (97.7 °F) 36.6 °C (97.8 °F) 36.6 °C (97.8 °F) 36.4 °C (97.5 °F)   TempSrc: Axillary Oral Axillary Oral   SpO2: 93% 94% 96%    Weight:       Height:            PHYSICAL EXAMINATION:    General appearance: thin frail elderly   Skin: skin color, texture, turgor normal,   HEENT: Anicteric sclera.  Oropharynx mucosa moist  Neck: Supple, no adenopathy;   Back: no pain to palpation over spine or costovertebral angles,   Lungs: clear to auscultation, no wheezing or rhonchi  Heart: RRR without murmur, gallop, or rubs.   Abdomen: Abdomen soft, non-tender. Bowel sounds normal. No masses, organomegaly   does have chr de la garza   Extremities: Extremities normal. No  edema, or skin discoloration.   Musculoskeletal: Spine range of motion normal. Muscular strength intact  Neuro: Oriented X  3    DATA: CBC, Coags, BMP, Mg, Phos   Results for orders placed or performed during the hospital encounter of 10/02/23 (from the past 96 hour(s))   Basic metabolic panel   Result Value Ref Range    Glucose 108 (H) 74 - 99 mg/dL    Sodium 132 (L) 136 - 145 mmol/L    Potassium 3.7 3.5 - 5.3 mmol/L    Chloride 101 98 - 107 mmol/L    Bicarbonate 25 21 - 32 mmol/L    Anion Gap 10 10 - 20 mmol/L    Urea Nitrogen 13 6 - 23 mg/dL    Creatinine 0.49 (L) 0.50 - 1.05 mg/dL    eGFR >90 >60 mL/min/1.73m*2    Calcium 8.2 (L) 8.6 - 10.3 mg/dL   Urine culture    Specimen: Indwelling (De La Garza) Catheter; Urine   Result Value  Ref Range    Urine Culture No significant growth    Renal function panel   Result Value Ref Range    Glucose 104 (H) 74 - 99 mg/dL    Sodium 131 (L) 136 - 145 mmol/L    Potassium 3.7 3.5 - 5.3 mmol/L    Chloride 98 98 - 107 mmol/L    Bicarbonate 27 21 - 32 mmol/L    Anion Gap 10 10 - 20 mmol/L    Urea Nitrogen 14 6 - 23 mg/dL    Creatinine 0.63 0.50 - 1.05 mg/dL    eGFR 88 >60 mL/min/1.73m*2    Calcium 8.5 (L) 8.6 - 10.3 mg/dL    Phosphorus 3.8 2.5 - 4.9 mg/dL    Albumin 3.5 3.4 - 5.0 g/dL   Basic metabolic panel   Result Value Ref Range    Glucose 92 74 - 99 mg/dL    Sodium 132 (L) 136 - 145 mmol/L    Potassium 3.8 3.5 - 5.3 mmol/L    Chloride 102 98 - 107 mmol/L    Bicarbonate 25 21 - 32 mmol/L    Anion Gap 9 (L) 10 - 20 mmol/L    Urea Nitrogen 12 6 - 23 mg/dL    Creatinine 0.54 0.50 - 1.05 mg/dL    eGFR >90 >60 mL/min/1.73m*2    Calcium 8.0 (L) 8.6 - 10.3 mg/dL   Lavender Top   Result Value Ref Range    Extra Tube Hold for add-ons.    POCT GLUCOSE   Result Value Ref Range    POCT Glucose 122 (H) 74 - 99 mg/dL   Basic Metabolic Panel   Result Value Ref Range    Glucose 127 (H) 74 - 99 mg/dL    Sodium 129 (L) 136 - 145 mmol/L    Potassium 3.5 3.5 - 5.3 mmol/L    Chloride 98 98 - 107 mmol/L    Bicarbonate 25 21 - 32 mmol/L    Anion Gap 10 10 - 20 mmol/L    Urea Nitrogen 12 6 - 23 mg/dL    Creatinine 0.56 0.50 - 1.05 mg/dL    eGFR 90 >60 mL/min/1.73m*2    Calcium 8.1 (L) 8.6 - 10.3 mg/dL   POCT GLUCOSE   Result Value Ref Range    POCT Glucose 125 (H) 74 - 99 mg/dL           ASSESSMENT AND PLAN:     Principal Problem:    COVID-19  Active Problems:    Anxiety    Pancreatic lesion      #COVID-pneumonia  Completed 5 days of remdesivir  Denies any shortness of breath  May come out of isolation if OK with infection control nurse  ID signed off       #Depression with anxiety  Continue home medications  Can follow up w psyche as out patient      #Hyponatremia  Improving  Renal on board      #Hypokalemia     #Chronic Samson  "catheter  Exchange De La Garza catheter >> this was done yesterday, pt upset she \"had to have it done\"   Continue Flomax    #UTI (CAUTI)  C/wIV antibiotics  Urine culture  pending >. Not clear if ever sent   Dc rocephin  Remove de la garza      Hyponatremmia improving   Input from renal noted    PTOT      Dc planning   Plan of care discussed with: Provider, RN, Patient.      Angel Drew MD    "

## 2023-10-16 LAB
ANION GAP SERPL CALC-SCNC: 10 MMOL/L (ref 10–20)
BUN SERPL-MCNC: 11 MG/DL (ref 6–23)
CALCIUM SERPL-MCNC: 8.4 MG/DL (ref 8.6–10.3)
CHLORIDE SERPL-SCNC: 98 MMOL/L (ref 98–107)
CO2 SERPL-SCNC: 26 MMOL/L (ref 21–32)
CREAT SERPL-MCNC: 0.42 MG/DL (ref 0.5–1.05)
GFR SERPL CREATININE-BSD FRML MDRD: >90 ML/MIN/1.73M*2
GLUCOSE SERPL-MCNC: 87 MG/DL (ref 74–99)
HOLD SPECIMEN: NORMAL
POTASSIUM SERPL-SCNC: 3.8 MMOL/L (ref 3.5–5.3)
SODIUM SERPL-SCNC: 130 MMOL/L (ref 136–145)

## 2023-10-16 PROCEDURE — 1100000001 HC PRIVATE ROOM DAILY

## 2023-10-16 PROCEDURE — 96372 THER/PROPH/DIAG INJ SC/IM: CPT | Performed by: FAMILY MEDICINE

## 2023-10-16 PROCEDURE — 97535 SELF CARE MNGMENT TRAINING: CPT | Mod: GO

## 2023-10-16 PROCEDURE — 2500000001 HC RX 250 WO HCPCS SELF ADMINISTERED DRUGS (ALT 637 FOR MEDICARE OP): Performed by: PHYSICIAN ASSISTANT

## 2023-10-16 PROCEDURE — 2500000004 HC RX 250 GENERAL PHARMACY W/ HCPCS (ALT 636 FOR OP/ED): Performed by: PHYSICIAN ASSISTANT

## 2023-10-16 PROCEDURE — 97530 THERAPEUTIC ACTIVITIES: CPT | Mod: GP

## 2023-10-16 PROCEDURE — 2500000001 HC RX 250 WO HCPCS SELF ADMINISTERED DRUGS (ALT 637 FOR MEDICARE OP): Performed by: FAMILY MEDICINE

## 2023-10-16 PROCEDURE — 36415 COLL VENOUS BLD VENIPUNCTURE: CPT | Performed by: FAMILY MEDICINE

## 2023-10-16 PROCEDURE — 97112 NEUROMUSCULAR REEDUCATION: CPT | Mod: GP

## 2023-10-16 PROCEDURE — 2500000004 HC RX 250 GENERAL PHARMACY W/ HCPCS (ALT 636 FOR OP/ED): Performed by: FAMILY MEDICINE

## 2023-10-16 PROCEDURE — 2500000004 HC RX 250 GENERAL PHARMACY W/ HCPCS (ALT 636 FOR OP/ED): Performed by: PSYCHIATRY & NEUROLOGY

## 2023-10-16 PROCEDURE — 80048 BASIC METABOLIC PNL TOTAL CA: CPT | Performed by: FAMILY MEDICINE

## 2023-10-16 PROCEDURE — 97116 GAIT TRAINING THERAPY: CPT | Mod: GP,59

## 2023-10-16 PROCEDURE — 2500000004 HC RX 250 GENERAL PHARMACY W/ HCPCS (ALT 636 FOR OP/ED): Performed by: NURSE PRACTITIONER

## 2023-10-16 RX ORDER — ALPRAZOLAM 0.5 MG/1
0.5 TABLET ORAL ONCE
Status: COMPLETED | OUTPATIENT
Start: 2023-10-16 | End: 2023-10-16

## 2023-10-16 RX ADMIN — Medication 3 MG: at 21:05

## 2023-10-16 RX ADMIN — CLOTRIMAZOLE AND BETAMETHASONE DIPROPIONATE 1 APPLICATION: 10; .64 CREAM TOPICAL at 09:00

## 2023-10-16 RX ADMIN — PROPRANOLOL HYDROCHLORIDE 20 MG: 10 TABLET ORAL at 09:36

## 2023-10-16 RX ADMIN — CYCLOSPORINE 1 DROP: 0.5 EMULSION OPHTHALMIC at 17:45

## 2023-10-16 RX ADMIN — SERTRALINE HYDROCHLORIDE 50 MG: 50 TABLET ORAL at 09:36

## 2023-10-16 RX ADMIN — BRIMONIDINE TARTRATE 1 DROP: 2 SOLUTION/ DROPS OPHTHALMIC at 17:45

## 2023-10-16 RX ADMIN — LEVOTHYROXINE SODIUM 25 MCG: 0.03 TABLET ORAL at 09:36

## 2023-10-16 RX ADMIN — LATANOPROST 1 DROP: 50 SOLUTION OPHTHALMIC at 21:05

## 2023-10-16 RX ADMIN — TIMOLOL MALEATE 1 DROP: 5 SOLUTION/ DROPS OPHTHALMIC at 09:00

## 2023-10-16 RX ADMIN — PROPRANOLOL HYDROCHLORIDE 20 MG: 10 TABLET ORAL at 21:05

## 2023-10-16 RX ADMIN — PANTOPRAZOLE SODIUM 20 MG: 20 TABLET, DELAYED RELEASE ORAL at 09:36

## 2023-10-16 RX ADMIN — QUETIAPINE FUMARATE 25 MG: 25 TABLET ORAL at 21:05

## 2023-10-16 RX ADMIN — ALPRAZOLAM 0.5 MG: 0.5 TABLET ORAL at 22:32

## 2023-10-16 RX ADMIN — ENOXAPARIN SODIUM 40 MG: 40 INJECTION SUBCUTANEOUS at 09:00

## 2023-10-16 RX ADMIN — TAMSULOSIN HYDROCHLORIDE 0.4 MG: 0.4 CAPSULE ORAL at 21:05

## 2023-10-16 RX ADMIN — CLOTRIMAZOLE AND BETAMETHASONE DIPROPIONATE 1 APPLICATION: 10; .64 CREAM TOPICAL at 21:05

## 2023-10-16 RX ADMIN — PROCHLORPERAZINE EDISYLATE 10 MG: 5 INJECTION INTRAMUSCULAR; INTRAVENOUS at 09:04

## 2023-10-16 RX ADMIN — POLYETHYLENE GLYCOL (3350) 17 G: 17 POWDER, FOR SOLUTION ORAL at 09:00

## 2023-10-16 RX ADMIN — ACETAMINOPHEN 650 MG: 325 TABLET ORAL at 13:46

## 2023-10-16 RX ADMIN — BRIMONIDINE TARTRATE 1 DROP: 2 SOLUTION/ DROPS OPHTHALMIC at 05:15

## 2023-10-16 ASSESSMENT — ACTIVITIES OF DAILY LIVING (ADL): HOME_MANAGEMENT_TIME_ENTRY: 31

## 2023-10-16 ASSESSMENT — PAIN SCALES - GENERAL
PAINLEVEL_OUTOF10: 0 - NO PAIN
PAINLEVEL_OUTOF10: 0 - NO PAIN
PAINLEVEL_OUTOF10: 2
PAINLEVEL_OUTOF10: 0 - NO PAIN

## 2023-10-16 ASSESSMENT — COGNITIVE AND FUNCTIONAL STATUS - GENERAL
TOILETING: A LITTLE
MOBILITY SCORE: 18
PERSONAL GROOMING: A LITTLE
MOVING FROM LYING ON BACK TO SITTING ON SIDE OF FLAT BED WITH BEDRAILS: A LITTLE
STANDING UP FROM CHAIR USING ARMS: A LITTLE
CLIMB 3 TO 5 STEPS WITH RAILING: A LITTLE
MOVING TO AND FROM BED TO CHAIR: A LITTLE
TURNING FROM BACK TO SIDE WHILE IN FLAT BAD: A LITTLE
HELP NEEDED FOR BATHING: A LITTLE
DAILY ACTIVITIY SCORE: 19
WALKING IN HOSPITAL ROOM: A LITTLE
DRESSING REGULAR LOWER BODY CLOTHING: A LITTLE
DRESSING REGULAR UPPER BODY CLOTHING: A LITTLE

## 2023-10-16 ASSESSMENT — PAIN - FUNCTIONAL ASSESSMENT
PAIN_FUNCTIONAL_ASSESSMENT: 0-10

## 2023-10-16 NOTE — PROGRESS NOTES
Care Coordinator Note:  Qi Davila is a 82 y.o. female on day 14 of admission presenting with COVID-19.  Patient would like rehab before returning home with homecare.    Peer to peer was to be performed and MD is to call insurance.  SNF bed at [facility] is not available at this time and another bed will open up mid-week.  Call placed to daughter to update on plan and if the plan is to go home with homecare, patient is not sure how she will get home.  Message left with request to return call to [facility].  Anya Hall RN [facility]    10/16/23 9654  Spoke to daughter, Paola, via phone.  She can pick her mom up tomorrow with the plan of [facility] for a RN, PT, OT, HHA and a list for private duty help.  Secure chat to MD regarding the above plan.  Anya Hall RN [facility]

## 2023-10-16 NOTE — PROGRESS NOTES
Qi Davila is a 82 y.o. female on day 14 of admission presenting with COVID-19.  She has a history of depression and anxiety, suicidal ideation, GERD, obstructive sleep apnea with chronic hyponatremia.  She presented on October 2 with headache and suicidal ideation.  She was COVID-positive, required oxygen and was seen by infectious disease, placed on remdesivir.  Sodium was 124 mEq/L.  She is on a SSRI.  Low uric acid of 2.2 prior.  TSH was normal, no evidence of adrenal insufficiency.  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  Sodium was in the low 120s and was felt that she was asymptomatic, she was given a dose of tolvaptan.  Sodium was then trending above 130, she was placed on a fluid restriction.    Subjective   Seen and examined.  Sitting up in a chair. Reports a healthy appetite.   No acute events, does not offer specific complaints.       Objective      Vitals 24HR  Heart Rate:  [66-80]   Temp:  [36.2 °C (97.2 °F)-36.7 °C (98.1 °F)]   Resp:  [17-18]   BP: (125-167)/(77-94)   SpO2:  [94 %-97 %]     Intake/Output last 3 Shifts:    Intake/Output Summary (Last 24 hours) at 10/16/2023 1121  Last data filed at 10/15/2023 2200  Gross per 24 hour   Intake 600 ml   Output 230 ml   Net 370 ml         Physical Exam  Constitutional: Elderly female, alert and oriented x3 in no acute distress  Eyes: Normal external exam.   Ears, Nose, Mouth, and Throat: External inspection of ears and nose: Normal.    Neck: No neck mass was observed. Supple. Thyroid not enlarged.  Cardiovascular: Normal heart rate and rhythm, normal S1 and S2, no gallops, no murmurs and no pericardial rub.   Pulmonary: No respiratory distress. Clear bilateral breath sounds.   Abdomen: Soft nontender; no abdominal mass palpated. Normal bowel sounds.   Musculoskeletal: No joint swelling   Skin: Warm and dry  Neurologic: Cranial nerves 2-12 grossly intact. Non focal  Lymphatic: No cervical lymphadenopathy.   Relevant Results  Results  from last 7 days   Lab Units 10/10/23  0552   WBC AUTO x10*3/uL 6.8   HEMOGLOBIN g/dL 12.1   HEMATOCRIT % 34.8*   PLATELETS AUTO x10*3/uL 262       Results from last 7 days   Lab Units 10/16/23  0528 10/15/23  1216 10/13/23  1533   SODIUM mmol/L 130* 130* 129*   POTASSIUM mmol/L 3.8 4.0 3.5   CHLORIDE mmol/L 98 97* 98   CO2 mmol/L 26 27 25   BUN mg/dL 11 11 12   CREATININE mg/dL 0.42* 0.41* 0.56   GLUCOSE mg/dL 87 100* 127*   CALCIUM mg/dL 8.4* 8.5* 8.1*         XR chest 2 views   Final Result   Cardiomegaly with diffuse interstitial pulmonary opacities suggestive   of pulmonary edema. Superimposed infection not excluded.        Signed by: Stacey Howell 10/2/2023 12:17 PM   Dictation workstation:   [id number]        Scheduled medications  brimonidine, 1 drop, Both Eyes, q12h  clotrimazole-betamethasone, 1 Application, Topical, BID  cycloSPORINE, 1 drop, Both Eyes, q12h  enoxaparin, 40 mg, subcutaneous, Daily  latanoprost, 1 drop, Both Eyes, Nightly  levothyroxine, 25 mcg, oral, Daily  melatonin, 3 mg, oral, Nightly  pantoprazole, 20 mg, oral, Daily before breakfast  polyethylene glycol, 17 g, oral, Daily  propranolol, 20 mg, oral, BID  QUEtiapine, 25 mg, oral, Nightly  sertraline, 50 mg, oral, Daily  tamsulosin, 0.4 mg, oral, Nightly  timolol, 1 drop, Both Eyes, Daily      Continuous medications     PRN medications  PRN medications: acetaminophen **OR** acetaminophen **OR** acetaminophen, fluticasone, oxygen, prochlorperazine         Assessment/Plan      Qi Davila is a 82 y.o. female with a past medical history of headache,  depression, anxiety, suicidal ideation, arthritis, GERD, glaucoma, thyroid disorder, obstructive sleep apnea and chronic hyponatremia who presented with headache and suicidal ideation on 10/2.  She was found to be COVID-positive.  She has required supplemental O2.  Was seen by infectious disease and placed on remdesivir.  She was found to have a serum sodium of 124 mmol/a liter with  normal renal function.  Review of her labs shows chronic hyponatremia.  She is on an SSRI.  Prior hyponatremia work-up is noted with a low uric acid of 2.2, TSH is normal, no cortisol albeit blood pressures are generous.  Her serum osmolality is 259, urine osmolality of 450 with a spot urine sodium of 18.  She remains in COVID isolation.  Nephrology is consulted for renal care.     She has a history of depression and anxiety, suicidal ideation, GERD, obstructive sleep apnea with chronic hyponatremia.  She presented on October 2 with headache and suicidal ideation.  She was COVID-positive, required oxygen and was seen by infectious disease, placed on remdesivir.  Sodium was 124 mEq/L.  She is on a SSRI.  Low uric acid of 2.2 prior.  TSH was normal, no evidence of adrenal insufficiency.  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  Sodium was in the low 120s and was felt that she was asymptomatic, she was given a dose of tolvaptan.  Sodium was then trending above 130, she was placed on a fluid restriction.    Her intake looks to be okay, blood pressures are not low and I need to be careful with sodium.  But I told her her sodium intake can be slightly above what is typically recommended but that she must limit her fluid intake.  I am afraid she is at risk for readmissions, and may benefit from low-dose tolvaptan every other day as an outpatient but this can be challenging to obtain.  We cannot hold her Zoloft as she has been on it for a decade and has a history of suicidal ideation.  Dr. Fernandez will see her as an outpatient, please draw a renal function panel for him 3 days after discharge and have it faxed to [phone].     Principal Problem:    COVID-19  Active Problems:    Anxiety    Pancreatic lesion            I spent 35 minutes in the professional and overall care of this patient.      Benjy Rocha MD

## 2023-10-16 NOTE — PROGRESS NOTES
Occupational Therapy    Occupational Therapy Treatment    Name: Qi Davila  MRN: [id number]  : 1940  Date: 10/16/23  Time Calculation  Start Time: 1015  Stop Time: 1046  Time Calculation (min): 31 min    Assessment:  OT Assessment: Pt seen for OT tx, continues with fatigue with activity assist wit ADLS and mobility  Prognosis: Good  Barriers to Discharge: None  Evaluation/Treatment Tolerance: Patient limited by fatigue  Medical Staff Made Aware: Yes  End of Session Communication: Bedside nurse  End of Session Patient Position: Bed, 3 rail up, Alarm off, not on at start of session  Plan:  Treatment Interventions: ADL retraining, Functional transfer training, Endurance training  OT Frequency: 3 times per week  OT Discharge Recommendations: Moderate intensity level of continued care  Equipment Recommended upon Discharge: Wheeled walker    Subjective   General:  OT Last Visit  OT Received On: 10/10/23  General  Reason for Referral: Weakness due to COVID+  Referred By: Rajendra  Past Medical History Relevant to Rehab: Pt is an 82 year old female presenting with abdominal pain, cough, fatigue, anxiety, and reported suicidal ideation. Pt seen in ED and did test positive for COVID. She has never been vaccinated.  Pt started on decadron and admitted. Did get dose of remdesivir in the ED. Hx of HTN, HLD, urinary retention, anxiety/depression.  Missed Visit: No  Missed Visit Reason: Patient refused  Prior to Session Communication: Bedside nurse  Patient Position Received: Bed, 3 rail up (sitting on EOB)  General Comment: pt lcleared by nursing notified by PT, pt waiting for OT to come in  Vitals:     Pain Assessment:  Pain Assessment  Pain Assessment: 0-10  Pain Score: 0 - No pain     Objective   Activities of Daily Living: LE Dressing  LE Dressing: Yes  Pants Level of Assistance: Close supervision  Shoe Level of Assistance: Distant supervision  LE Dressing Where Assessed: Edge of bed  LE Dressing Comments: able to  pull on pants with SBA due to fatigue and balance    Toileting  Toileting Level of Assistance: Close supervision  Where Assessed: Toilet  Toileting Comments: completed toilet tansfer and hygiene with close supervision/SBA utilized grab bar for transfers    Functional Standing Tolerance:     Bed Mobility/Transfers: Bed Mobility  Bed Mobility: Yes  Bed Mobility 1  Bed Mobility 1: Supine to sitting  Level of Assistance 1: Minimum assistance  Bed Mobility Comments 1: pt getting out of bed on L side. Requires min A toget up unable to reach for bedrail to assist  Bed Mobility 2  Bed Mobility  2: Sitting to supine  Level of Assistance 2: Contact guard  Bed Mobility Comments 2: pt required assist getting legs into bed this date    Transfers  Transfer: Yes  Transfer 1  Technique 1: Sit to stand  Transfer Device 1: Walker  Transfer Level of Assistance 1: Close supervision  Trials/Comments 1: slight LOB with sit-stand while coming to standing which pt was able to correct  Transfers 2  Transfer From 2: Stand to  Transfer to 2: Toilet  Technique 2: Stand to sit  Transfer Device 2: Walker  Transfer Level of Assistance 2: Close supervision  Trials/Comments 2: pt required close supervision and grab bar for transfer to toilet  Transfers 3  Transfer From 3: Toilet to  Transfer to 3: Stand  Technique 3: Stand to sit (on commode)  Transfer Device 3: Walker  Transfer Level of Assistance 3: Close supervision  Trials/Comments 3: required close supervision and grab bar to come to standing from toilet  Transfers 4  Technique 4: Stand to sit  Transfer Device 4: Walker  Transfer Level of Assistance 4: Close supervision, Minimal verbal cues    Toilet Transfers  Toilet Transfer From: Walker  Toilet Transfer Type: To and from  Toilet Transfer to: Standard toilet  Toilet Transfers: Contact guard  Toilet Transfers Comments: utilized grab bars for toilet transfers     Therapy/Activity: Therapeutic Activity  Therapeutic Activity Performed:  Yes  Therapeutic Activity 1: pt ambulated in room to BR with CGA and wheeled walker. pt expressed anxiety if at home getting up in the middle of the night. fearful to ambulate to BR due to dark out and feels fearful to fall     Strength:  Strength  Strength Comments: WFL        Outcome Measures:  [facility] Daily Activity  Putting on and taking off regular lower body clothing: A little  Bathing (including washing, rinsing, drying): A little  Putting on and taking off regular upper body clothing: A little  Toileting, which includes using toilet, bedpan or urinal: A little  Taking care of personal grooming such as brushing teeth: A little  Eating Meals: None  Daily Activity - Total Score: 19        Education Documentation  Precautions, taught by Kaylee You OT at 10/16/2023 11:54 AM.  Learner: Patient  Readiness: Acceptance  Method: Explanation  Response: Verbalizes Understanding, Needs Reinforcement    Home Exercise Program, taught by Kaylee You OT at 10/16/2023 11:54 AM.  Learner: Patient  Readiness: Acceptance  Method: Explanation  Response: Verbalizes Understanding, Needs Reinforcement    ADL Training, taught by Kaylee You OT at 10/16/2023 11:54 AM.  Learner: Patient  Readiness: Acceptance  Method: Explanation  Response: Verbalizes Understanding, Needs Reinforcement    Education Comments  No comments found.      Goals:  Encounter Problems       Encounter Problems (Active)       ADLs       Patient will perform UB and LB bathing 75% with modified independent level of assistance and adaptive equipment prn  (Progressing)       Start:  10/10/23    Expected End:  10/24/23            Patient with complete lower body dressing with minimal assist  level of assistance donning and doffing all LE clothes  with PRN adaptive equipment while supported sitting (Progressing)       Start:  10/10/23    Expected End:  10/24/23            Patient will complete toileting including hygiene clothing management/hygiene  with modified independent level of assistance and Adaptive equipment prn  (Progressing)       Start:  10/10/23    Expected End:  10/24/23               Balance       STG - Maintains dynamic standing balance with upper extremity support (Progressing)       Start:  10/09/23    Expected End:  10/23/23       INTERVENTIONS:  1. Practice standing with minimal support.  2. Educate patient about standing tolerance.  3. Educate patient about independence with gait, transfers, and ADL's.  4. Educate patient about use of assistive device.  5. Educate patient about self-directed care.            COGNITION/SAFETY       Pt will recall events of tx session with 90% accuracy with cues prn  (Progressing)       Start:  10/10/23    Expected End:  10/24/23               Mobility       STG - Patient will ambulate (Progressing)       Start:  10/09/23    Expected End:  10/23/23       >/= 75', device PRN, MOD I, no LOB               Safety       Goal 1 (Progressing)       Start:  10/09/23    Expected End:  10/23/23       Pt will verbalize and apply safety awareness and precautions 100% of time throughout entire session               TRANSFERS       Patient will complete sit to stand transfer with supervision level of assistance and least restrictive device in order to improve safety and prepare for out of bed mobility. (Progressing)       Start:  10/10/23    Expected End:  10/24/23               Transfers       STG - Patient will perform bed mobility (Progressing)       Start:  10/09/23    Expected End:  10/23/23       At MOD I level, safely, no LOB         STG - Patient will transfer sit to and from stand (Progressing)       Start:  10/09/23    Expected End:  10/23/23       At MOD I level, safely, no LOB

## 2023-10-16 NOTE — PROGRESS NOTES
Physical Therapy    Physical Therapy Treatment    Patient Name: Qi Davila  MRN: [id number]  Today's Date: 10/16/2023  Time Calculation  Start Time: 0940  Stop Time: 1020  Time Calculation (min): 40 min       Assessment/Plan   PT Assessment  PT Assessment Results: Decreased strength, Decreased endurance, Impaired balance, Decreased mobility, Decreased cognition, Impaired judgement, Decreased safety awareness  Rehab Prognosis: Fair  Evaluation/Treatment Tolerance: Patient limited by fatigue, Patient tolerated treatment well  Medical Staff Made Aware: Yes  End of Session Communication: Bedside nurse, Care Coordinator  Assessment Comment: PT tx complete. Pt was COVID+, restrictions lifted earlier this admission. Moving fairly well other than endurance and pt reported RODRIGUEZ /c ambulation in hallway. Pt willing to participate and wants to be up and moving. Fearful of being without assist, reports needing care at 0300 when wakes from sleep. Needing continued PT for addressing functional deficits.  End of Session Patient Position: Bed, 3 rail up, Alarm off, caregiver present (Sitting up at EOB, CTA and OT entering room for care/assist)  PT Plan  Inpatient/Swing Bed or Outpatient: Inpatient  PT Plan  Treatment/Interventions: Bed mobility, Transfer training, Gait training, Balance training, Neuromuscular re-education, Endurance training, Home exercise program, Positioning  PT Plan: Skilled PT  PT Frequency: 3 times per week  PT Discharge Recommendations: Low intensity level of continued care, 24 hr supervision due to cognition  Equipment Recommended upon Discharge: Wheeled walker  PT Recommended Transfer Status: Stand by assist, Assist x1      General Visit Information:   PT  Visit  PT Received On: 10/16/23  Response to Previous Treatment: Patient reporting fatigue but able to participate. (Reports fear, dizziness, nausea, and general need for companionship, but willing to participate)  General  Prior to Session  Communication: Bedside nurse  Patient Position Received: Bed, 3 rail up, Alarm on  General Comment: Cleared for participation, TCC requesting early note for attempting insurance appeal    Subjective   Precautions:  Precautions  Medical Precautions: Fall precautions (Recent COVID+, no longer on precautions)  Precautions Comment: Mild confusion  Vital Signs:       Objective   Pain:  Pain Assessment  Pain Assessment: 0-10  Pain Score: 0 - No pain (No pain, just occasional nausea and occasional reported dizziness)  Cognition:  Cognition  Overall Cognitive Status: Within Functional Limits  Orientation Level: Oriented X4  Postural Control:  Postural Control  Postural Control: Within Functional Limits  Extremity/Trunk Assessments:  RUE   RUE : Within Functional Limits  LUE   LUE: Within Functional Limits  RLE   RLE : Within Functional Limits  LLE   LLE : Within Functional Limits  Activity Tolerance:  Activity Tolerance  Endurance: Tolerates 30 min exercise with multiple rests  Activity Tolerance Comments: Reports nausea and dizziness, but only in direct relation to fear of home going  Treatments:  Therapeutic Activity  Therapeutic Activity Performed: Yes  Therapeutic Activity 1: Increased focus on relaxation and breathing for anxiety and fear. Increased focus on upright posture and safety with mobility. Increased focus on independence with self care as pt wishing to toilet prior to ambulation.    Balance/Neuromuscular Re-Education  Balance/Neuromuscular Re-Education Activity Performed: Yes  Balance/Neuromuscular Re-Education Activity 1: Focus on EOB sitting as well as dynamic trunk stabilization while performing clothing adjustment, reaching tasks, leaning out of base of support, and reaching activities >/= 15 minutes    Bed Mobility  Bed Mobility: Yes  Bed Mobility 1  Bed Mobility 1: Supine to sitting  Level of Assistance 1: Minimum assistance  Bed Mobility Comments 1: Pt insistant unable to reach to handrail, wanting PT  hand for assist within reach. PT arm used as rail, no propulsion assist, but placed in easily reach for assist in trunk propulsion.  Bed Mobility 2  Bed Mobility  2: Sitting to supine  Level of Assistance 2: Close supervision  Bed Mobility Comments 2: More fluid movement, 1 trial for success, no hands on assist.    Ambulation/Gait Training  Ambulation/Gait Training Performed: Yes  Ambulation/Gait Training 1  Surface 1: Level tile  Device 1: Rolling walker  Assistance 1: Close supervision  Quality of Gait 1: Diminished heel strike (Wider SHAUNA, decreased foot clearance, slowed herberth, no LOB, focus on upright posture and gaze)  Comments/Distance (ft) 1: 15' x2, 100' x2  Transfers  Transfer: Yes  Transfer 1  Technique 1: Sit to stand  Transfer Device 1: Walker  Transfer Level of Assistance 1: Close supervision  Trials/Comments 1: No true hands on assist, slight bed height elevation, cues for hand placement, no LOB. Trial x4 throughout session, no hands on assist needed throughout.  Transfers 2  Technique 2: Stand to sit  Transfer Device 2: Walker  Transfer Level of Assistance 2: Close supervision  Trials/Comments 2: Cues for hand placement, eccentric control, and safe eccentric control, no true hands on assist (Trial x4)    Stairs  Stairs: No    Outcome Measures:  [facility] Basic Mobility  Turning from your back to your side while in a flat bed without using bedrails: A little  Moving from lying on your back to sitting on the side of a flat bed without using bedrails: A little  Moving to and from bed to chair (including a wheelchair): A little  Standing up from a chair using your arms (e.g. wheelchair or bedside chair): A little  To walk in hospital room: A little  Climbing 3-5 steps with railing: A little  Basic Mobility - Total Score: 18    Education Documentation  Precautions, taught by Luis A Fuller PT at 10/16/2023 10:46 AM.  Learner: Patient  Readiness: Eager  Method: Explanation, Demonstration  Response:  Verbalizes Understanding, Needs Reinforcement    Mobility Training, taught by Luis A Fuller, PT at 10/16/2023 10:46 AM.  Learner: Patient  Readiness: Eager  Method: Explanation, Demonstration  Response: Verbalizes Understanding, Needs Reinforcement    Education Comments  No comments found.        OP EDUCATION:       Encounter Problems       Encounter Problems (Active)       Balance       STG - Maintains dynamic standing balance with upper extremity support (Progressing)       Start:  10/09/23    Expected End:  10/23/23       INTERVENTIONS:  1. Practice standing with minimal support.  2. Educate patient about standing tolerance.  3. Educate patient about independence with gait, transfers, and ADL's.  4. Educate patient about use of assistive device.  5. Educate patient about self-directed care.            Mobility       STG - Patient will ambulate (Progressing)       Start:  10/09/23    Expected End:  10/23/23       >/= 75', device PRN, MOD I, no LOB               Pain - Adult          Safety       Goal 1 (Progressing)       Start:  10/09/23    Expected End:  10/23/23       Pt will verbalize and apply safety awareness and precautions 100% of time throughout entire session               Transfers       STG - Patient will perform bed mobility (Progressing)       Start:  10/09/23    Expected End:  10/23/23       At MOD I level, safely, no LOB         STG - Patient will transfer sit to and from stand (Progressing)       Start:  10/09/23    Expected End:  10/23/23       At MOD I level, safely, no LOB

## 2023-10-16 NOTE — CARE PLAN
The patient's goals for the shift include Pt will be comfortable throughout shift.    The clinical goals for the shift include Pt will remain hemodynamically stable throughout shift    Over the shift, the patient did not make progress toward the following goals. Barriers to progression include . Recommendations to address these barriers include .

## 2023-10-16 NOTE — PROGRESS NOTES
INPATIENT PROGRESS NOTES    PRIMARY SERVICE: Angel Drew MD     10/16/2023  11:19 AM      INTERVAL HPI: Pt feels OK    No new complaints       MEDICATIONS:    No current facility-administered medications on file prior to encounter.     Current Outpatient Medications on File Prior to Encounter   Medication Sig Dispense Refill    busPIRone (Buspar) 5 mg tablet Take 1 tablet (5 mg) by mouth 2 times a day.      clobetasol (Temovate) 0.05 % external solution Apply 1 Application topically 2 times a day.      clotrimazole-betamethasone (Lotrisone) cream Apply 1 Application topically 2 times a day.      famciclovir (Famvir) 500 mg tablet Take 1 tablet (500 mg) by mouth 1 time if needed. With oubreak      latanoprost (Xalatan) 0.005 % ophthalmic solution Administer 1 drop into both eyes once daily at bedtime.      levothyroxine (Synthroid, Levoxyl) 25 mcg tablet Take 1 tablet (25 mcg) by mouth once daily.      propranolol (Inderal) 20 mg tablet Take 1 tablet (20 mg) by mouth 2 times a day.      Restasis 0.05 % ophthalmic emulsion Administer 1 drop into both eyes every 12 hours.      sertraline (Zoloft) 50 mg tablet Take 2 tablets (100 mg) by mouth once daily.      Alphagan P 0.1 % ophthalmic solution Administer 1 drop into both eyes every 12 hours.      ciclopirox 1 % shampoo Apply 1 Application topically once daily at bedtime.      dexlansoprazole (Dexilant) 30 mg DR capsule Take 1 capsule (30 mg) by mouth once daily.      fish oil concentrate (Omega-3) 120-180 mg capsule Take 1 capsule (1 g) by mouth once daily.      fluticasone (Flonase) 50 mcg/actuation nasal spray Administer 1 spray into each nostril once daily.      QUEtiapine (SEROquel) 50 mg tablet Take 1 tablet (50 mg) by mouth once daily at bedtime.      timolol (Timoptic) 0.5 % ophthalmic solution Administer 1 drop into both eyes once daily.           PHYSICAL EXAM:   Vitals:    10/15/23 2104 10/16/23 0020 10/16/23 0348 10/16/23 0732   BP:  125/77 167/88 (!)  127/94   BP Location:  Right arm Right arm Right arm   Patient Position:   Lying Lying   Pulse:  66 68 72   Resp:  18 18 17   Temp:  36.5 °C (97.7 °F) 36.3 °C (97.3 °F) 36.2 °C (97.2 °F)   TempSrc:  Oral Oral Oral   SpO2: 94% 95% 97% 97%   Weight:       Height:            PHYSICAL EXAMINATION:    General appearance:  frail elderly   Skin: skin color, texture, turgor normal,   HEENT: Anicteric sclera.  Oropharynx mucosa moist  Neck: Supple, no adenopathy;   Back: no pain to palpation over spine or costovertebral angles,   Lungs: clear to auscultation, no wheezing or rhonchi  Heart: RRR without murmur, gallop, or rubs.   Abdomen: Abdomen soft, non-tender. Bowel sounds normal. No masses, organomegaly  Extremities: Extremities normal. No  edema, or skin discoloration.   Musculoskeletal: Spine range of motion normal. Muscular strength intact  Neuro: Oriented X  3    DATA: CBC, Coags, BMP, Mg, Phos   Results for orders placed or performed during the hospital encounter of 10/02/23 (from the past 96 hour(s))   POCT GLUCOSE   Result Value Ref Range    POCT Glucose 122 (H) 74 - 99 mg/dL   Basic Metabolic Panel   Result Value Ref Range    Glucose 127 (H) 74 - 99 mg/dL    Sodium 129 (L) 136 - 145 mmol/L    Potassium 3.5 3.5 - 5.3 mmol/L    Chloride 98 98 - 107 mmol/L    Bicarbonate 25 21 - 32 mmol/L    Anion Gap 10 10 - 20 mmol/L    Urea Nitrogen 12 6 - 23 mg/dL    Creatinine 0.56 0.50 - 1.05 mg/dL    eGFR 90 >60 mL/min/1.73m*2    Calcium 8.1 (L) 8.6 - 10.3 mg/dL   POCT GLUCOSE   Result Value Ref Range    POCT Glucose 125 (H) 74 - 99 mg/dL   Basic metabolic panel   Result Value Ref Range    Glucose 100 (H) 74 - 99 mg/dL    Sodium 130 (L) 136 - 145 mmol/L    Potassium 4.0 3.5 - 5.3 mmol/L    Chloride 97 (L) 98 - 107 mmol/L    Bicarbonate 27 21 - 32 mmol/L    Anion Gap 10 10 - 20 mmol/L    Urea Nitrogen 11 6 - 23 mg/dL    Creatinine 0.41 (L) 0.50 - 1.05 mg/dL    eGFR >90 >60 mL/min/1.73m*2    Calcium 8.5 (L) 8.6 - 10.3 mg/dL    Basic Metabolic Panel   Result Value Ref Range    Glucose 87 74 - 99 mg/dL    Sodium 130 (L) 136 - 145 mmol/L    Potassium 3.8 3.5 - 5.3 mmol/L    Chloride 98 98 - 107 mmol/L    Bicarbonate 26 21 - 32 mmol/L    Anion Gap 10 10 - 20 mmol/L    Urea Nitrogen 11 6 - 23 mg/dL    Creatinine 0.42 (L) 0.50 - 1.05 mg/dL    eGFR >90 >60 mL/min/1.73m*2    Calcium 8.4 (L) 8.6 - 10.3 mg/dL   Lavender Top   Result Value Ref Range    Extra Tube Hold for add-ons.            ASSESSMENT AND PLAN:     Principal Problem:    COVID-19  Active Problems:    Anxiety    Pancreatic lesion      #COVID-pneumonia  Completed 5 days of remdesivir  Denies any shortness of breath  ID signed off       #Depression with anxiety  Continue home medications  Can follow up w psyche as out patient      #Hyponatremia  Improving  Renal on board      #Hypokalemia     #Chronic Samson catheter  Samson removed  Cont to [facility]     #UTI (CAUTI)  treated     Hyponatremmia improving   Input from renal noted  Follow labs       PTOT      Dc planning   Plan of care discussed with: Provider, RN, Patient.      Patient case and plan of care discussed with Dr. JESSICA Drew.    Jose Carlisle, TREASURE - CNP  -In collaboration with Dr. JESSICA Drew    [facility]  Office: [phone]  Fax: [phone]  I have reviewed the above note obtained and documented by the NP/PA and I personally participated in the key components. I have discussed the case and management of the patient's care. Changes made to the note, and all key components of history and physical/progress note done by me.  dw nursing  Seen this evenign   She is feeling better  Will give one time dose of xanax  And dc plan tomorrow with [facility] care  See orders  Angel Drew MD

## 2023-10-17 ENCOUNTER — PATIENT OUTREACH (OUTPATIENT)
Dept: CARE COORDINATION | Facility: CLINIC | Age: 83
End: 2023-10-17
Payer: MEDICARE

## 2023-10-17 ENCOUNTER — HOME HEALTH ADMISSION (OUTPATIENT)
Dept: HOME HEALTH SERVICES | Facility: HOME HEALTH | Age: 83
End: 2023-10-17
Payer: MEDICARE

## 2023-10-17 ENCOUNTER — PHARMACY VISIT (OUTPATIENT)
Dept: PHARMACY | Facility: CLINIC | Age: 83
End: 2023-10-17
Payer: COMMERCIAL

## 2023-10-17 VITALS
HEART RATE: 68 BPM | DIASTOLIC BLOOD PRESSURE: 85 MMHG | BODY MASS INDEX: 33.31 KG/M2 | SYSTOLIC BLOOD PRESSURE: 149 MMHG | HEIGHT: 64 IN | OXYGEN SATURATION: 97 % | TEMPERATURE: 98.1 F | WEIGHT: 195.11 LBS | RESPIRATION RATE: 18 BRPM

## 2023-10-17 PROBLEM — R33.9 URINARY RETENTION: Chronic | Status: ACTIVE | Noted: 2023-10-17

## 2023-10-17 PROBLEM — R54 AGE-RELATED PHYSICAL DEBILITY: Chronic | Status: ACTIVE | Noted: 2023-10-17

## 2023-10-17 PROCEDURE — RXMED WILLOW AMBULATORY MEDICATION CHARGE

## 2023-10-17 PROCEDURE — 96372 THER/PROPH/DIAG INJ SC/IM: CPT | Performed by: FAMILY MEDICINE

## 2023-10-17 PROCEDURE — 2500000004 HC RX 250 GENERAL PHARMACY W/ HCPCS (ALT 636 FOR OP/ED): Performed by: FAMILY MEDICINE

## 2023-10-17 PROCEDURE — 97530 THERAPEUTIC ACTIVITIES: CPT | Mod: GO

## 2023-10-17 PROCEDURE — 2500000004 HC RX 250 GENERAL PHARMACY W/ HCPCS (ALT 636 FOR OP/ED): Performed by: PSYCHIATRY & NEUROLOGY

## 2023-10-17 PROCEDURE — 97535 SELF CARE MNGMENT TRAINING: CPT | Mod: GO

## 2023-10-17 PROCEDURE — 2500000001 HC RX 250 WO HCPCS SELF ADMINISTERED DRUGS (ALT 637 FOR MEDICARE OP): Performed by: FAMILY MEDICINE

## 2023-10-17 RX ORDER — TAMSULOSIN HYDROCHLORIDE 0.4 MG/1
0.4 CAPSULE ORAL NIGHTLY
Qty: 30 CAPSULE | Refills: 0 | Status: SHIPPED | OUTPATIENT
Start: 2023-10-17 | End: 2023-11-16

## 2023-10-17 RX ORDER — POLYETHYLENE GLYCOL 3350 17 G/17G
17 POWDER, FOR SOLUTION ORAL DAILY
Start: 2023-10-17 | End: 2023-10-17 | Stop reason: HOSPADM

## 2023-10-17 RX ORDER — QUETIAPINE FUMARATE 25 MG/1
25 TABLET, FILM COATED ORAL NIGHTLY
Qty: 7 TABLET | Refills: 0 | Status: SHIPPED | OUTPATIENT
Start: 2023-10-17 | End: 2024-05-09 | Stop reason: HOSPADM

## 2023-10-17 RX ORDER — TALC
3 POWDER (GRAM) TOPICAL NIGHTLY
Start: 2023-10-17 | End: 2023-10-17 | Stop reason: HOSPADM

## 2023-10-17 RX ORDER — QUETIAPINE FUMARATE 50 MG/1
25 TABLET, FILM COATED ORAL NIGHTLY
Start: 2023-10-17 | End: 2023-10-17 | Stop reason: SDUPTHER

## 2023-10-17 RX ADMIN — TIMOLOL MALEATE 1 DROP: 5 SOLUTION/ DROPS OPHTHALMIC at 08:36

## 2023-10-17 RX ADMIN — POLYETHYLENE GLYCOL (3350) 17 G: 17 POWDER, FOR SOLUTION ORAL at 08:34

## 2023-10-17 RX ADMIN — ENOXAPARIN SODIUM 40 MG: 40 INJECTION SUBCUTANEOUS at 08:34

## 2023-10-17 RX ADMIN — PANTOPRAZOLE SODIUM 20 MG: 20 TABLET, DELAYED RELEASE ORAL at 06:20

## 2023-10-17 RX ADMIN — BRIMONIDINE TARTRATE 1 DROP: 2 SOLUTION/ DROPS OPHTHALMIC at 05:54

## 2023-10-17 RX ADMIN — SERTRALINE HYDROCHLORIDE 50 MG: 50 TABLET ORAL at 08:34

## 2023-10-17 RX ADMIN — CLOTRIMAZOLE AND BETAMETHASONE DIPROPIONATE 1 APPLICATION: 10; .64 CREAM TOPICAL at 08:45

## 2023-10-17 RX ADMIN — PROPRANOLOL HYDROCHLORIDE 20 MG: 10 TABLET ORAL at 08:34

## 2023-10-17 RX ADMIN — LEVOTHYROXINE SODIUM 25 MCG: 0.03 TABLET ORAL at 08:34

## 2023-10-17 ASSESSMENT — COGNITIVE AND FUNCTIONAL STATUS - GENERAL
DAILY ACTIVITIY SCORE: 19
MOBILITY SCORE: 20
TOILETING: A LITTLE
DRESSING REGULAR LOWER BODY CLOTHING: A LITTLE
WALKING IN HOSPITAL ROOM: A LITTLE
HELP NEEDED FOR BATHING: A LITTLE
TOILETING: A LITTLE
MOVING TO AND FROM BED TO CHAIR: A LITTLE
WALKING IN HOSPITAL ROOM: A LITTLE
DAILY ACTIVITIY SCORE: 21
MOBILITY SCORE: 20
CLIMB 3 TO 5 STEPS WITH RAILING: A LITTLE
STANDING UP FROM CHAIR USING ARMS: A LITTLE
TOILETING: A LITTLE
DRESSING REGULAR LOWER BODY CLOTHING: A LITTLE
DRESSING REGULAR UPPER BODY CLOTHING: A LITTLE
HELP NEEDED FOR BATHING: A LITTLE
HELP NEEDED FOR BATHING: A LITTLE
PERSONAL GROOMING: A LITTLE
DAILY ACTIVITIY SCORE: 19
DRESSING REGULAR LOWER BODY CLOTHING: A LITTLE
MOVING TO AND FROM BED TO CHAIR: A LITTLE
DRESSING REGULAR UPPER BODY CLOTHING: A LITTLE
PERSONAL GROOMING: A LITTLE
CLIMB 3 TO 5 STEPS WITH RAILING: A LITTLE
STANDING UP FROM CHAIR USING ARMS: A LITTLE

## 2023-10-17 ASSESSMENT — ACTIVITIES OF DAILY LIVING (ADL): HOME_MANAGEMENT_TIME_ENTRY: 17

## 2023-10-17 ASSESSMENT — PAIN SCALES - GENERAL
PAINLEVEL_OUTOF10: 0 - NO PAIN
PAINLEVEL_OUTOF10: 0 - NO PAIN

## 2023-10-17 NOTE — PROGRESS NOTES
SIRS Qi Davila is a 82 y.o. female on day 15 of admission presenting with COVID-19.  She has a history of depression and anxiety, suicidal ideation, GERD, obstructive sleep apnea with chronic hyponatremia.  She presented on October 2 with headache and suicidal ideation.  She was COVID-positive, required oxygen and was seen by infectious disease, placed on remdesivir.  Sodium was 124 mEq/L.  She is on a SSRI.  Low uric acid of 2.2 prior.  TSH was normal, no evidence of adrenal insufficiency.  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  Sodium was in the low 120s and was felt that she was asymptomatic, she was given a dose of tolvaptan.  Sodium was then trending above 130, she was placed on a fluid restriction.    Subjective   Seen and examined.  Sitting up in a chair. Reports a healthy appetite.   No acute events, does not offer specific complaints.       Objective      Vitals 24HR  Heart Rate:  [73-84]   Temp:  [35.8 °C (96.4 °F)-36.8 °C (98.3 °F)]   Resp:  [18]   BP: (125-152)/(53-84)   SpO2:  [94 %-97 %]     Intake/Output last 3 Shifts:    Intake/Output Summary (Last 24 hours) at 10/17/2023 0943  Last data filed at 10/17/2023 0900  Gross per 24 hour   Intake 450 ml   Output --   Net 450 ml         Physical Exam  Constitutional: Elderly female, alert and oriented x3 in no acute distress  Eyes: Normal external exam.   Ears, Nose, Mouth, and Throat: External inspection of ears and nose: Normal.    Neck: No neck mass was observed. Supple. Thyroid not enlarged.  Cardiovascular: Normal heart rate and rhythm, normal S1 and S2, no gallops, no murmurs and no pericardial rub.   Pulmonary: No respiratory distress. Clear bilateral breath sounds.   Abdomen: Soft nontender; no abdominal mass palpated. Normal bowel sounds.   Musculoskeletal: No joint swelling   Skin: Warm and dry  Neurologic: Cranial nerves 2-12 grossly intact. Non focal  Lymphatic: No cervical lymphadenopathy.   Relevant Results         Results from last 7 days   Lab Units 10/16/23  0528 10/15/23  1216 10/13/23  1533   SODIUM mmol/L 130* 130* 129*   POTASSIUM mmol/L 3.8 4.0 3.5   CHLORIDE mmol/L 98 97* 98   CO2 mmol/L 26 27 25   BUN mg/dL 11 11 12   CREATININE mg/dL 0.42* 0.41* 0.56   GLUCOSE mg/dL 87 100* 127*   CALCIUM mg/dL 8.4* 8.5* 8.1*         XR chest 2 views   Final Result   Cardiomegaly with diffuse interstitial pulmonary opacities suggestive   of pulmonary edema. Superimposed infection not excluded.        Signed by: Stacey Howell 10/2/2023 12:17 PM   Dictation workstation:   [id number]        Scheduled medications  brimonidine, 1 drop, Both Eyes, q12h  clotrimazole-betamethasone, 1 Application, Topical, BID  cycloSPORINE, 1 drop, Both Eyes, q12h  enoxaparin, 40 mg, subcutaneous, Daily  latanoprost, 1 drop, Both Eyes, Nightly  levothyroxine, 25 mcg, oral, Daily  melatonin, 3 mg, oral, Nightly  pantoprazole, 20 mg, oral, Daily before breakfast  polyethylene glycol, 17 g, oral, Daily  propranolol, 20 mg, oral, BID  QUEtiapine, 25 mg, oral, Nightly  sertraline, 50 mg, oral, Daily  tamsulosin, 0.4 mg, oral, Nightly  timolol, 1 drop, Both Eyes, Daily      Continuous medications     PRN medications  PRN medications: acetaminophen **OR** acetaminophen **OR** acetaminophen, fluticasone, oxygen, prochlorperazine         Assessment/Plan      Qi Davila is a 82 y.o. female with a past medical history of headache,  depression, anxiety, suicidal ideation, arthritis, GERD, glaucoma, thyroid disorder, obstructive sleep apnea and chronic hyponatremia who presented with headache and suicidal ideation on 10/2.  She was found to be COVID-positive.  She has required supplemental O2.  Was seen by infectious disease and placed on remdesivir.  She was found to have a serum sodium of 124 mmol/a liter with normal renal function.  Review of her labs shows chronic hyponatremia.  She is on an SSRI.  Prior hyponatremia work-up is noted with a low uric  acid of 2.2, TSH is normal, no cortisol albeit blood pressures are generous.  Her serum osmolality is 259, urine osmolality of 450 with a spot urine sodium of 18.  She remains in COVID isolation.  Nephrology is consulted for renal care.     She has a history of depression and anxiety, suicidal ideation, GERD, obstructive sleep apnea with chronic hyponatremia.  She presented on October 2 with headache and suicidal ideation.  She was COVID-positive, required oxygen and was seen by infectious disease, placed on remdesivir.  Sodium was 124 mEq/L.  She is on a SSRI.  Low uric acid of 2.2 prior.  TSH was normal, no evidence of adrenal insufficiency.  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  Sodium was in the low 120s and was felt that she was asymptomatic, she was given a dose of tolvaptan.  Sodium was then trending above 130, she was placed on a fluid restriction.    Her intake looks to be okay, blood pressures are not low and I need to be careful with sodium.  But I told her her sodium intake can be slightly above what is typically recommended but that she must limit her fluid intake.  I am afraid she is at risk for readmissions, and may benefit from low-dose tolvaptan every other day as an outpatient but this can be challenging to obtain.  We cannot hold her Zoloft as she has been on it for a decade and has a history of suicidal ideation.  Dr. Fernandez will see her as an outpatient, please draw a renal function panel for him 3 days after discharge and have it faxed to [phone].  No new recommendations.    Principal Problem:    COVID-19  Active Problems:    Anxiety    Pancreatic lesion            I spent 35 minutes in the professional and overall care of this patient.      Benjy Rocha MD

## 2023-10-17 NOTE — PROGRESS NOTES
"INPATIENT PROGRESS NOTES    PRIMARY SERVICE: Angel Drew MD     10/17/2023  8:32 AM       INTERVAL HPI: Pt feels OK    No new complaints     Asking for a new walker as hers was \"lost\"       MEDICATIONS:    No current facility-administered medications on file prior to encounter.     Current Outpatient Medications on File Prior to Encounter   Medication Sig Dispense Refill    busPIRone (Buspar) 5 mg tablet Take 1 tablet (5 mg) by mouth 2 times a day.      clobetasol (Temovate) 0.05 % external solution Apply 1 Application topically 2 times a day.      clotrimazole-betamethasone (Lotrisone) cream Apply 1 Application topically 2 times a day.      famciclovir (Famvir) 500 mg tablet Take 1 tablet (500 mg) by mouth 1 time if needed. With oubreak      latanoprost (Xalatan) 0.005 % ophthalmic solution Administer 1 drop into both eyes once daily at bedtime.      levothyroxine (Synthroid, Levoxyl) 25 mcg tablet Take 1 tablet (25 mcg) by mouth once daily.      propranolol (Inderal) 20 mg tablet Take 1 tablet (20 mg) by mouth 2 times a day.      Restasis 0.05 % ophthalmic emulsion Administer 1 drop into both eyes every 12 hours.      sertraline (Zoloft) 50 mg tablet Take 2 tablets (100 mg) by mouth once daily.      Alphagan P 0.1 % ophthalmic solution Administer 1 drop into both eyes every 12 hours.      ciclopirox 1 % shampoo Apply 1 Application topically once daily at bedtime.      dexlansoprazole (Dexilant) 30 mg DR capsule Take 1 capsule (30 mg) by mouth once daily.      fish oil concentrate (Omega-3) 120-180 mg capsule Take 1 capsule (1 g) by mouth once daily.      fluticasone (Flonase) 50 mcg/actuation nasal spray Administer 1 spray into each nostril once daily.      timolol (Timoptic) 0.5 % ophthalmic solution Administer 1 drop into both eyes once daily.      [DISCONTINUED] QUEtiapine (SEROquel) 50 mg tablet Take 1 tablet (50 mg) by mouth once daily at bedtime.           PHYSICAL EXAM:   Vitals:    10/16/23 2024 " 10/17/23 0100 10/17/23 0300 10/17/23 0747   BP: 125/53 133/68 130/84 138/69   BP Location: Right arm Right arm Right arm Right arm   Patient Position: Lying Lying Lying Lying   Pulse: 79 77 78 84   Resp: 18 18 18 18   Temp: 35.8 °C (96.4 °F) 36.3 °C (97.3 °F) 36.2 °C (97.2 °F) 36.2 °C (97.2 °F)   TempSrc: Temporal Oral Temporal Temporal   SpO2: 96% 96% 94% 97%   Weight:       Height:            PHYSICAL EXAMINATION:    General appearance:  frail elderly   Skin: skin color, texture, turgor normal,   HEENT: Anicteric sclera.  Oropharynx mucosa moist  Neck: Supple, no adenopathy;   Back: no pain to palpation over spine or costovertebral angles,   Lungs: clear to auscultation, no wheezing or rhonchi  Heart: RRR without murmur, gallop, or rubs.   Abdomen: Abdomen soft, non-tender. Bowel sounds normal. No masses, organomegaly  Extremities: Extremities normal. No  edema, or skin discoloration.   Musculoskeletal: Spine range of motion normal. Muscular strength intact  Neuro: Oriented X  3    DATA: CBC, Coags, BMP, Mg, Phos   Results for orders placed or performed during the hospital encounter of 10/02/23 (from the past 96 hour(s))   Basic Metabolic Panel   Result Value Ref Range    Glucose 127 (H) 74 - 99 mg/dL    Sodium 129 (L) 136 - 145 mmol/L    Potassium 3.5 3.5 - 5.3 mmol/L    Chloride 98 98 - 107 mmol/L    Bicarbonate 25 21 - 32 mmol/L    Anion Gap 10 10 - 20 mmol/L    Urea Nitrogen 12 6 - 23 mg/dL    Creatinine 0.56 0.50 - 1.05 mg/dL    eGFR 90 >60 mL/min/1.73m*2    Calcium 8.1 (L) 8.6 - 10.3 mg/dL   POCT GLUCOSE   Result Value Ref Range    POCT Glucose 125 (H) 74 - 99 mg/dL   Basic metabolic panel   Result Value Ref Range    Glucose 100 (H) 74 - 99 mg/dL    Sodium 130 (L) 136 - 145 mmol/L    Potassium 4.0 3.5 - 5.3 mmol/L    Chloride 97 (L) 98 - 107 mmol/L    Bicarbonate 27 21 - 32 mmol/L    Anion Gap 10 10 - 20 mmol/L    Urea Nitrogen 11 6 - 23 mg/dL    Creatinine 0.41 (L) 0.50 - 1.05 mg/dL    eGFR >90 >60  mL/min/1.73m*2    Calcium 8.5 (L) 8.6 - 10.3 mg/dL   Basic Metabolic Panel   Result Value Ref Range    Glucose 87 74 - 99 mg/dL    Sodium 130 (L) 136 - 145 mmol/L    Potassium 3.8 3.5 - 5.3 mmol/L    Chloride 98 98 - 107 mmol/L    Bicarbonate 26 21 - 32 mmol/L    Anion Gap 10 10 - 20 mmol/L    Urea Nitrogen 11 6 - 23 mg/dL    Creatinine 0.42 (L) 0.50 - 1.05 mg/dL    eGFR >90 >60 mL/min/1.73m*2    Calcium 8.4 (L) 8.6 - 10.3 mg/dL   Lavender Top   Result Value Ref Range    Extra Tube Hold for add-ons.            ASSESSMENT AND PLAN:     Principal Problem:    COVID-19  Active Problems:    Anxiety    Pancreatic lesion      #COVID-pneumonia  Completed 5 days of remdesivir  Denies any shortness of breath  ID signed off       #Depression with anxiety  Continue home medications  Can follow up w psyche as out patient      #Hyponatremia  #Hypokalemia  Improving  Renal on board   Input noted   Labs as out patient           #Chronic Samson catheter  Samson removed  Passing urine  Cont to [facility]     #UTI (CAUTI)  treated         PTOT  New Rx for standard walker placed in EMR and also printed req in paper chart as per requests      Dc planning   Ppt remains stable for discharge, home w [facility]     Follow up as out patient   Plan of care discussed with: Provider, RN, Patient.      Patient case and plan of care discussed with Dr. JESSICA Drew.    Jose Carlisle, TREASURE - CNP  -In collaboration with Dr. JESSICA Drew    [facility]  Office: [phone]  Fax: [phone]  I have reviewed the above note obtained and documented by the NP/PA and I personally participated in the key components. I have discussed the case and management of the patient's care. Changes made to the note, and all key components of history and physical/progress note done by me.  dw nursing  Angel Drew MD

## 2023-10-17 NOTE — NURSING NOTE
Pt IV removed. Pt given discharge instructions and verbalized understanding. Pt given meds to beds.

## 2023-10-17 NOTE — PROGRESS NOTES
Care Coordinator Note:  Met with patient at the bedside to discuss discharge.  Patient to receive a walker prior to discharge.  [facility] notified via secure chat of discharge and responded back with a   SOC of 10/21.  Patient updated on SOC.  Daughter, Paola to transport patient home.  PCP: Dr. Jaylan Hall RN [facility]

## 2023-10-17 NOTE — PROGRESS NOTES
Occupational Therapy    Occupational Therapy Treatment    Name: Qi Davila  MRN: [id number]  : 1940  Date: 10/17/23  Time Calculation  Start Time: 1040  Stop Time: 1107  Time Calculation (min): 27 min    Assessment:  OT Assessment: Pt seen for OT tx, continues with fatigue with activity assist wit ADLS and mobility  Prognosis: Good  Barriers to Discharge: None  Evaluation/Treatment Tolerance: Patient tolerated treatment well  Medical Staff Made Aware: Yes  End of Session Communication: Bedside nurse  End of Session Patient Position: Up in chair, Alarm on  Plan:  Treatment Interventions: ADL retraining, Endurance training, Functional transfer training  OT Frequency: 3 times per week  OT Discharge Recommendations: Low intensity level of continued care  Equipment Recommended upon Discharge: Wheeled walker    Subjective   General:  OT Last Visit  OT Received On: 10/10/23  General  Reason for Referral: Weakness due to COVID+  Referred By: Rajendra  Past Medical History Relevant to Rehab: Pt is an 82 year old female presenting with abdominal pain, cough, fatigue, anxiety, and reported suicidal ideation. Pt seen in ED and did test positive for COVID. She has never been vaccinated.  Pt started on decadron and admitted. Did get dose of remdesivir in the ED. Hx of HTN, HLD, urinary retention, anxiety/depression.  Missed Visit: No  Missed Visit Reason: Patient refused  Prior to Session Communication: Bedside nurse  Patient Position Received: Bed, 3 rail up, Alarm off, not on at start of session  General Comment: pt cleared by nursing, Pt agreeable with OT tx     Pain Assessment:  Pain Assessment  Pain Assessment: 0-10  Pain Score: 0 - No pain     Objective   Activities of Daily Living: Feeding  Feeding Level of Assistance: Independent, Setup    Grooming  Grooming Level of Assistance: Setup  Grooming Where Assessed: Standing sinkside  Grooming Comments: completed combing hair, washiung hands    LE Dressing  LE  Dressing: Yes  Pants Level of Assistance: Close supervision  Shoe Level of Assistance: Distant supervision  LE Dressing Where Assessed: Edge of bed  LE Dressing Comments: able to pull on pants with SBA due to fatigue and balance    Toileting  Toileting Level of Assistance: Close supervision  Where Assessed: Toilet  Toileting Comments: completed toilet transfer and toilet hygiene with close supervision/.SBA utilizes grab bars for transfers    Functional Standing Tolerance:     Bed Mobility/Transfers: Transfers  Transfer: Yes  Transfer 1  Technique 1: Sit to stand  Transfer Device 1: Walker  Transfer Level of Assistance 1: Close supervision  Trials/Comments 1: slight LOB with sit-stand while coming to standing which pt was able to correct  Transfers 2  Transfer From 2: Stand to  Transfer to 2: Toilet  Technique 2: Stand to sit  Transfer Device 2: Walker  Transfer Level of Assistance 2: Close supervision  Trials/Comments 2: pt required close supervision and grab bar for transfer to toilet  Transfers 3  Transfer From 3: Toilet to  Transfer to 3: Stand  Technique 3: Stand to sit (on commode)  Transfer Device 3: Walker  Transfer Level of Assistance 3: Close supervision  Trials/Comments 3: required close supervision and grab bar to come to standing from toilet  Transfers 4  Technique 4: Stand to sit  Transfer Device 4: Walker  Transfer Level of Assistance 4: Close supervision, Minimal verbal cues    Toilet Transfers  Toilet Transfer From: Rolling walker  Toilet Transfer Type: To and from  Toilet Transfer to: Raised toilet seat with rails  Toilet Transfers: Supervision  Toilet Transfers Comments: utilized grab bars for transfers     Therapy/Activity: Therapeutic Activity  Therapeutic Activity Performed: Yes  Therapeutic Activity 1: ambulated 15 feet x2 with wheeled walker and supervision     Strength:  Strength  Strength Comments: WFL  Outcome Measures:  [facility] Daily Activity  Putting on and taking off regular lower body  clothing: A little  Bathing (including washing, rinsing, drying): A little  Putting on and taking off regular upper body clothing: None  Toileting, which includes using toilet, bedpan or urinal: A little  Taking care of personal grooming such as brushing teeth: None  Eating Meals: None  Daily Activity - Total Score: 21        Education Documentation  Precautions, taught by Kaylee You OT at 10/17/2023 11:32 AM.  Learner: Patient  Readiness: Acceptance  Method: Explanation  Response: Verbalizes Understanding    Home Exercise Program, taught by Kaylee You OT at 10/17/2023 11:32 AM.  Learner: Patient  Readiness: Acceptance  Method: Explanation  Response: Verbalizes Understanding    ADL Training, taught by Kaylee You OT at 10/17/2023 11:32 AM.  Learner: Patient  Readiness: Acceptance  Method: Explanation  Response: Verbalizes Understanding    Precautions, taught by Kaylee You OT at 10/16/2023 11:54 AM.  Learner: Patient  Readiness: Acceptance  Method: Explanation  Response: Verbalizes Understanding, Needs Reinforcement    Home Exercise Program, taught by Kaylee You OT at 10/16/2023 11:54 AM.  Learner: Patient  Readiness: Acceptance  Method: Explanation  Response: Verbalizes Understanding, Needs Reinforcement    ADL Training, taught by Kaylee You OT at 10/16/2023 11:54 AM.  Learner: Patient  Readiness: Acceptance  Method: Explanation  Response: Verbalizes Understanding, Needs Reinforcement    Education Comments  No comments found.      Goals:  Encounter Problems       Encounter Problems (Active)       ADLs       Patient will perform UB and LB bathing 75% with modified independent level of assistance and adaptive equipment prn  (Progressing)       Start:  10/10/23    Expected End:  10/24/23            Patient with complete lower body dressing with minimal assist  level of assistance donning and doffing all LE clothes  with PRN adaptive equipment while supported sitting  (Progressing)       Start:  10/10/23    Expected End:  10/24/23            Patient will complete toileting including hygiene clothing management/hygiene with modified independent level of assistance and Adaptive equipment prn  (Progressing)       Start:  10/10/23    Expected End:  10/24/23               Balance       STG - Maintains dynamic standing balance with upper extremity support (Progressing)       Start:  10/09/23    Expected End:  10/23/23       INTERVENTIONS:  1. Practice standing with minimal support.  2. Educate patient about standing tolerance.  3. Educate patient about independence with gait, transfers, and ADL's.  4. Educate patient about use of assistive device.  5. Educate patient about self-directed care.            COGNITION/SAFETY       Pt will recall events of tx session with 90% accuracy with cues prn  (Progressing)       Start:  10/10/23    Expected End:  10/24/23               Mobility       STG - Patient will ambulate (Progressing)       Start:  10/09/23    Expected End:  10/23/23       >/= 75', device PRN, MOD I, no LOB               Safety       Goal 1 (Progressing)       Start:  10/09/23    Expected End:  10/23/23       Pt will verbalize and apply safety awareness and precautions 100% of time throughout entire session               TRANSFERS       Patient will complete sit to stand transfer with supervision level of assistance and least restrictive device in order to improve safety and prepare for out of bed mobility. (Progressing)       Start:  10/10/23    Expected End:  10/24/23               Transfers       STG - Patient will perform bed mobility (Progressing)       Start:  10/09/23    Expected End:  10/23/23       At MOD I level, safely, no LOB         STG - Patient will transfer sit to and from stand (Progressing)       Start:  10/09/23    Expected End:  10/23/23       At MOD I level, safely, no LOB

## 2023-10-17 NOTE — PROGRESS NOTES
Patient returns this writers call stating she has not yet been discharged.    Patient states someone is in her room to speak with her and requests this writer call her back.    Will follow up with patient later in the week.    Engagement  Call Start Time: 1116 (10/17/2023 11:16 AM)    Medications  Medications reviewed with patient/caregiver?: Yes (10/17/2023 11:16 AM)  Is the patient having any side effects they believe may be caused by any medication additions or changes?: No (10/17/2023 11:16 AM)  Care Management Interventions: No intervention needed (10/17/2023 11:16 AM)    Appointments  Does the patient have a primary care provider?: Yes (10/17/2023 11:16 AM)  Care Management Interventions: Advised patient to make appointment (10/17/2023 11:16 AM)  Has the patient kept scheduled appointments due by today?: Yes (10/17/2023 11:16 AM)  Care Management Interventions: -- (Patient states has a [facility] PCP and does not wish to establish with any other new providers.   Pt states will not keep 10/19 urology appt) (10/17/2023 11:16 AM)    Self Management  What is the home health agency?: [facility] (10/17/2023 11:16 AM)    Patient Teaching  Care Management Interventions: Reviewed instructions with patient (10/17/2023 11:16 AM)  What is the patient's perception of their health status since discharge?: Improving (10/17/2023 11:16 AM)  Is the patient/caregiver able to teach back the hierarchy of who to call/visit for symptoms/problems? PCP, Specialist, Home Health nurse, Urgent Care, ED, 911: Yes (10/17/2023 11:16 AM)    Wrap Up  Call End Time: 1118 (10/17/2023 11:16 AM)      Dot FORD RN CCM  RN Care Coordinator  [facility]  Phone [phone]

## 2023-10-17 NOTE — CARE PLAN
Pages from nursing regarding Seroquel  Pt unsure if she has at home  7 day rx sent out  Can follow up with PCP for refills     Jose Carlisle, APRN-CNP

## 2023-10-17 NOTE — PROGRESS NOTES
Admitted to [facility] 10/2/23 to 10/17/23 with COVID, UTI, and hyponatremia.   Lives alone in senior. Living apartment with little support.   Passed voiding  trial.  Home with home care on Flommax and adjusted dose of home Seroquel.  Appt with urology 10/19.      Attempted outreach to patient for transition of care completion; left voice mail message.   Enrolled in conversa chat to [facility] for 30 day transition period and will outreach if issues arise.    Dot FORD RN CCM  RN Care Coordinator  [facility]  Phone [phone]

## 2023-10-17 NOTE — DISCHARGE SUMMARY
Inpatient Discharge Summary    BRIEF OVERVIEW  Admitting Provider: Angel Drew MD  Discharge Provider: Angel Drew MD  Primary Care Physician at Discharge: Jaylan Gary [phone]     Treatment Team:   Attending Provider: Angel Drew MD  Psychiatrist: Acacia Bowling MD  Consulting Physician: Richie Fernandez DO  Patient Care Technician: Krystina Lopez  Transitional Care Coordinator: Anya Hall RN  Registered Nurse: Jackelyn Andrade RN  Patient Care Technician: Dorene Chauhan  Registered Nurse: Kenzie Mattson RN  Occupational Therapist: Kaylee You OT  Speech Language Pathologist: CHRISTOPHE Olivas      Admission Date: 10/2/2023     Discharge Date: 10/17/2023    Primary Discharge Diagnosis  Principal Problem:    COVID-19  Active Problems:    Anxiety    Pancreatic lesion        Principal Problem:    COVID-19  Active Problems:    Anxiety    Pancreatic lesion       #COVID-pneumonia  Completed 5 days of remdesivir  Denies any shortness of breath  ID signed off        #Depression with anxiety  Continue home medications  Can follow up w psyche as out patient      #Hyponatremia  #Hypokalemia  Improving  Renal on board   Input noted   Labs as out patient            #Chronic Samson catheter  Samson removed  Passing urine  Cont to [facility]      #UTI (CAUTI)  treated           PTOT  New Rx for standard walker placed in EMR and also printed req in paper chart as per requests        Dc planning   Ppt remains stable for discharge, home w [facility]      Discharge Disposition  Home  Code Status at Discharge: stable    Active Issues Requiring Follow-up  Issue: Hyponatremia   Responsible Individual: Renal, Dr Fernandez   What is Needed: follow up  Follow-up Appointments Arranged:  Referral placed in Epic    Issue: Hospital Follow up / Pancreatic lesion   Responsible Individual: PCP  What is Needed: follow up   Follow-up Appointments Arranged:  directed to make follow up appt in AVS    Outpatient Follow-Up  No  future appointments.      Test Results Pending at Discharge  Pending Labs       Order Current Status    Extra Tubes In process    Extra Tubes In process    Extra Tubes In process    Tavarez Top In process    Green Top In process    Light Blue Top In process    Red Top In process    SST TOP In process    Extra Tubes Preliminary result    Lavender Top Preliminary result                  DETAILS OF HOSPITAL STAY    Presenting Problem/History of Present Illness  COVID-19 [U07.1]        History Of Present Illness  Qi Davila is a 82 y.o. female presenting with abdominal pain, cough, tired, and anxiety   Pt was seen in ED and she did test positive for covid, she has never been vaccinated  Pt started on decadron and admitted  Did get dose of remdesivir in the ED  No sick contacts  Lives alone  Per pt not much social support  Hospital Course     See above          Your medication list        START taking these medications        Instructions Last Dose Given Next Dose Due   tamsulosin 0.4 mg 24 hr capsule  Commonly known as: Flomax      Take 1 capsule (0.4 mg) by mouth once daily at bedtime.              CHANGE how you take these medications        Instructions Last Dose Given Next Dose Due   QUEtiapine 50 mg tablet  Commonly known as: SEROquel  What changed: how much to take      Take 0.5 tablets (25 mg) by mouth once daily at bedtime.              CONTINUE taking these medications        Instructions Last Dose Given Next Dose Due   Alphagan P 0.1 % ophthalmic solution  Generic drug: brimonidine           ciclopirox 1 % shampoo           clobetasol 0.05 % external solution  Commonly known as: Temovate           clotrimazole-betamethasone cream  Commonly known as: Lotrisone           dexlansoprazole 30 mg DR capsule  Commonly known as: Dexilant           famciclovir 500 mg tablet  Commonly known as: Famvir           fluticasone 50 mcg/actuation nasal spray  Commonly known as: Flonase           latanoprost 0.005 % ophthalmic  solution  Commonly known as: Xalatan           levothyroxine 25 mcg tablet  Commonly known as: Synthroid, Levoxyl           propranolol 20 mg tablet  Commonly known as: Inderal           Restasis 0.05 % ophthalmic emulsion  Generic drug: cycloSPORINE           sertraline 50 mg tablet  Commonly known as: Zoloft           timolol 0.5 % ophthalmic solution  Commonly known as: Timoptic                  STOP taking these medications      busPIRone 5 mg tablet  Commonly known as: Buspar        fish oil concentrate 120-180 mg capsule  Commonly known as: Omega-3                  Where to Get Your Medications        These medications were sent to [facility]  [address] [place] [address], [place] [address]      Hours: 8 AM to 6 PM Mon-Fri, 9 AM to 1 PM Saturday Phone: [phone]   tamsulosin 0.4 mg 24 hr capsule       Information about where to get these medications is not yet available    Ask your nurse or doctor about these medications  QUEtiapine 50 mg tablet           Operative Procedures Performed        Physical Exam at Discharge  Discharge Condition: stable  Heart Rate: 84  Resp: 18  BP: 138/69  Temp: 36.2 °C (97.2 °F)  Weight: 88.5 kg (195 lb 1.7 oz)     agree [facility] discharge summary , reviewd and dw NP  Angel Drew MD

## 2023-10-18 ENCOUNTER — PHARMACY VISIT (OUTPATIENT)
Dept: PHARMACY | Facility: CLINIC | Age: 83
End: 2023-10-18
Payer: COMMERCIAL

## 2023-10-18 ENCOUNTER — PATIENT OUTREACH (OUTPATIENT)
Dept: CARE COORDINATION | Facility: CLINIC | Age: 83
End: 2023-10-18
Payer: MEDICARE

## 2023-10-18 DIAGNOSIS — F41.9 ANXIETY: Primary | ICD-10-CM

## 2023-10-18 RX ORDER — SERTRALINE HYDROCHLORIDE 50 MG/1
100 TABLET, FILM COATED ORAL DAILY
Qty: 14 TABLET | Refills: 0 | Status: SHIPPED | OUTPATIENT
Start: 2023-10-18 | End: 2024-05-09 | Stop reason: HOSPADM

## 2023-10-18 NOTE — PROGRESS NOTES
Response from discharging provider that 7 day supply of Seroquel sent in to [facility] 10/17 and sent in 7 day supply of Zoloft today to same.     Voice mail message to patient to inform of such and to schedule appt with PCP and/or psychiatrist for additional prescriptions for such.    Dot FORD RN CCM  RN Care Coordinator  [facility]  Phone [phone]

## 2023-10-18 NOTE — PROGRESS NOTES
Successful outreach to patient for completion of transition of care.   Enrolled patient in conversa chat to monitor for 30 day transition period and will outreach if issues arise.    Engagement  Call Start Time: 1216 (10/18/2023 12:16 PM)    Medications  Medications reviewed with patient/caregiver?: Yes (10/18/2023 12:16 PM)  Is the patient having any side effects they believe may be caused by any medication additions or changes?: No (10/18/2023 12:16 PM)  Does the patient have all medications ordered at discharge?: No (Patient states does not have a prescription for her home Zoloft or new med Seroquel.   Patient has contacted psychiatrist for such, but no call back to date.    Epic high priority message sent to discharging provider.) (10/18/2023 12:16 PM)  Care Management Interventions: No intervention needed (10/17/2023 11:16 AM)  Is the patient taking all medications as directed (includes completed medication regime)?: Yes (10/18/2023 12:16 PM)    Appointments  Does the patient have a primary care provider?: Yes (10/18/2023 12:16 PM)  Care Management Interventions: Educated patient on importance of making appointment (10/18/2023 12:16 PM)  Has the patient kept scheduled appointments due by today?: Yes (10/18/2023 12:16 PM)  Care Management Interventions: -- (Patient states has a [facility] PCP and does not wish to establish with any other new providers.   Pt states will not keep 10/19 urology appt) (10/17/2023 11:16 AM)    Self Management  What is the home health agency?: [facility]  SOC 10/21 (10/18/2023 12:16 PM)  Has home health visited the patient within 72 hours of discharge?: Yes (10/18/2023 12:16 PM)    Patient Teaching  Does the patient have access to their discharge instructions?: Yes (10/18/2023 12:16 PM)  Care Management Interventions: Reviewed instructions with patient (10/18/2023 12:16 PM)  What is the patient's perception of their health status since discharge?: Improving (10/18/2023 12:16 PM)  Is the  patient/caregiver able to teach back the hierarchy of who to call/visit for symptoms/problems? PCP, Specialist, Home Health nurse, Urgent Care, ED, 911: Yes (10/18/2023 12:16 PM)    Wrap Up  Call End Time: 1230 (10/18/2023 12:16 PM)    Dot FORD RN CCM  RN Care Coordinator  [facility]  Phone [phone]

## 2023-10-18 NOTE — PROGRESS NOTES
Notified by nurse discharge coordinator / patient out reach regarding pt stating no prescription for her home medication of zoloft or seroquel  - asking for refills     Pt was discharged from [facility] 10/17/2023  Prior to discharge pt informed her bed side nurse that she was possibly out of her Seroquel (we reduced dose 50mg >> 25mg) and asking for refill of this medication. A 7 day supply was sent to [facility] as per request. Directed nurse to explain pt must contact PCP for further refills.     No other needs were made nor request to refill home mediations were mentioned prior to discharge.     As courtesy I can sent 7 day supply of her Zoloft (as above Rx for Seroquel previously sent 10/17/23) to [facility] pharmacy     Pt will need to follow up with PCP for refills moving forward    TREASURE Kim-CNP

## 2023-10-21 ENCOUNTER — HOME CARE VISIT (OUTPATIENT)
Dept: HOME HEALTH SERVICES | Facility: HOME HEALTH | Age: 83
End: 2023-10-21
Payer: MEDICARE

## 2023-10-21 VITALS
BODY MASS INDEX: 31.41 KG/M2 | SYSTOLIC BLOOD PRESSURE: 130 MMHG | HEART RATE: 70 BPM | HEIGHT: 64 IN | WEIGHT: 184 LBS | DIASTOLIC BLOOD PRESSURE: 80 MMHG

## 2023-10-21 PROCEDURE — 0023 HH SOC

## 2023-10-21 PROCEDURE — 169592 NO-PAY CLAIM PROCEDURE

## 2023-10-21 PROCEDURE — 1090000001 HH PPS REVENUE CREDIT

## 2023-10-21 PROCEDURE — 1090000002 HH PPS REVENUE DEBIT

## 2023-10-21 PROCEDURE — G0299 HHS/HOSPICE OF RN EA 15 MIN: HCPCS | Mod: HHH

## 2023-10-21 ASSESSMENT — ACTIVITIES OF DAILY LIVING (ADL)
BATHING ASSESSED: 1
GROOMING ASSESSED: 1
AMBULATION ASSISTANCE: 1
ENTERING_EXITING_HOME: MINIMUM ASSIST

## 2023-10-21 ASSESSMENT — PAIN SCALES - PAIN ASSESSMENT IN ADVANCED DEMENTIA (PAINAD)
CONSOLABILITY: 0 - NO NEED TO CONSOLE.
BODYLANGUAGE: 0 - RELAXED.
FACIALEXPRESSION: 0
TOTALSCORE: 0
CONSOLABILITY: 0
NEGVOCALIZATION: 0
NEGVOCALIZATION: 0 - NONE.
FACIALEXPRESSION: 0 - SMILING OR INEXPRESSIVE.
BODYLANGUAGE: 0
BREATHING: 0

## 2023-10-21 ASSESSMENT — ENCOUNTER SYMPTOMS
DENIES PAIN: 1
BOWEL PATTERN NORMAL: 1
CHANGE IN APPETITE: DECREASED
LAST BOWEL MOVEMENT: 66768
OCCASIONAL FEELINGS OF UNSTEADINESS: 1
DEPRESSION: 0
APPETITE LEVEL: GOOD
LOSS OF SENSATION IN FEET: 0
ABDOMINAL PAIN: 1

## 2023-10-22 PROCEDURE — 1090000001 HH PPS REVENUE CREDIT

## 2023-10-22 PROCEDURE — 1090000002 HH PPS REVENUE DEBIT

## 2023-10-22 ASSESSMENT — ACTIVITIES OF DAILY LIVING (ADL)
GROOMING_CURRENT_FUNCTION: SUPERVISION
DRESSING_LB_CURRENT_FUNCTION: SUPERVISION
DRESSING_UB_CURRENT_FUNCTION: SUPERVISION
BATHING_CURRENT_FUNCTION: SUPERVISION
AMBULATION ASSISTANCE: SUPERVISION

## 2023-10-22 ASSESSMENT — ENCOUNTER SYMPTOMS: LOWER EXTREMITY EDEMA: 1

## 2023-10-23 ENCOUNTER — HOME CARE VISIT (OUTPATIENT)
Dept: HOME HEALTH SERVICES | Facility: HOME HEALTH | Age: 83
End: 2023-10-23
Payer: MEDICARE

## 2023-10-23 VITALS — OXYGEN SATURATION: 98 % | HEART RATE: 73 BPM | SYSTOLIC BLOOD PRESSURE: 163 MMHG | DIASTOLIC BLOOD PRESSURE: 85 MMHG

## 2023-10-23 PROCEDURE — G0152 HHCP-SERV OF OT,EA 15 MIN: HCPCS | Mod: HHH

## 2023-10-23 PROCEDURE — 1090000001 HH PPS REVENUE CREDIT

## 2023-10-23 PROCEDURE — 1090000002 HH PPS REVENUE DEBIT

## 2023-10-23 SDOH — ECONOMIC STABILITY: HOUSING INSECURITY: HOME SAFETY: HAS SOME GRAB BARS. WOULD LIKE ADDITIONAL BY TOILET HAS SPOKEN TO APT MGMT

## 2023-10-23 ASSESSMENT — ACTIVITIES OF DAILY LIVING (ADL)
BATHING ASSESSED: 1
DRESSING_UB_CURRENT_FUNCTION: SUPERVISION
TELEPHONE USE ASSESSED: 1
PREPARING MEALS: NEEDS ASSISTANCE
AMBULATION ASSISTANCE: 1
TOILETING: SUPERVISION
AMBULATION ASSISTANCE: SUPERVISION
GROOMING ASSESSED: 1
BATHING_CURRENT_FUNCTION: MODERATE ASSIST
DRESSING_LB_CURRENT_FUNCTION: SUPERVISION
GROOMING_CURRENT_FUNCTION: SUPERVISION
LIGHT HOUSEKEEPING: NEEDS ASSISTANCE
TOILETING: 1
HOUSEKEEPING ASSESSED: 1
USING THE TELPHONE: INDEPENDENT

## 2023-10-23 ASSESSMENT — ENCOUNTER SYMPTOMS: DENIES PAIN: 1

## 2023-10-23 NOTE — HOME HEALTH
Patient alert and oriented x3. Patient denies pain. Patient up and ambulating with walker. Patient very anxious during visit and continually has to be kept on track with topic being discussed. Patient lives alone in senior living apartment complex. Patient has daughter who lives in the area and is willing to assist. Reviewed nursing discharge instructions and patient found to be taking seroquel twice daily verses at bedtime as prescribed. Reviewed with patient medications instructions and patient verbalizes understanding. Patient reports she is having diffuculty with sleeping at night and has been waking up at 4 am on a daily basis. Patient does have a scheduled virutal appointment this upcoming week with psychaitrist and SN encourage patient to discuss diffuculty sleeping during this appointment. Patient is agreeable to HHA services, PT, and OT. Patient refused SN to obtain blood work and states she does not want any blood work done ordered by the MD from the hospital. SN will notify both ordering provider and PCP about patient refusla for blood draw. Instructed patient to call into [facility] if any questions or concerns arise before next nursing visit.

## 2023-10-24 ENCOUNTER — HOME CARE VISIT (OUTPATIENT)
Dept: HOME HEALTH SERVICES | Facility: HOME HEALTH | Age: 83
End: 2023-10-24
Payer: MEDICARE

## 2023-10-24 VITALS
HEART RATE: 66 BPM | SYSTOLIC BLOOD PRESSURE: 110 MMHG | DIASTOLIC BLOOD PRESSURE: 68 MMHG | OXYGEN SATURATION: 95 % | TEMPERATURE: 95.5 F

## 2023-10-24 PROCEDURE — G0156 HHCP-SVS OF AIDE,EA 15 MIN: HCPCS | Mod: HHH

## 2023-10-24 PROCEDURE — 1090000002 HH PPS REVENUE DEBIT

## 2023-10-24 PROCEDURE — G0151 HHCP-SERV OF PT,EA 15 MIN: HCPCS | Mod: HHH

## 2023-10-24 PROCEDURE — 1090000001 HH PPS REVENUE CREDIT

## 2023-10-24 SDOH — HEALTH STABILITY: PHYSICAL HEALTH: EXERCISE TYPE: DECLINED EX TODAY

## 2023-10-24 ASSESSMENT — ENCOUNTER SYMPTOMS
PAIN LOCATION - EXACERBATING FACTORS: OA
PAIN LOCATION - PAIN DURATION: INTERMITTENT
PAIN LOCATION: LEFT SHOULDER
OCCASIONAL FEELINGS OF UNSTEADINESS: 0
PAIN LOCATION: RIGHT SHOULDER
PAIN LOCATION - EXACERBATING FACTORS: OA
PAIN LOCATION - RELIEVING FACTORS: OTC
LOWEST PAIN SEVERITY IN PAST 24 HOURS: 0/10
PAIN SEVERITY GOAL: 0/10
LOSS OF SENSATION IN FEET: 0
PAIN LOCATION - PAIN DURATION: INTERMITTENT
PAIN LOCATION - EXACERBATING FACTORS: OA
SUBJECTIVE PAIN PROGRESSION: UNCHANGED
PAIN LOCATION - RELIEVING FACTORS: OTC
PAIN: 1
PAIN LOCATION: LEFT KNEE
PAIN LOCATION: RIGHT KNEE
PAIN LOCATION - PAIN DURATION: INTERMITTENT
PAIN LOCATION - RELIEVING FACTORS: OTC
PAIN LOCATION - EXACERBATING FACTORS: OA
PAIN LOCATION - RELIEVING FACTORS: OTC
MUSCLE WEAKNESS: 1
PAIN LOCATION - PAIN DURATION: INTERMITTENT
HIGHEST PAIN SEVERITY IN PAST 24 HOURS: 6/10
DEPRESSION: 0
PERSON REPORTING PAIN: PATIENT

## 2023-10-24 ASSESSMENT — ACTIVITIES OF DAILY LIVING (ADL)
PHYSICAL TRANSFERS ASSESSED: 1
CURRENT_FUNCTION: INDEPENDENT
AMBULATION ASSISTANCE: 1
AMBULATION_DISTANCE/DURATION_TOLERATED: 40 FEET
AMBULATION ASSISTANCE ON FLAT SURFACES: 1

## 2023-10-24 ASSESSMENT — PAIN SCALES - PAIN ASSESSMENT IN ADVANCED DEMENTIA (PAINAD)
NEGVOCALIZATION: 0
BREATHING: 0
FACIALEXPRESSION: 0
CONSOLABILITY: 0 - NO NEED TO CONSOLE.
NEGVOCALIZATION: 0 - NONE.
FACIALEXPRESSION: 0 - SMILING OR INEXPRESSIVE.
CONSOLABILITY: 0

## 2023-10-24 ASSESSMENT — BALANCE ASSESSMENTS
SITTING BALANCE: 1 - STEADY, SAFE
NUDGED SCORE: 2
ARISING SCORE: 1
ATTEMPTS TO ARISE: 2 - ABLE TO RISE, ONE ATTEMPT
SITTING DOWN: 1 - USES ARMS OR NOT SMOOTH MOTION
STANDING BALANCE: 1 - STEADY BUT WIDE STANCE AND USES CANE OR OTHER SUPPORT
ARISES: 1 - ABLE, USES ARMS TO HELP
EYES CLOSED AT MAXIMUM POSITION NUDGED: 0 - UNSTEADY
BALANCE SCORE: 9
NUDGED: 2 - STEADY
TURNING 360 DEGREES STEPS: 0 - DISCONTINUOUS STEPS
IMMEDIATE STANDING BALANCE FIRST 5 SECONDS: 1 - STEADY BUT USES WALKER OR OTHER SUPPORT

## 2023-10-24 ASSESSMENT — GAIT ASSESSMENTS
BALANCE AND GAIT SCORE: 18
STEP CONTINUITY: 1 - STEPS APPEAR CONTINUOUS
PATH: 1 - MILD/MODERATE DEVIATION OR USES WALKING AID
WALKING STANCE: 1 - HEELS ALMOST TOUCHING WHILE WALKING
INITIATION OF GAIT IMMEDIATELY AFTER GO: 1 - NO HESITANCY
PATH SCORE: 1
STEP SYMMETRY: 1 - RIGHT AND LEFT STEP LENGTH APPEAR EQUAL
TRUNK: 0 - MARKED SWAY OR USES WALKING AID
TRUNK SCORE: 0
GAIT SCORE: 9

## 2023-10-24 NOTE — HOME HEALTH
patient was in [facility] for approx 3 weeks , uti and covid 19  live alone  has daughter lives nearby works alot

## 2023-10-25 ENCOUNTER — PATIENT OUTREACH (OUTPATIENT)
Dept: CARE COORDINATION | Facility: CLINIC | Age: 83
End: 2023-10-25
Payer: MEDICARE

## 2023-10-25 ENCOUNTER — HOME CARE VISIT (OUTPATIENT)
Dept: HOME HEALTH SERVICES | Facility: HOME HEALTH | Age: 83
End: 2023-10-25
Payer: MEDICARE

## 2023-10-25 PROCEDURE — 1090000001 HH PPS REVENUE CREDIT

## 2023-10-25 PROCEDURE — G0152 HHCP-SERV OF OT,EA 15 MIN: HCPCS | Mod: HHH

## 2023-10-25 PROCEDURE — 1090000002 HH PPS REVENUE DEBIT

## 2023-10-25 SDOH — ECONOMIC STABILITY: HOUSING INSECURITY: HOME SAFETY: NO CHANGES TO ENVT THUS FAR

## 2023-10-25 ASSESSMENT — ENCOUNTER SYMPTOMS
PAIN LOCATION: LEFT KNEE
PAIN: 1
PAIN SEVERITY GOAL: 1/10
HIGHEST PAIN SEVERITY IN PAST 24 HOURS: 3/10
LOWEST PAIN SEVERITY IN PAST 24 HOURS: 3/10

## 2023-10-25 NOTE — PROGRESS NOTES
Attempted patient outreach for hospital provider appointment follow up. Left voice mail message.     No post hospital provider notes or appts noted in chart.   Per chart patient had home RN visit 11/21, home OT on 10/23, and is scheduled for HHA and PT visit today.       Dot FORD RN CCM  RN Care Coordinator  [facility]  Phone [phone]

## 2023-10-26 ENCOUNTER — HOSPITAL ENCOUNTER (EMERGENCY)
Facility: HOSPITAL | Age: 83
Discharge: OTHER INSTITUTION | End: 2023-10-26
Attending: STUDENT IN AN ORGANIZED HEALTH CARE EDUCATION/TRAINING PROGRAM
Payer: MEDICARE

## 2023-10-26 VITALS
HEIGHT: 66 IN | TEMPERATURE: 97.8 F | WEIGHT: 184 LBS | RESPIRATION RATE: 20 BRPM | DIASTOLIC BLOOD PRESSURE: 73 MMHG | SYSTOLIC BLOOD PRESSURE: 164 MMHG | HEART RATE: 98 BPM | BODY MASS INDEX: 29.57 KG/M2 | OXYGEN SATURATION: 96 %

## 2023-10-26 DIAGNOSIS — F41.9 ANXIETY AND DEPRESSION: Primary | ICD-10-CM

## 2023-10-26 DIAGNOSIS — N39.0 ACUTE UTI: ICD-10-CM

## 2023-10-26 DIAGNOSIS — F32.A ANXIETY AND DEPRESSION: Primary | ICD-10-CM

## 2023-10-26 LAB
ALBUMIN SERPL BCP-MCNC: 3.6 G/DL (ref 3.4–5)
ALP SERPL-CCNC: 58 U/L (ref 33–136)
ALT SERPL W P-5'-P-CCNC: 17 U/L (ref 7–45)
AMPHETAMINES UR QL SCN: NORMAL
ANION GAP SERPL CALC-SCNC: 14 MMOL/L (ref 10–20)
APAP SERPL-MCNC: <10 UG/ML
APPEARANCE UR: CLEAR
AST SERPL W P-5'-P-CCNC: 31 U/L (ref 9–39)
BACTERIA #/AREA URNS AUTO: ABNORMAL /HPF
BARBITURATES UR QL SCN: NORMAL
BASOPHILS # BLD AUTO: 0.06 X10*3/UL (ref 0–0.1)
BASOPHILS NFR BLD AUTO: 0.7 %
BENZODIAZ UR QL SCN: NORMAL
BILIRUB SERPL-MCNC: 0.5 MG/DL (ref 0–1.2)
BILIRUB UR STRIP.AUTO-MCNC: NEGATIVE MG/DL
BUN SERPL-MCNC: 10 MG/DL (ref 6–23)
BZE UR QL SCN: NORMAL
CALCIUM SERPL-MCNC: 8.6 MG/DL (ref 8.6–10.3)
CANNABINOIDS UR QL SCN: NORMAL
CHLORIDE SERPL-SCNC: 101 MMOL/L (ref 98–107)
CO2 SERPL-SCNC: 22 MMOL/L (ref 21–32)
COLOR UR: ABNORMAL
CREAT SERPL-MCNC: 0.56 MG/DL (ref 0.5–1.05)
EOSINOPHIL # BLD AUTO: 0.14 X10*3/UL (ref 0–0.4)
EOSINOPHIL NFR BLD AUTO: 1.6 %
ERYTHROCYTE [DISTWIDTH] IN BLOOD BY AUTOMATED COUNT: 14.9 % (ref 11.5–14.5)
ETHANOL SERPL-MCNC: <10 MG/DL
FENTANYL+NORFENTANYL UR QL SCN: NORMAL
GFR SERPL CREATININE-BSD FRML MDRD: 90 ML/MIN/1.73M*2
GLUCOSE SERPL-MCNC: 91 MG/DL (ref 74–99)
GLUCOSE UR STRIP.AUTO-MCNC: NEGATIVE MG/DL
HCT VFR BLD AUTO: 37.6 % (ref 36–46)
HGB BLD-MCNC: 12.6 G/DL (ref 12–16)
HOLD SPECIMEN: NORMAL
IMM GRANULOCYTES # BLD AUTO: 0.07 X10*3/UL (ref 0–0.5)
IMM GRANULOCYTES NFR BLD AUTO: 0.8 % (ref 0–0.9)
KETONES UR STRIP.AUTO-MCNC: NEGATIVE MG/DL
LEUKOCYTE ESTERASE UR QL STRIP.AUTO: ABNORMAL
LYMPHOCYTES # BLD AUTO: 3.51 X10*3/UL (ref 0.8–3)
LYMPHOCYTES NFR BLD AUTO: 40.8 %
MCH RBC QN AUTO: 32.1 PG (ref 26–34)
MCHC RBC AUTO-ENTMCNC: 33.5 G/DL (ref 32–36)
MCV RBC AUTO: 96 FL (ref 80–100)
MONOCYTES # BLD AUTO: 0.88 X10*3/UL (ref 0.05–0.8)
MONOCYTES NFR BLD AUTO: 10.2 %
NEUTROPHILS # BLD AUTO: 3.94 X10*3/UL (ref 1.6–5.5)
NEUTROPHILS NFR BLD AUTO: 45.9 %
NITRITE UR QL STRIP.AUTO: NEGATIVE
NRBC BLD-RTO: 0 /100 WBCS (ref 0–0)
OPIATES UR QL SCN: NORMAL
OXYCODONE+OXYMORPHONE UR QL SCN: NORMAL
PCP UR QL SCN: NORMAL
PH UR STRIP.AUTO: 8 [PH]
PLATELET # BLD AUTO: 229 X10*3/UL (ref 150–450)
PMV BLD AUTO: 11.4 FL (ref 7.5–11.5)
POTASSIUM SERPL-SCNC: 4.4 MMOL/L (ref 3.5–5.3)
PROT SERPL-MCNC: 8.7 G/DL (ref 6.4–8.2)
PROT UR STRIP.AUTO-MCNC: NEGATIVE MG/DL
RBC # BLD AUTO: 3.93 X10*6/UL (ref 4–5.2)
RBC # UR STRIP.AUTO: NEGATIVE /UL
RBC #/AREA URNS AUTO: ABNORMAL /HPF
SALICYLATES SERPL-MCNC: <3 MG/DL
SARS-COV-2 RNA RESP QL NAA+PROBE: DETECTED
SODIUM SERPL-SCNC: 133 MMOL/L (ref 136–145)
SP GR UR STRIP.AUTO: 1
SQUAMOUS #/AREA URNS AUTO: ABNORMAL /HPF
UROBILINOGEN UR STRIP.AUTO-MCNC: <2 MG/DL
WBC # BLD AUTO: 8.6 X10*3/UL (ref 4.4–11.3)
WBC #/AREA URNS AUTO: >50 /HPF

## 2023-10-26 PROCEDURE — 99285 EMERGENCY DEPT VISIT HI MDM: CPT

## 2023-10-26 PROCEDURE — 85025 COMPLETE CBC W/AUTO DIFF WBC: CPT | Performed by: STUDENT IN AN ORGANIZED HEALTH CARE EDUCATION/TRAINING PROGRAM

## 2023-10-26 PROCEDURE — 87086 URINE CULTURE/COLONY COUNT: CPT | Mod: AHULAB | Performed by: STUDENT IN AN ORGANIZED HEALTH CARE EDUCATION/TRAINING PROGRAM

## 2023-10-26 PROCEDURE — 1090000002 HH PPS REVENUE DEBIT

## 2023-10-26 PROCEDURE — 1090000001 HH PPS REVENUE CREDIT

## 2023-10-26 PROCEDURE — 80053 COMPREHEN METABOLIC PANEL: CPT | Performed by: STUDENT IN AN ORGANIZED HEALTH CARE EDUCATION/TRAINING PROGRAM

## 2023-10-26 PROCEDURE — 87635 SARS-COV-2 COVID-19 AMP PRB: CPT | Performed by: STUDENT IN AN ORGANIZED HEALTH CARE EDUCATION/TRAINING PROGRAM

## 2023-10-26 PROCEDURE — 2500000001 HC RX 250 WO HCPCS SELF ADMINISTERED DRUGS (ALT 637 FOR MEDICARE OP): Performed by: STUDENT IN AN ORGANIZED HEALTH CARE EDUCATION/TRAINING PROGRAM

## 2023-10-26 PROCEDURE — 80307 DRUG TEST PRSMV CHEM ANLYZR: CPT | Performed by: STUDENT IN AN ORGANIZED HEALTH CARE EDUCATION/TRAINING PROGRAM

## 2023-10-26 PROCEDURE — 36415 COLL VENOUS BLD VENIPUNCTURE: CPT | Performed by: STUDENT IN AN ORGANIZED HEALTH CARE EDUCATION/TRAINING PROGRAM

## 2023-10-26 PROCEDURE — 81001 URINALYSIS AUTO W/SCOPE: CPT | Performed by: STUDENT IN AN ORGANIZED HEALTH CARE EDUCATION/TRAINING PROGRAM

## 2023-10-26 PROCEDURE — 99284 EMERGENCY DEPT VISIT MOD MDM: CPT | Performed by: STUDENT IN AN ORGANIZED HEALTH CARE EDUCATION/TRAINING PROGRAM

## 2023-10-26 PROCEDURE — 80329 ANALGESICS NON-OPIOID 1 OR 2: CPT | Performed by: STUDENT IN AN ORGANIZED HEALTH CARE EDUCATION/TRAINING PROGRAM

## 2023-10-26 RX ORDER — CEPHALEXIN 500 MG/1
500 CAPSULE ORAL 2 TIMES DAILY
Qty: 10 CAPSULE | Refills: 0 | Status: SHIPPED | OUTPATIENT
Start: 2023-10-26 | End: 2023-10-31

## 2023-10-26 RX ORDER — LORAZEPAM 1 MG/1
1 TABLET ORAL ONCE
Status: COMPLETED | OUTPATIENT
Start: 2023-10-26 | End: 2023-10-26

## 2023-10-26 RX ORDER — CEPHALEXIN 500 MG/1
500 CAPSULE ORAL ONCE
Status: COMPLETED | OUTPATIENT
Start: 2023-10-26 | End: 2023-10-26

## 2023-10-26 RX ADMIN — CEPHALEXIN 500 MG: 500 CAPSULE ORAL at 09:18

## 2023-10-26 RX ADMIN — LORAZEPAM 1 MG: 1 TABLET ORAL at 07:44

## 2023-10-26 SDOH — HEALTH STABILITY: MENTAL HEALTH: ANXIETY SYMPTOMS: GENERALIZED;PANIC ATTACK;FEELINGS OF DOOM

## 2023-10-26 SDOH — HEALTH STABILITY: MENTAL HEALTH: SUICIDAL BEHAVIOR (LIFETIME): NO

## 2023-10-26 SDOH — HEALTH STABILITY: MENTAL HEALTH: IN THE PAST WEEK, HAVE YOU BEEN HAVING THOUGHTS ABOUT KILLING YOURSELF?: NO

## 2023-10-26 SDOH — HEALTH STABILITY: MENTAL HEALTH: WISH TO BE DEAD (PAST 1 MONTH): NO

## 2023-10-26 SDOH — ECONOMIC STABILITY: HOUSING INSECURITY: FEELS SAFE LIVING IN HOME: NO

## 2023-10-26 SDOH — HEALTH STABILITY: MENTAL HEALTH: ARE YOU HAVING THOUGHTS OF KILLING YOURSELF RIGHT NOW?: NO

## 2023-10-26 SDOH — HEALTH STABILITY: MENTAL HEALTH: IN THE PAST FEW WEEKS, HAVE YOU FELT THAT YOU OR YOUR FAMILY WOULD BE BETTER OFF IF YOU WERE DEAD?: YES

## 2023-10-26 SDOH — HEALTH STABILITY: MENTAL HEALTH: IN THE PAST FEW WEEKS, HAVE YOU WISHED YOU WERE DEAD?: YES

## 2023-10-26 SDOH — HEALTH STABILITY: MENTAL HEALTH: NON-SPECIFIC ACTIVE SUICIDAL THOUGHTS (PAST 1 MONTH): NO

## 2023-10-26 SDOH — HEALTH STABILITY: MENTAL HEALTH
DEPRESSION SYMPTOMS: FEELINGS OF HELPLESSNESS;FEELINGS OF HOPELESSESS;APPETITE CHANGE;ISOLATIVE;LOSS OF INTEREST;SLEEP DISTURBANCE

## 2023-10-26 ASSESSMENT — LIFESTYLE VARIABLES
SUBSTANCE_ABUSE_PAST_12_MONTHS: NO
PRESCIPTION_ABUSE_PAST_12_MONTHS: NO

## 2023-10-26 ASSESSMENT — COLUMBIA-SUICIDE SEVERITY RATING SCALE - C-SSRS
2. HAVE YOU ACTUALLY HAD ANY THOUGHTS OF KILLING YOURSELF?: NO
1. IN THE PAST MONTH, HAVE YOU WISHED YOU WERE DEAD OR WISHED YOU COULD GO TO SLEEP AND NOT WAKE UP?: NO
6. HAVE YOU EVER DONE ANYTHING, STARTED TO DO ANYTHING, OR PREPARED TO DO ANYTHING TO END YOUR LIFE?: NO

## 2023-10-26 ASSESSMENT — PAIN SCALES - GENERAL: PAINLEVEL_OUTOF10: 0 - NO PAIN

## 2023-10-26 ASSESSMENT — PAIN - FUNCTIONAL ASSESSMENT: PAIN_FUNCTIONAL_ASSESSMENT: 0-10

## 2023-10-26 NOTE — ED PROVIDER NOTES
EMERGENCY MEDICINE EVALUATION NOTE    History of Present Illness     Chief Complaint:   Chief Complaint   Patient presents with    Depression       HPI: Qi Davila is a 82 y.o. female with past medical history of depression, anxiety, and insomnia who presents with complaint of depression and anxiety.  Patient states she has changed her medications around frequently for depression and anxiety.  She states she recently started sertraline and believes it is not helping.  She does admit worsening anxiety over the past several days as well as depression.  She states she needs a new psychiatrist and came or to be evaluated for possible medication changes and improvement of her psychiatric symptoms.  Patient is severely anxious which is limiting the current history.  She does note worsening depression although denies any thoughts of self-harm.  Denies any SI or HI.  Denies any hallucinations.  Denies any fever, chills, chest pain, shortness of breath.    Previous History     Past Medical History:   Diagnosis Date    Personal history of other mental and behavioral disorders     History of psychiatric hospitalization    Personal history of other specified conditions 01/10/2022    History of insomnia     Past Surgical History:   Procedure Laterality Date    MR HEAD ANGIO WO IV CONTRAST  1/5/2012    MR HEAD ANGIO WO IV CONTRAST 1/5/2012 [facility] CLINICAL LEGACY     Social History     Tobacco Use    Smoking status: Never    Smokeless tobacco: Never     No family history on file.  Allergies   Allergen Reactions    Hydrocodone Unknown    Hydrocodone-Acetaminophen Unknown    Latex Unknown    Penicillins Unknown    Prednisone Unknown     Current Outpatient Medications   Medication Instructions    Alphagan P 0.1 % ophthalmic solution 1 drop, Both Eyes, Every 12 hours    aspirin 81 mg, oral, Daily    cartilage/collagen/bor/hyalur (JOINT HEALTH ORAL) 4 capsules, oral, 3 times daily    cholecalciferol (VITAMIN D3) 2,000 Units, oral,  Daily    ciclopirox 1 % shampoo 1 Application, Topical, Nightly    clobetasol (Temovate) 0.05 % external solution 1 Application, Topical, 2 times daily    clotrimazole-betamethasone (Lotrisone) cream 1 Application, Topical, 2 times daily    dexlansoprazole (Dexilant) 30 mg DR capsule 1 capsule, oral, Daily    famciclovir (FAMVIR) 500 mg, oral, Once as needed, With oubreak    fluticasone (Flonase) 50 mcg/actuation nasal spray 1 spray, Each Nostril, Daily    latanoprost (Xalatan) 0.005 % ophthalmic solution 1 drop, Both Eyes, Nightly    levothyroxine (Synthroid, Levoxyl) 25 mcg tablet 1 tablet, oral, Daily    melatonin 6 mg, oral, Nightly    polyethylene glycol (Miralax) packet 17 g, oral, Daily    propranolol (Inderal) 20 mg tablet 1 tablet, oral, 2 times daily    QUEtiapine (SEROQUEL) 25 mg, oral, Nightly    Restasis 0.05 % ophthalmic emulsion 1 drop, Both Eyes, Every 12 hours    sertraline (ZOLOFT) 100 mg, oral, Daily    tamsulosin (FLOMAX) 0.4 mg, oral, Nightly    timolol (Timoptic) 0.5 % ophthalmic solution 1 drop, Both Eyes, Daily       Physical Exam     Appearance: Alert, oriented , cooperative,  in acute distress. Well nourished & well hydrated.     Skin: Intact,  dry skin, no lesions, rash, petechiae or purpura.      Eyes: PERRLA, EOMs intact,  Conjunctiva pink with no redness or exudates. Cornea & anterior chamber are clear, Eyelids without lesions. No scleral icterus.      ENT: Hearing grossly intact. External auditory canals patent, tympanic membranes intact with visible landmarks. Nares patent, mucus membranes moist. Dentition without lesions. Pharynx clear, uvula midline.      Neck: Supple, without meningismus. Thyroid not palpable. Trachea at midline. No lymphadenopathy.     Pulmonary: Clear bilaterally with good chest wall excursion. No rales, rhonchi or wheezing. No accessory muscle use or stridor.     Cardiac: Normal S1, S2 without murmur, rub, gallop or extrasystole. No JVD, Carotids without  bruits.     Abdomen: Soft, nontender, active bowel sounds.  No palpable organomegaly.  No rebound or guarding.  No CVA tenderness.     Genitourinary: Exam deferred.     Musculoskeletal: Full range of motion. no pain, edema, or deformity. Pulses full and equal. No cyanosis or clubbing.      Neurological:  Cranial nerves II through XII are grossly intact, finger-nose touch is normal, normal sensation, no weakness, no focal findings identified.     Psychiatric: Severe anxious mood and elevated affect.     Results     Labs Reviewed   CBC WITH AUTO DIFFERENTIAL - Abnormal       Result Value    WBC 8.6      nRBC 0.0      RBC 3.93 (*)     Hemoglobin 12.6      Hematocrit 37.6      MCV 96      MCH 32.1      MCHC 33.5      RDW 14.9 (*)     Platelets 229      MPV 11.4      Neutrophils % 45.9      Immature Granulocytes %, Automated 0.8      Lymphocytes % 40.8      Monocytes % 10.2      Eosinophils % 1.6      Basophils % 0.7      Neutrophils Absolute 3.94      Immature Granulocytes Absolute, Automated 0.07      Lymphocytes Absolute 3.51 (*)     Monocytes Absolute 0.88 (*)     Eosinophils Absolute 0.14      Basophils Absolute 0.06     COMPREHENSIVE METABOLIC PANEL - Abnormal    Glucose 91      Sodium 133 (*)     Potassium 4.4      Chloride 101      Bicarbonate 22      Anion Gap 14      Urea Nitrogen 10      Creatinine 0.56      eGFR 90      Calcium 8.6      Albumin 3.6      Alkaline Phosphatase 58      Total Protein 8.7 (*)     AST 31      Bilirubin, Total 0.5      ALT 17     SARS-COV-2 PCR, SCREEN ASYMPTOMATIC - Abnormal    Coronavirus 2019, PCR Detected (*)     Narrative:     This assay has received FDA Emergency Use Authorization (EUA) and is only authorized for the duration of time that circumstances exist to justify the authorization of the emergency use of in vitro diagnostic tests for the detection of SARS-CoV-2 virus and/or diagnosis of COVID-19 infection under section 564(b)(1) of the Act, 21 U.S.C. 360bbb-3(b)(1). This  assay is an in vitro diagnostic nucleic acid amplification test for the qualitative detection of SARS-CoV-2 from nasopharyngeal specimens and has been validated for use at [facility]. Negative results do not preclude COVID-19 infections and should not be used as the sole basis for diagnosis, treatment, or other management decisions.     URINALYSIS WITH REFLEX MICROSCOPIC AND CULTURE - Abnormal    Color, Urine Straw      Appearance, Urine Clear      Specific Gravity, Urine 1.003 (*)     pH, Urine 8.0      Protein, Urine NEGATIVE      Glucose, Urine NEGATIVE      Blood, Urine NEGATIVE      Ketones, Urine NEGATIVE      Bilirubin, Urine NEGATIVE      Urobilinogen, Urine <2.0      Nitrite, Urine NEGATIVE      Leukocyte Esterase, Urine LARGE (3+) (*)    MICROSCOPIC ONLY, URINE - Abnormal    WBC, Urine >50 (*)     RBC, Urine NONE      Squamous Epithelial Cells, Urine 1-9 (SPARSE)      Bacteria, Urine 1+ (*)    DRUG SCREEN,URINE - Normal    Amphetamine Screen, Urine Presumptive Negative      Barbiturate Screen, Urine Presumptive Negative      Benzodiazepines Screen, Urine Presumptive Negative      Cannabinoid Screen, Urine Presumptive Negative      Cocaine Metabolite Screen, Urine Presumptive Negative      Fentanyl Screen, Urine Presumptive Negative      Opiate Screen, Urine Presumptive Negative      Oxycodone Screen, Urine Presumptive Negative      PCP Screen, Urine Presumptive Negative      Narrative:     Drug screen results are presumptive and should not be used to assess   compliance with prescribed medication. Contact the performing [facility] laboratory   to add-on definitive confirmatory testing if clinically indicated.    Toxicology screening results are reported qualitatively. The concentration must   be greater than or equal to the cutoff to be reported as positive. The concentration   at which the screening test can detect an individual drug or metabolite varies.   The absence of expected  "drug(s) and/or drug metabolite(s) may indicate non-compliance,   inappropriate timing of specimen collection relative to drug administration, poor drug   absorption, diluted/adulterated urine, or limitations of testing. For medical purposes   only; not valid for forensic use.    Interpretive questions should be directed to the laboratory medical directors.   ACUTE TOXICOLOGY PANEL, BLOOD - Normal    Acetaminophen <10.0      Salicylate  <3      Alcohol <10     URINE CULTURE   URINALYSIS WITH REFLEX MICROSCOPIC AND CULTURE    Narrative:     The following orders were created for panel order Urinalysis with Reflex Microscopic and Culture.  Procedure                               Abnormality         Status                     ---------                               -----------         ------                     Urinalysis with Reflex M...[[id number]]  Abnormal            Final result               Extra Urine Gray Tube[[id number]]                            Final result                 Please view results for these tests on the individual orders.   EXTRA URINE GRAY TUBE    Extra Tube Hold for add-ons.       No orders to display         ED Course & Medical Decision Making     Medications   LORazepam (Ativan) tablet 1 mg (1 mg oral Given 10/26/23 0744)   cephalexin (Keflex) capsule 500 mg (500 mg oral Given 10/26/23 0918)     Diagnoses as of 10/26/23 0942   Anxiety and depression   Acute UTI     Heart Rate:  [98]   Temp:  [36.6 °C (97.8 °F)]   Resp:  [20]   BP: (178)/(108)   Height:  [167.6 cm (5' 6\")]   Weight:  [83.5 kg (184 lb)]   SpO2:  [96 %]      Qi Davila is a 82 y.o. female with past medical history of depression, anxiety, and insomnia who presents with complaint of depression and anxiety.  Patient was initially hypertensive with a /108, afebrile, nontachycardic, and saturating 96% on room air.  Patient is with severe anxiety and depression noted at bedside.  No active SI or HI.  Patient was given oral " Ativan for anxiolysis.  EPAT team was consulted.  No significant leukocytosis or anemia noted on lab work.  No significant renal dysfunction or electrolyte abnormality.  Glucose normal.  UA did show evidence of urinary tract infection with large leukocyte esterase.  Acute toxicology panel was negative.  Patient did test positive for COVID-19 although had a positive test in October 1 and is asymptomatic currently.  Most likely residual.  EPAT team did see the patient did recommend admission to an inpatient psychiatric center.  She was treated with oral Keflex for her UTI.  She was informed these findings and is medically clear from my standpoint for psychiatric admission at this time and we will have her placed in a psychiatric facility for her worsening depression and anxiety as she does live alone.    Procedures   Procedures    Diagnosis     1. Anxiety and depression    2. Acute UTI        Disposition     Transfer to psychiatric facility for further management.    ED Prescriptions    None            Natan Madrid DO  10/26/23 0943

## 2023-10-26 NOTE — SIGNIFICANT EVENT
Application for Emergency Admission      Ready for Transfer?  Is the patient medically cleared for transfer to inpatient psychiatry: Yes  Has the patient been accepted to an inpatient psychiatric hospital: Yes    Application for Emergency Admission  IN ACCORDANCE WITH [place]  The Chief Clinical Officer of: Reba Jaimes 10/26/2023 .12:04 PM    Reason for Hospitalization  The undersigned has reason to believe that: Qi Davila Is a mentally ill person subject to hospitalization by court order under division B Section 5122.01 of the Revised Code, i.e., this person:    1.Yes  Represents a substantial risk of physical harm to self as manifested by evidence of threats of, or attempts at, suicide or serious self-inflicted bodily harm    2.No Represents a substantial risk of physical harm to others as manifested by evidence of recent homicidal or other violent behavior, evidence of recent threats that place another in reasonable fear of violent behavior and serious physical harm, or other evidence of present dangerousness    3.Yes Represents a substantial and immediate risk of serious physical impairment or injury to self as manifested by  evidence that the person is unable to provide for and is not providing for the person's basic physical needs because of the person's mental illness and that appropriate provision for those needs cannot be made  immediately available in the community    4.Yes Would benefit from treatment in a hospital for his mental illness and is in need of such treatment as manifested by evidence of behavior that creates a grave and imminent risk to substantial rights of others or  himself.    5.Yes Would benefit from treatment as manifested by evidence of behavior that indicates all of the following:       (a) The person is unlikely to survive safely in the community without supervision, based on a clinical determination.       (b) The person has a history of lack of  compliance with treatment for mental illness and one of the following applies:      (i) At least twice within the thirty-six months prior to the filing of an affidavit seeking court-ordered treatment of the person under section 5122.111 of the Revised Code, the lack of compliance has been a significant factor in necessitating hospitalization in a hospital or receipt of services in a forensic or other mental health unit of a correctional facility, provided that the thirty-six-month period shall be extended by the length of any hospitalization or incarceration of the person that occurred within the thirty-six-month period.      (ii) Within the forty-eight months prior to the filing of an affidavit seeking court-ordered treatment of the person under section 5122.111 of the Revised Code, the lack of compliance resulted in one or more acts of serious violent behavior toward self or others or threats of, or attempts at, serious physical harm to self or others, provided that the forty-eight-month period shall be extended by the length of any hospitalization or incarceration of the person that occurred within the forty-eight-month period.      (c) The person, as a result of mental illness, is unlikely to voluntarily participate in necessary treatment.       (d) In view of the person's treatment history and current behavior, the person is in need of treatment in order to prevent a relapse or deterioration that would be likely to result in substantial risk of serious harm to the person or others.    (e) Represents a substantial risk of physical harm to self or others if allowed to remain at liberty pending examination.    Therefore, it is requested that said person be admitted to the above named facility.    STATEMENT OF BELIEF    Must be filled out by one of the following: a psychiatrist, licensed physician, licensed clinical psychologist, health or ,  or .  (Statement shall include the  circumstances under which the individual was taken into custody and the reason for the person's belief that hospitalization is necessary. The statement shall also include a reference to efforts made to secure the individual's property at his residence if he was taken into custody there. Every reasonable and appropriate effort should be made to take this person into custody in the least conspicuous manner possible.)    Patient is severely anxious at bedside and lives alone.  She states she is unable to take care of herself in her current condition.  Also expressing severe depression and her medication not working.    Natan Madrid DO 10/26/2023     _____________________________________________________________   Place of Employment: [employer]    STATEMENT OF OBSERVATION BY PSYCHIATRIST, LICENSED PHYSICIAN, OR LICENSED CLINICAL PSYCHOLOGIST, IF APPLICABLE    Place of Observation (e.g., [facility] mental health center, general hospital, office, emergency facility)  (If applicable, please complete)    Natan Madrid DO 10/26/2023    _____________________________________________________________

## 2023-10-26 NOTE — ED TRIAGE NOTES
Pt arrived to the Ed via [facility] with a chief complaint of depression. Pt wants her meds change because they are not working. Pt agitated and tearful upon arrival. Denies SI/HI or audio/visual hallucinations. No signs of self harm, pt is not combative.

## 2023-10-26 NOTE — PROGRESS NOTES
"EPAT - Social Work Psychiatric Assessment    Arrival Details  Mode of Arrival: Ambulance  Admission Source: Home  Admission Type: Voluntary  EPAT Assessment Start Date: 10/26/23  EPAT Assessment Start Time: 0845  Name of : Cruz Smitheen    History of Present Illness  Admission Reason: Suicidal Ideation and Anxiety  HPI: Patient is a 82 year old female with a history of Anxiety, Depression who called EMS due to increased symptoms. The patient reports she has had several medication changes in the past few months and \"they are not working\". She did not deny or confirm any suicidal thoughts but avoided the questions. She denied any homicidal thoughts or hallucinations. A review of her Provider note was conducted.    SW Readmission Information   Readmission within 30 Days: No    Additional Symptoms - Adult  Generalized Anxiety Disorder: Difficult to control worry, Excessive anxiety/worry, Restlessness, Sleep disturbance  Obsessive Compulsive Disorder: No problems reported or observed.  Panic Attack: No problems reported or observed.  Post Traumatic Stress Disorder: No problems reported or observed.  Delirium: No problems reported or observed.  Review of Symptoms Comments: Patient presents with several anxiety and depressive symptoms.    Past Psychiatric History/Meds/Treatments  Past Psychiatric History: Patient currently sees a Psychiatrist virtually. She currently attends all psychiatric and medical providers. She has no known history of suicide attempts but could not deny or confirm thoughts today. It is unknown any family history.  Past Psychiatric Meds/Treatments: see med list. Patient reports taking her medications as prescribed.  Past Violence/Victimization History: none    Current Mental Health Contacts   Name/Phone Number: none   Last Appointment Date: none  Provider Name/Phone Number: Marina [facility]Cmeron/[facility]. [phone]  Provider Last Appointment Date: " 10/24/23    Support System: Immediate family    Living Arrangement: Apartment    Home Safety  Feels Safe Living in Home: No  Potentially Unsafe Housing Conditions: Unable to Assess    Income Information  Employment Status for: Patient  Employment Status: Retired  Income Source: Government aid  Current/Previous Occupation: Unable to Assess    [place] Service/Education History  Current or Previous  Service: None    Social/Cultural History  Social History: Patient is her own guardian. Her stressors are her depression and anxiety.  Important Activities:  (I used to do things)    Legal  Legal Concerns: none    Drug Screening  Have you used any substances (canabis, cocaine, heroin, hallucinogens, inhalants, etc.) in the past 12 months?: No  Have you used any prescription drugs other than prescribed in the past 12 months?: No  Is a toxicology screen needed?: No         Psychosocial  Psychosocial (WDL): Exceptions to WDL  Behaviors/Mood: Anxious, Fearful, Sad, Restless, Tearful, Withdrawn  Affect: Inconsistent with mood  Parent/Guardian/Significant Other Involvement: No involvement    Orientation  Orientation Level: Oriented X4    General Appearance  Motor Activity: Unremarkable  Speech Pattern: Repetitive  General Attitude: Unable to assess  Appearance/Hygiene: Disheveled, Poor hygiene    Thought Process  Coherency: Unable to assess  Content: Unremarkable  Delusions: Other (Comment)  Perception: Not altered  Hallucination: None  Judgment/Insight: Limited  Confusion: Mild  Cognition: Unable to assess    Sleep Pattern  Sleep Pattern: Difficulty falling asleep    Risk Factors  Self Harm/Suicidal Ideation Plan: did not respond  Previous Self Harm/Suicidal Plans: Did not respond  Risk Factors: Age < 19 years old, Major mental illness    Violence Risk Assessment  Thoughts of Harm to Others: No    Ability to Assess Risk Screen  Risk Screen - Ability to Assess: Able to be screened  Ask Suicide-Screening Questions  1. In  "the past few weeks, have you wished you were dead?: Yes  2. In the past few weeks, have you felt that you or your family would be better off if you were dead?: Yes  3. In the past week, have you been having thoughts about killing yourself?: No  5. Are you having thoughts of killing yourself right now?: No  Calculated Risk Score: Potential Risk  [facility] Suicide Severity Rating Scale (Screener/Recent Self-Report)  1. Wish to be Dead (Past 1 Month): No  2. Non-Specific Active Suicidal Thoughts (Past 1 Month): No  6. Suicidal Behavior (Lifetime): No  Calculated C-SSRS Risk Score (Lifetime/Recent): No Risk Indicated  Step 1: Risk Factors  Current & Past Psychiatric Dx: Mood disorder  Presenting Symptoms: Anxiety and/or panic, Hopelessness or despair  Step 2: Protective Factors   Protective Factors Internal: Other (Comment)  Protective Factors External: Other (Comment)  Step 3: Suicidal Ideation Intensity  How Many Times Have You Had These Thoughts: Once a week  When You Have the Thoughts How Long do They Last : Less than 1 hour/some of the time  Could/Can You Stop Thinking About Killing Yourself or Wanting to Die if You Want to: Can control thoughts with little difficulty  Are There Things - Anyone or Anything - That Stopped You From Wanting to Die or Acting on: Deterrents probably stopped you  What Sort of Reasons Did You Have For Thinking About Wanting to Die or Killing Yourself: Mostly to get attention, revenge, or a reaction from others  Total Score: 10  Step 5: Documentation  Risk Level: High suicide risk    Psychiatric Impression and Plan of Care  Assessment and Plan: Patient is a 82 year old female with a history of depression and Anxiety who called EMS this morning due to her anxiety and feeling hopeless. The patient described \"three months of this\". She shared she has not been able to sleep or eat. She shared there have been recent medications changes but \"they are not working\". The patient has a history of " "inpatient stays for similar presentations. She reports chronic Severe depression. When asked about current suicidal thoughts or plans she did not respond and/or avoided the question. This was also the reaction when asked about past attempts. The patient lives alone with minimal supports. She reports only sleeping 4 hours per night and waking up with \"extreme anxiety\", panicing and trouble breathing. The patient has poor insight/judgement and eye contact. She was last inpatient 11 years ago. The patient denied any homicidal thoughts or hallucinations. She is  high risk based on her current presentation. She was guarded and a limited historian. Dr. Madrid agrees with the risk level.                                   Spoke with the patient's daughter. She described her mothers worsening depression. She shared 2-3 months ago she \"went from independent to the opposite\". She shared her mother has stopped caring for herself. She does not shower or complete any hygiene. She has stopped buying groceries \" and when she does have them she is not eating\". She shared she has \"no energy and am worried she does not care\". She is her mother's only supports that has consistent contact with her. She confirmed the patient's psychiatrist has made several medication changes the past month and feels this has worsened her depression.  Specific Resources Provided to Patient: see above  CM Notified: no  PHP/IOP Recommended: none  Specific Information Provided for PHP/IOP: none  Plan Comments: none    Outcome/Disposition  Patient's Perception of Outcome Achieved: n/a  Assessment, Recommendations and Risk Level Reviewed with: Dr. Natan Madrid  Contact Name: Paola Davila  Contact Number(s): [phone]  Contact Relationship: daughter  EPAT Assessment Completed Date: 10/26/23  EPAT Assessment Completed Time: 1003    Social Work Note  "

## 2023-10-27 ENCOUNTER — APPOINTMENT (OUTPATIENT)
Dept: HOME HEALTH SERVICES | Facility: HOME HEALTH | Age: 83
End: 2023-10-27
Payer: MEDICARE

## 2023-10-27 ENCOUNTER — DOCUMENTATION (OUTPATIENT)
Dept: CARE COORDINATION | Facility: CLINIC | Age: 83
End: 2023-10-27
Payer: MEDICARE

## 2023-10-27 ENCOUNTER — HOME CARE VISIT (OUTPATIENT)
Dept: HOME HEALTH SERVICES | Facility: HOME HEALTH | Age: 83
End: 2023-10-27
Payer: MEDICARE

## 2023-10-27 PROCEDURE — 1090000002 HH PPS REVENUE DEBIT

## 2023-10-27 PROCEDURE — 1090000001 HH PPS REVENUE CREDIT

## 2023-10-27 NOTE — PROGRESS NOTES
Admitted to inpatient psychiatric facility from ED 10/26/23.    Dot FORD RN CCM  RN Care Coordinator  [facility] Population [facility]  Phone [phone]

## 2023-10-28 LAB — BACTERIA UR CULT: ABNORMAL

## 2023-10-28 PROCEDURE — 1090000001 HH PPS REVENUE CREDIT

## 2023-10-28 PROCEDURE — 1090000002 HH PPS REVENUE DEBIT

## 2023-10-29 PROCEDURE — 1090000002 HH PPS REVENUE DEBIT

## 2023-10-29 PROCEDURE — 1090000001 HH PPS REVENUE CREDIT

## 2023-10-30 ENCOUNTER — HOME CARE VISIT (OUTPATIENT)
Dept: HOME HEALTH SERVICES | Facility: HOME HEALTH | Age: 83
End: 2023-10-30
Payer: MEDICARE

## 2023-10-30 LAB
T4 SERPL-MCNC: 6.9 UG/DL (ref 4.5–11.7)
TSH SERPL DL<=0.05 MIU/L-ACNC: 2.04 UIU/ML (ref 0.27–4.2)

## 2023-10-30 PROCEDURE — 1090000002 HH PPS REVENUE DEBIT

## 2023-10-30 PROCEDURE — 1090000001 HH PPS REVENUE CREDIT

## 2023-10-31 ENCOUNTER — HOME CARE VISIT (OUTPATIENT)
Dept: HOME HEALTH SERVICES | Facility: HOME HEALTH | Age: 83
End: 2023-10-31
Payer: MEDICARE

## 2023-10-31 ENCOUNTER — APPOINTMENT (OUTPATIENT)
Dept: HOME HEALTH SERVICES | Facility: HOME HEALTH | Age: 83
End: 2023-10-31
Payer: MEDICARE

## 2023-10-31 PROCEDURE — 1090000001 HH PPS REVENUE CREDIT

## 2023-10-31 PROCEDURE — 1090000002 HH PPS REVENUE DEBIT

## 2023-11-01 ENCOUNTER — APPOINTMENT (OUTPATIENT)
Dept: HOME HEALTH SERVICES | Facility: HOME HEALTH | Age: 83
End: 2023-11-01
Payer: MEDICARE

## 2023-11-01 ENCOUNTER — HOME CARE VISIT (OUTPATIENT)
Dept: HOME HEALTH SERVICES | Facility: HOME HEALTH | Age: 83
End: 2023-11-01
Payer: MEDICARE

## 2023-11-01 PROCEDURE — 1090000001 HH PPS REVENUE CREDIT

## 2023-11-01 PROCEDURE — 1090000002 HH PPS REVENUE DEBIT

## 2023-11-02 PROCEDURE — 1090000002 HH PPS REVENUE DEBIT

## 2023-11-02 PROCEDURE — 1090000001 HH PPS REVENUE CREDIT

## 2023-11-03 PROCEDURE — 1090000002 HH PPS REVENUE DEBIT

## 2023-11-03 PROCEDURE — 1090000001 HH PPS REVENUE CREDIT

## 2023-11-04 PROCEDURE — 1090000002 HH PPS REVENUE DEBIT

## 2023-11-04 PROCEDURE — 1090000001 HH PPS REVENUE CREDIT

## 2023-11-05 PROCEDURE — 1090000002 HH PPS REVENUE DEBIT

## 2023-11-05 PROCEDURE — 1090000001 HH PPS REVENUE CREDIT

## 2023-11-06 LAB
ALBUMIN SERPL-MCNC: 3.7 G/DL (ref 3.5–5.2)
ALP SERPL-CCNC: 75 U/L (ref 35–104)
ALT SERPL-CCNC: 11 U/L (ref 0–32)
ANION GAP SERPL CALCULATED.3IONS-SCNC: 12 MMOL/L (ref 7–16)
AST SERPL-CCNC: 22 U/L (ref 0–31)
BASOPHILS # BLD: 0.06 K/UL (ref 0–0.2)
BASOPHILS NFR BLD: 1 % (ref 0–2)
BILIRUB SERPL-MCNC: 0.5 MG/DL (ref 0–1.2)
BUN SERPL-MCNC: 11 MG/DL (ref 6–23)
CALCIUM SERPL-MCNC: 9.3 MG/DL (ref 8.6–10.2)
CHLORIDE SERPL-SCNC: 103 MMOL/L (ref 98–107)
CO2 SERPL-SCNC: 24 MMOL/L (ref 22–29)
CREAT SERPL-MCNC: 0.5 MG/DL (ref 0.5–1)
EOSINOPHIL # BLD: 0.1 K/UL (ref 0.05–0.5)
EOSINOPHILS RELATIVE PERCENT: 1 % (ref 0–6)
ERYTHROCYTE [DISTWIDTH] IN BLOOD BY AUTOMATED COUNT: 14.8 % (ref 11.5–15)
GFR SERPL CREATININE-BSD FRML MDRD: >60 ML/MIN/1.73M2
GLUCOSE SERPL-MCNC: 106 MG/DL (ref 74–99)
HCT VFR BLD AUTO: 37 % (ref 34–48)
HGB BLD-MCNC: 12.2 G/DL (ref 11.5–15.5)
IMM GRANULOCYTES # BLD AUTO: <0.03 K/UL (ref 0–0.58)
IMM GRANULOCYTES NFR BLD: 0 % (ref 0–5)
LYMPHOCYTES NFR BLD: 2.49 K/UL (ref 1.5–4)
LYMPHOCYTES RELATIVE PERCENT: 33 % (ref 20–42)
MCH RBC QN AUTO: 31.8 PG (ref 26–35)
MCHC RBC AUTO-ENTMCNC: 33 G/DL (ref 32–34.5)
MCV RBC AUTO: 96.4 FL (ref 80–99.9)
MONOCYTES NFR BLD: 0.74 K/UL (ref 0.1–0.95)
MONOCYTES NFR BLD: 10 % (ref 2–12)
NEUTROPHILS NFR BLD: 55 % (ref 43–80)
NEUTS SEG NFR BLD: 4.05 K/UL (ref 1.8–7.3)
PLATELET # BLD AUTO: 242 K/UL (ref 130–450)
PMV BLD AUTO: 12 FL (ref 7–12)
POTASSIUM SERPL-SCNC: 3.8 MMOL/L (ref 3.5–5)
PROT SERPL-MCNC: 9 G/DL (ref 6.4–8.3)
RBC # BLD AUTO: 3.84 M/UL (ref 3.5–5.5)
SODIUM SERPL-SCNC: 139 MMOL/L (ref 132–146)
WBC OTHER # BLD: 7.5 K/UL (ref 4.5–11.5)

## 2023-11-06 PROCEDURE — 1090000001 HH PPS REVENUE CREDIT

## 2023-11-06 PROCEDURE — 1090000002 HH PPS REVENUE DEBIT

## 2023-11-06 ASSESSMENT — ACTIVITIES OF DAILY LIVING (ADL): OASIS_M1830: 05

## 2023-11-07 PROCEDURE — 1090000002 HH PPS REVENUE DEBIT

## 2023-11-07 PROCEDURE — 1090000001 HH PPS REVENUE CREDIT

## 2023-11-08 PROCEDURE — 1090000002 HH PPS REVENUE DEBIT

## 2023-11-08 PROCEDURE — 1090000001 HH PPS REVENUE CREDIT

## 2023-11-09 PROCEDURE — 1090000001 HH PPS REVENUE CREDIT

## 2023-11-09 PROCEDURE — 1090000002 HH PPS REVENUE DEBIT

## 2023-11-10 PROCEDURE — 1090000001 HH PPS REVENUE CREDIT

## 2023-11-10 PROCEDURE — 1090000002 HH PPS REVENUE DEBIT

## 2023-11-11 PROCEDURE — 1090000002 HH PPS REVENUE DEBIT

## 2023-11-11 PROCEDURE — 1090000001 HH PPS REVENUE CREDIT

## 2023-11-12 PROCEDURE — 1090000001 HH PPS REVENUE CREDIT

## 2023-11-12 PROCEDURE — 1090000002 HH PPS REVENUE DEBIT

## 2023-11-13 PROCEDURE — 1090000001 HH PPS REVENUE CREDIT

## 2023-11-13 PROCEDURE — 1090000002 HH PPS REVENUE DEBIT

## 2023-11-14 PROCEDURE — 1090000002 HH PPS REVENUE DEBIT

## 2023-11-14 PROCEDURE — 1090000001 HH PPS REVENUE CREDIT

## 2023-11-15 PROCEDURE — 1090000002 HH PPS REVENUE DEBIT

## 2023-11-15 PROCEDURE — 1090000001 HH PPS REVENUE CREDIT

## 2023-11-16 PROCEDURE — 1090000001 HH PPS REVENUE CREDIT

## 2023-11-16 PROCEDURE — 1090000002 HH PPS REVENUE DEBIT

## 2023-11-17 PROCEDURE — 1090000001 HH PPS REVENUE CREDIT

## 2023-11-17 PROCEDURE — 1090000002 HH PPS REVENUE DEBIT

## 2023-11-18 PROCEDURE — 1090000001 HH PPS REVENUE CREDIT

## 2023-11-18 PROCEDURE — 1090000002 HH PPS REVENUE DEBIT

## 2023-11-19 PROCEDURE — 1090000001 HH PPS REVENUE CREDIT

## 2023-11-19 PROCEDURE — 1090000002 HH PPS REVENUE DEBIT

## 2023-11-20 PROCEDURE — 1090000002 HH PPS REVENUE DEBIT

## 2023-11-20 PROCEDURE — 1090000001 HH PPS REVENUE CREDIT

## 2023-11-20 PROCEDURE — 0023 HH SOC

## 2023-11-21 PROCEDURE — 1090000002 HH PPS REVENUE DEBIT

## 2023-11-21 PROCEDURE — 1090000001 HH PPS REVENUE CREDIT

## 2023-11-22 PROCEDURE — 1090000002 HH PPS REVENUE DEBIT

## 2023-11-22 PROCEDURE — 1090000001 HH PPS REVENUE CREDIT

## 2023-11-23 PROCEDURE — 1090000001 HH PPS REVENUE CREDIT

## 2023-11-23 PROCEDURE — 1090000002 HH PPS REVENUE DEBIT

## 2023-11-24 PROCEDURE — 1090000001 HH PPS REVENUE CREDIT

## 2023-11-24 PROCEDURE — 1090000002 HH PPS REVENUE DEBIT

## 2023-11-25 PROCEDURE — 1090000001 HH PPS REVENUE CREDIT

## 2023-11-25 PROCEDURE — 1090000002 HH PPS REVENUE DEBIT

## 2023-11-26 PROCEDURE — 1090000001 HH PPS REVENUE CREDIT

## 2023-11-26 PROCEDURE — 1090000002 HH PPS REVENUE DEBIT

## 2023-11-27 PROCEDURE — 1090000001 HH PPS REVENUE CREDIT

## 2023-11-27 PROCEDURE — 1090000002 HH PPS REVENUE DEBIT

## 2023-11-28 PROCEDURE — 1090000001 HH PPS REVENUE CREDIT

## 2023-11-28 PROCEDURE — 1090000002 HH PPS REVENUE DEBIT

## 2023-11-29 PROCEDURE — 1090000002 HH PPS REVENUE DEBIT

## 2023-11-29 PROCEDURE — 1090000001 HH PPS REVENUE CREDIT

## 2023-11-30 PROCEDURE — 1090000002 HH PPS REVENUE DEBIT

## 2023-11-30 PROCEDURE — 1090000001 HH PPS REVENUE CREDIT

## 2023-12-01 PROCEDURE — 1090000001 HH PPS REVENUE CREDIT

## 2023-12-01 PROCEDURE — 1090000002 HH PPS REVENUE DEBIT

## 2023-12-02 PROCEDURE — 1090000001 HH PPS REVENUE CREDIT

## 2023-12-02 PROCEDURE — 1090000002 HH PPS REVENUE DEBIT

## 2023-12-03 PROCEDURE — 1090000001 HH PPS REVENUE CREDIT

## 2023-12-03 PROCEDURE — 1090000002 HH PPS REVENUE DEBIT

## 2023-12-04 PROCEDURE — 1090000001 HH PPS REVENUE CREDIT

## 2023-12-04 PROCEDURE — 1090000002 HH PPS REVENUE DEBIT

## 2023-12-05 PROCEDURE — 1090000001 HH PPS REVENUE CREDIT

## 2023-12-05 PROCEDURE — 1090000002 HH PPS REVENUE DEBIT

## 2023-12-06 PROCEDURE — 1090000002 HH PPS REVENUE DEBIT

## 2023-12-06 PROCEDURE — 1090000001 HH PPS REVENUE CREDIT

## 2023-12-07 PROCEDURE — 1090000001 HH PPS REVENUE CREDIT

## 2023-12-07 PROCEDURE — 1090000002 HH PPS REVENUE DEBIT

## 2023-12-08 PROCEDURE — 1090000002 HH PPS REVENUE DEBIT

## 2023-12-08 PROCEDURE — 1090000001 HH PPS REVENUE CREDIT

## 2023-12-09 PROCEDURE — 1090000001 HH PPS REVENUE CREDIT

## 2023-12-09 PROCEDURE — 1090000002 HH PPS REVENUE DEBIT

## 2023-12-10 PROCEDURE — 1090000002 HH PPS REVENUE DEBIT

## 2023-12-10 PROCEDURE — 1090000001 HH PPS REVENUE CREDIT

## 2023-12-11 PROCEDURE — 1090000002 HH PPS REVENUE DEBIT

## 2023-12-11 PROCEDURE — 1090000001 HH PPS REVENUE CREDIT

## 2023-12-12 PROCEDURE — 1090000001 HH PPS REVENUE CREDIT

## 2023-12-12 PROCEDURE — 1090000002 HH PPS REVENUE DEBIT

## 2023-12-13 LAB
HOLD SPECIMEN: NORMAL

## 2023-12-13 PROCEDURE — 1090000002 HH PPS REVENUE DEBIT

## 2023-12-13 PROCEDURE — 1090000001 HH PPS REVENUE CREDIT

## 2023-12-14 PROCEDURE — 1090000002 HH PPS REVENUE DEBIT

## 2023-12-14 PROCEDURE — 1090000001 HH PPS REVENUE CREDIT

## 2023-12-15 PROCEDURE — 1090000002 HH PPS REVENUE DEBIT

## 2023-12-15 PROCEDURE — 1090000001 HH PPS REVENUE CREDIT

## 2023-12-16 PROCEDURE — 1090000001 HH PPS REVENUE CREDIT

## 2023-12-16 PROCEDURE — 1090000002 HH PPS REVENUE DEBIT

## 2023-12-17 PROCEDURE — 1090000002 HH PPS REVENUE DEBIT

## 2023-12-17 PROCEDURE — 1090000001 HH PPS REVENUE CREDIT

## 2023-12-18 PROCEDURE — 1090000002 HH PPS REVENUE DEBIT

## 2023-12-18 PROCEDURE — 1090000001 HH PPS REVENUE CREDIT

## 2023-12-19 PROCEDURE — 1090000002 HH PPS REVENUE DEBIT

## 2023-12-19 PROCEDURE — 1090000001 HH PPS REVENUE CREDIT

## 2024-05-02 ENCOUNTER — APPOINTMENT (OUTPATIENT)
Dept: RADIOLOGY | Facility: HOSPITAL | Age: 84
DRG: 699 | End: 2024-05-02
Payer: MEDICARE

## 2024-05-02 ENCOUNTER — HOSPITAL ENCOUNTER (INPATIENT)
Facility: HOSPITAL | Age: 84
LOS: 6 days | Discharge: NURSING, REHAB OR LONG-TERM CARE | DRG: 699 | End: 2024-05-09
Attending: STUDENT IN AN ORGANIZED HEALTH CARE EDUCATION/TRAINING PROGRAM | Admitting: INTERNAL MEDICINE
Payer: MEDICARE

## 2024-05-02 ENCOUNTER — APPOINTMENT (OUTPATIENT)
Dept: CARDIOLOGY | Facility: HOSPITAL | Age: 84
DRG: 699 | End: 2024-05-02
Payer: MEDICARE

## 2024-05-02 DIAGNOSIS — N39.0 URINARY TRACT INFECTION WITHOUT HEMATURIA, SITE UNSPECIFIED: Primary | ICD-10-CM

## 2024-05-02 DIAGNOSIS — K59.01 SLOW TRANSIT CONSTIPATION: ICD-10-CM

## 2024-05-02 DIAGNOSIS — I95.9 HYPOTENSION, UNSPECIFIED HYPOTENSION TYPE: ICD-10-CM

## 2024-05-02 DIAGNOSIS — K59.00 CONSTIPATION, UNSPECIFIED CONSTIPATION TYPE: ICD-10-CM

## 2024-05-02 PROBLEM — F33.3 MAJOR DEPRESSIVE DISORDER, RECURRENT EPISODE, SEVERE, WITH PSYCHOSIS (MULTI): Status: ACTIVE | Noted: 2024-05-02

## 2024-05-02 LAB
ALBUMIN SERPL BCP-MCNC: 2.8 G/DL (ref 3.4–5)
ALP SERPL-CCNC: 42 U/L (ref 33–136)
ALT SERPL W P-5'-P-CCNC: 13 U/L (ref 7–45)
ANION GAP BLDV CALCULATED.4IONS-SCNC: 4 MMOL/L (ref 10–25)
ANION GAP SERPL CALC-SCNC: 8 MMOL/L (ref 10–20)
APPEARANCE UR: ABNORMAL
AST SERPL W P-5'-P-CCNC: 20 U/L (ref 9–39)
BACTERIA #/AREA URNS AUTO: ABNORMAL /HPF
BASE EXCESS BLDV CALC-SCNC: 5.3 MMOL/L (ref -2–3)
BASOPHILS # BLD AUTO: 0.03 X10*3/UL (ref 0–0.1)
BASOPHILS NFR BLD AUTO: 0.3 %
BILIRUB SERPL-MCNC: 0.3 MG/DL (ref 0–1.2)
BILIRUB UR STRIP.AUTO-MCNC: NEGATIVE MG/DL
BNP SERPL-MCNC: 64 PG/ML (ref 0–99)
BODY TEMPERATURE: 37 DEGREES CELSIUS
BUN SERPL-MCNC: 23 MG/DL (ref 6–23)
CA-I BLDV-SCNC: 1.28 MMOL/L (ref 1.1–1.33)
CALCIUM SERPL-MCNC: 8.5 MG/DL (ref 8.6–10.3)
CAOX CRY #/AREA UR COMP ASSIST: ABNORMAL /HPF
CARDIAC TROPONIN I PNL SERPL HS: 13 NG/L (ref 0–13)
CARDIAC TROPONIN I PNL SERPL HS: 9 NG/L (ref 0–13)
CHLORIDE BLDV-SCNC: 104 MMOL/L (ref 98–107)
CHLORIDE SERPL-SCNC: 100 MMOL/L (ref 98–107)
CO2 SERPL-SCNC: 27 MMOL/L (ref 21–32)
COLOR UR: YELLOW
CREAT SERPL-MCNC: 0.65 MG/DL (ref 0.5–1.05)
EGFRCR SERPLBLD CKD-EPI 2021: 86 ML/MIN/1.73M*2
EOSINOPHIL # BLD AUTO: 0.06 X10*3/UL (ref 0–0.4)
EOSINOPHIL NFR BLD AUTO: 0.7 %
ERYTHROCYTE [DISTWIDTH] IN BLOOD BY AUTOMATED COUNT: 14.6 % (ref 11.5–14.5)
FLUAV RNA RESP QL NAA+PROBE: NOT DETECTED
FLUBV RNA RESP QL NAA+PROBE: NOT DETECTED
GLUCOSE BLDV-MCNC: 92 MG/DL (ref 74–99)
GLUCOSE SERPL-MCNC: 89 MG/DL (ref 74–99)
GLUCOSE UR STRIP.AUTO-MCNC: NEGATIVE MG/DL
HCO3 BLDV-SCNC: 31.4 MMOL/L (ref 22–26)
HCT VFR BLD AUTO: 33.3 % (ref 36–46)
HCT VFR BLD EST: 30 % (ref 36–46)
HGB BLD-MCNC: 10.8 G/DL (ref 12–16)
HGB BLDV-MCNC: 10 G/DL (ref 12–16)
HOLD SPECIMEN: 293
IMM GRANULOCYTES # BLD AUTO: 0.04 X10*3/UL (ref 0–0.5)
IMM GRANULOCYTES NFR BLD AUTO: 0.4 % (ref 0–0.9)
INHALED O2 CONCENTRATION: 21 %
KETONES UR STRIP.AUTO-MCNC: NEGATIVE MG/DL
LACTATE BLDV-SCNC: 1.6 MMOL/L (ref 0.4–2)
LACTATE SERPL-SCNC: 1.8 MMOL/L (ref 0.4–2)
LEUKOCYTE ESTERASE UR QL STRIP.AUTO: ABNORMAL
LIPASE SERPL-CCNC: 39 U/L (ref 9–82)
LYMPHOCYTES # BLD AUTO: 4.3 X10*3/UL (ref 0.8–3)
LYMPHOCYTES NFR BLD AUTO: 47.3 %
MAGNESIUM SERPL-MCNC: 1.76 MG/DL (ref 1.6–2.4)
MCH RBC QN AUTO: 32.9 PG (ref 26–34)
MCHC RBC AUTO-ENTMCNC: 32.4 G/DL (ref 32–36)
MCV RBC AUTO: 102 FL (ref 80–100)
MONOCYTES # BLD AUTO: 1.02 X10*3/UL (ref 0.05–0.8)
MONOCYTES NFR BLD AUTO: 11.2 %
MUCOUS THREADS #/AREA URNS AUTO: ABNORMAL /LPF
NEUTROPHILS # BLD AUTO: 3.65 X10*3/UL (ref 1.6–5.5)
NEUTROPHILS NFR BLD AUTO: 40.1 %
NITRITE UR QL STRIP.AUTO: NEGATIVE
NRBC BLD-RTO: 0 /100 WBCS (ref 0–0)
OXYHGB MFR BLDV: 27.2 % (ref 45–75)
PCO2 BLDV: 53 MM HG (ref 41–51)
PH BLDV: 7.38 PH (ref 7.33–7.43)
PH UR STRIP.AUTO: 5 [PH]
PLATELET # BLD AUTO: 188 X10*3/UL (ref 150–450)
PO2 BLDV: 21 MM HG (ref 35–45)
POTASSIUM BLDV-SCNC: 4.4 MMOL/L (ref 3.5–5.3)
POTASSIUM SERPL-SCNC: 4.2 MMOL/L (ref 3.5–5.3)
PROT SERPL-MCNC: 9.4 G/DL (ref 6.4–8.2)
PROT UR STRIP.AUTO-MCNC: ABNORMAL MG/DL
RBC # BLD AUTO: 3.28 X10*6/UL (ref 4–5.2)
RBC # UR STRIP.AUTO: ABNORMAL /UL
RBC #/AREA URNS AUTO: >20 /HPF
SAO2 % BLDV: 27 % (ref 45–75)
SARS-COV-2 RNA RESP QL NAA+PROBE: NOT DETECTED
SODIUM BLDV-SCNC: 135 MMOL/L (ref 136–145)
SODIUM SERPL-SCNC: 131 MMOL/L (ref 136–145)
SP GR UR STRIP.AUTO: 1.02
SQUAMOUS #/AREA URNS AUTO: ABNORMAL /HPF
TRANS CELLS #/AREA UR COMP ASSIST: ABNORMAL /HPF
UROBILINOGEN UR STRIP.AUTO-MCNC: 2 MG/DL
WBC # BLD AUTO: 9.1 X10*3/UL (ref 4.4–11.3)
WBC #/AREA URNS AUTO: >50 /HPF
WBC CLUMPS #/AREA URNS AUTO: ABNORMAL /HPF
YEAST HYPHAE #/AREA UR COMP ASSIST: PRESENT /HPF

## 2024-05-02 PROCEDURE — 85025 COMPLETE CBC W/AUTO DIFF WBC: CPT | Performed by: STUDENT IN AN ORGANIZED HEALTH CARE EDUCATION/TRAINING PROGRAM

## 2024-05-02 PROCEDURE — 83880 ASSAY OF NATRIURETIC PEPTIDE: CPT | Performed by: STUDENT IN AN ORGANIZED HEALTH CARE EDUCATION/TRAINING PROGRAM

## 2024-05-02 PROCEDURE — 84132 ASSAY OF SERUM POTASSIUM: CPT | Mod: 91 | Performed by: STUDENT IN AN ORGANIZED HEALTH CARE EDUCATION/TRAINING PROGRAM

## 2024-05-02 PROCEDURE — 2500000004 HC RX 250 GENERAL PHARMACY W/ HCPCS (ALT 636 FOR OP/ED): Performed by: STUDENT IN AN ORGANIZED HEALTH CARE EDUCATION/TRAINING PROGRAM

## 2024-05-02 PROCEDURE — 96365 THER/PROPH/DIAG IV INF INIT: CPT | Mod: 59

## 2024-05-02 PROCEDURE — 2500000006 HC RX 250 W HCPCS SELF ADMINISTERED DRUGS (ALT 637 FOR ALL PAYERS): Performed by: NURSE PRACTITIONER

## 2024-05-02 PROCEDURE — G0378 HOSPITAL OBSERVATION PER HR: HCPCS

## 2024-05-02 PROCEDURE — 96372 THER/PROPH/DIAG INJ SC/IM: CPT | Performed by: NURSE PRACTITIONER

## 2024-05-02 PROCEDURE — 96361 HYDRATE IV INFUSION ADD-ON: CPT

## 2024-05-02 PROCEDURE — 87086 URINE CULTURE/COLONY COUNT: CPT | Mod: PORLAB | Performed by: STUDENT IN AN ORGANIZED HEALTH CARE EDUCATION/TRAINING PROGRAM

## 2024-05-02 PROCEDURE — 99222 1ST HOSP IP/OBS MODERATE 55: CPT | Performed by: NURSE PRACTITIONER

## 2024-05-02 PROCEDURE — 84484 ASSAY OF TROPONIN QUANT: CPT | Performed by: STUDENT IN AN ORGANIZED HEALTH CARE EDUCATION/TRAINING PROGRAM

## 2024-05-02 PROCEDURE — 2550000001 HC RX 255 CONTRASTS: Performed by: STUDENT IN AN ORGANIZED HEALTH CARE EDUCATION/TRAINING PROGRAM

## 2024-05-02 PROCEDURE — 74177 CT ABD & PELVIS W/CONTRAST: CPT

## 2024-05-02 PROCEDURE — 70450 CT HEAD/BRAIN W/O DYE: CPT

## 2024-05-02 PROCEDURE — 36415 COLL VENOUS BLD VENIPUNCTURE: CPT | Performed by: STUDENT IN AN ORGANIZED HEALTH CARE EDUCATION/TRAINING PROGRAM

## 2024-05-02 PROCEDURE — 2500000004 HC RX 250 GENERAL PHARMACY W/ HCPCS (ALT 636 FOR OP/ED): Performed by: NURSE PRACTITIONER

## 2024-05-02 PROCEDURE — 83690 ASSAY OF LIPASE: CPT | Performed by: STUDENT IN AN ORGANIZED HEALTH CARE EDUCATION/TRAINING PROGRAM

## 2024-05-02 PROCEDURE — 70450 CT HEAD/BRAIN W/O DYE: CPT | Performed by: STUDENT IN AN ORGANIZED HEALTH CARE EDUCATION/TRAINING PROGRAM

## 2024-05-02 PROCEDURE — 2500000001 HC RX 250 WO HCPCS SELF ADMINISTERED DRUGS (ALT 637 FOR MEDICARE OP): Performed by: NURSE PRACTITIONER

## 2024-05-02 PROCEDURE — 71046 X-RAY EXAM CHEST 2 VIEWS: CPT | Mod: FOREIGN READ | Performed by: RADIOLOGY

## 2024-05-02 PROCEDURE — 93005 ELECTROCARDIOGRAM TRACING: CPT

## 2024-05-02 PROCEDURE — 71046 X-RAY EXAM CHEST 2 VIEWS: CPT

## 2024-05-02 PROCEDURE — 87040 BLOOD CULTURE FOR BACTERIA: CPT | Mod: PORLAB | Performed by: EMERGENCY MEDICINE

## 2024-05-02 PROCEDURE — 74177 CT ABD & PELVIS W/CONTRAST: CPT | Mod: FOREIGN READ | Performed by: RADIOLOGY

## 2024-05-02 PROCEDURE — 81001 URINALYSIS AUTO W/SCOPE: CPT | Performed by: STUDENT IN AN ORGANIZED HEALTH CARE EDUCATION/TRAINING PROGRAM

## 2024-05-02 PROCEDURE — 99285 EMERGENCY DEPT VISIT HI MDM: CPT | Mod: 25

## 2024-05-02 PROCEDURE — 83735 ASSAY OF MAGNESIUM: CPT | Performed by: STUDENT IN AN ORGANIZED HEALTH CARE EDUCATION/TRAINING PROGRAM

## 2024-05-02 PROCEDURE — 2500000004 HC RX 250 GENERAL PHARMACY W/ HCPCS (ALT 636 FOR OP/ED): Performed by: EMERGENCY MEDICINE

## 2024-05-02 PROCEDURE — 87636 SARSCOV2 & INF A&B AMP PRB: CPT | Performed by: STUDENT IN AN ORGANIZED HEALTH CARE EDUCATION/TRAINING PROGRAM

## 2024-05-02 PROCEDURE — 83605 ASSAY OF LACTIC ACID: CPT | Performed by: STUDENT IN AN ORGANIZED HEALTH CARE EDUCATION/TRAINING PROGRAM

## 2024-05-02 PROCEDURE — 84075 ASSAY ALKALINE PHOSPHATASE: CPT | Performed by: STUDENT IN AN ORGANIZED HEALTH CARE EDUCATION/TRAINING PROGRAM

## 2024-05-02 RX ORDER — ONDANSETRON 4 MG/1
4 TABLET, FILM COATED ORAL EVERY 8 HOURS PRN
COMMUNITY

## 2024-05-02 RX ORDER — CHOLECALCIFEROL (VITAMIN D3) 125 MCG
3000 CAPSULE ORAL
COMMUNITY

## 2024-05-02 RX ORDER — BRIMONIDINE TARTRATE 2 MG/ML
1 SOLUTION/ DROPS OPHTHALMIC 2 TIMES DAILY
COMMUNITY

## 2024-05-02 RX ORDER — BETHANECHOL CHLORIDE 5 MG/1
5 TABLET ORAL 3 TIMES DAILY
COMMUNITY

## 2024-05-02 RX ORDER — QUETIAPINE FUMARATE 25 MG/1
25 TABLET, FILM COATED ORAL NIGHTLY
Status: DISCONTINUED | OUTPATIENT
Start: 2024-05-02 | End: 2024-05-03

## 2024-05-02 RX ORDER — ARIPIPRAZOLE 5 MG/1
2.5 TABLET ORAL DAILY
COMMUNITY

## 2024-05-02 RX ORDER — POLYETHYLENE GLYCOL 3350 17 G/17G
17 POWDER, FOR SOLUTION ORAL DAILY
Status: DISCONTINUED | OUTPATIENT
Start: 2024-05-02 | End: 2024-05-09 | Stop reason: HOSPADM

## 2024-05-02 RX ORDER — CEFTRIAXONE 2 G/50ML
2 INJECTION, SOLUTION INTRAVENOUS ONCE
Status: COMPLETED | OUTPATIENT
Start: 2024-05-02 | End: 2024-05-02

## 2024-05-02 RX ORDER — POTASSIUM CHLORIDE 20 MEQ/1
20 TABLET, EXTENDED RELEASE ORAL DAILY
COMMUNITY

## 2024-05-02 RX ORDER — DIVALPROEX SODIUM 125 MG/1
125 CAPSULE, COATED PELLETS ORAL 2 TIMES DAILY
COMMUNITY

## 2024-05-02 RX ORDER — SODIUM CHLORIDE, SODIUM LACTATE, POTASSIUM CHLORIDE, CALCIUM CHLORIDE 600; 310; 30; 20 MG/100ML; MG/100ML; MG/100ML; MG/100ML
100 INJECTION, SOLUTION INTRAVENOUS CONTINUOUS
Status: DISCONTINUED | OUTPATIENT
Start: 2024-05-02 | End: 2024-05-09 | Stop reason: HOSPADM

## 2024-05-02 RX ORDER — LOPERAMIDE HYDROCHLORIDE 2 MG/1
4 CAPSULE ORAL 4 TIMES DAILY PRN
COMMUNITY

## 2024-05-02 RX ORDER — NAPROXEN SODIUM 220 MG/1
81 TABLET, FILM COATED ORAL DAILY
Status: DISCONTINUED | OUTPATIENT
Start: 2024-05-02 | End: 2024-05-09 | Stop reason: HOSPADM

## 2024-05-02 RX ORDER — TALC
6 POWDER (GRAM) TOPICAL NIGHTLY
Status: DISCONTINUED | OUTPATIENT
Start: 2024-05-02 | End: 2024-05-03

## 2024-05-02 RX ORDER — FUROSEMIDE 20 MG/1
20 TABLET ORAL EVERY MORNING
COMMUNITY

## 2024-05-02 RX ORDER — ACETAMINOPHEN 500 MG
1000 TABLET ORAL 3 TIMES DAILY
COMMUNITY

## 2024-05-02 RX ORDER — BISACODYL 5 MG
10 TABLET, DELAYED RELEASE (ENTERIC COATED) ORAL ONCE
Status: COMPLETED | OUTPATIENT
Start: 2024-05-02 | End: 2024-05-02

## 2024-05-02 RX ORDER — DOCUSATE SODIUM 100 MG/1
100 CAPSULE, LIQUID FILLED ORAL DAILY
COMMUNITY

## 2024-05-02 RX ORDER — ENOXAPARIN SODIUM 100 MG/ML
40 INJECTION SUBCUTANEOUS EVERY 24 HOURS
Status: DISCONTINUED | OUTPATIENT
Start: 2024-05-02 | End: 2024-05-09 | Stop reason: HOSPADM

## 2024-05-02 RX ORDER — TAMSULOSIN HYDROCHLORIDE 0.4 MG/1
0.4 CAPSULE ORAL DAILY
COMMUNITY

## 2024-05-02 RX ORDER — LEVOTHYROXINE SODIUM 25 UG/1
25 TABLET ORAL DAILY
Status: DISCONTINUED | OUTPATIENT
Start: 2024-05-03 | End: 2024-05-03

## 2024-05-02 RX ORDER — CEFTRIAXONE 1 G/50ML
1 INJECTION, SOLUTION INTRAVENOUS EVERY 24 HOURS
Status: DISCONTINUED | OUTPATIENT
Start: 2024-05-03 | End: 2024-05-09 | Stop reason: HOSPADM

## 2024-05-02 RX ADMIN — QUETIAPINE FUMARATE 25 MG: 25 TABLET ORAL at 21:00

## 2024-05-02 RX ADMIN — SODIUM CHLORIDE, POTASSIUM CHLORIDE, SODIUM LACTATE AND CALCIUM CHLORIDE 100 ML/HR: 600; 310; 30; 20 INJECTION, SOLUTION INTRAVENOUS at 20:22

## 2024-05-02 RX ADMIN — Medication 6 MG: at 21:00

## 2024-05-02 RX ADMIN — SODIUM CHLORIDE, POTASSIUM CHLORIDE, SODIUM LACTATE AND CALCIUM CHLORIDE 1000 ML: 600; 310; 30; 20 INJECTION, SOLUTION INTRAVENOUS at 13:48

## 2024-05-02 RX ADMIN — BISACODYL 10 MG: 5 TABLET, COATED ORAL at 19:45

## 2024-05-02 RX ADMIN — IOHEXOL 75 ML: 350 INJECTION, SOLUTION INTRAVENOUS at 15:42

## 2024-05-02 RX ADMIN — ENOXAPARIN SODIUM 40 MG: 40 INJECTION SUBCUTANEOUS at 22:15

## 2024-05-02 RX ADMIN — CEFTRIAXONE SODIUM 2 G: 2 INJECTION, SOLUTION INTRAVENOUS at 17:35

## 2024-05-02 RX ADMIN — ASPIRIN 81 MG CHEWABLE TABLET 81 MG: 81 TABLET CHEWABLE at 19:45

## 2024-05-02 SDOH — SOCIAL STABILITY: SOCIAL INSECURITY: WERE YOU ABLE TO COMPLETE ALL THE BEHAVIORAL HEALTH SCREENINGS?: YES

## 2024-05-02 SDOH — SOCIAL STABILITY: SOCIAL INSECURITY: HAVE YOU HAD THOUGHTS OF HARMING ANYONE ELSE?: NO

## 2024-05-02 ASSESSMENT — COGNITIVE AND FUNCTIONAL STATUS - GENERAL
DAILY ACTIVITIY SCORE: 18
MOBILITY SCORE: 18
TURNING FROM BACK TO SIDE WHILE IN FLAT BAD: TOTAL
PERSONAL GROOMING: TOTAL
DAILY ACTIVITIY SCORE: 6
MOBILITY SCORE: 6
CLIMB 3 TO 5 STEPS WITH RAILING: TOTAL
MOVING TO AND FROM BED TO CHAIR: A LITTLE
MOVING TO AND FROM BED TO CHAIR: TOTAL
CLIMB 3 TO 5 STEPS WITH RAILING: A LITTLE
WALKING IN HOSPITAL ROOM: A LITTLE
TOILETING: TOTAL
STANDING UP FROM CHAIR USING ARMS: TOTAL
PERSONAL GROOMING: A LITTLE
DRESSING REGULAR UPPER BODY CLOTHING: A LITTLE
HELP NEEDED FOR BATHING: A LITTLE
DRESSING REGULAR UPPER BODY CLOTHING: TOTAL
TOILETING: A LITTLE
DRESSING REGULAR LOWER BODY CLOTHING: TOTAL
WALKING IN HOSPITAL ROOM: TOTAL
MOVING FROM LYING ON BACK TO SITTING ON SIDE OF FLAT BED WITH BEDRAILS: TOTAL
PATIENT BASELINE BEDBOUND: NO
TURNING FROM BACK TO SIDE WHILE IN FLAT BAD: A LITTLE
STANDING UP FROM CHAIR USING ARMS: A LITTLE
EATING MEALS: A LITTLE
DRESSING REGULAR LOWER BODY CLOTHING: A LITTLE
MOVING FROM LYING ON BACK TO SITTING ON SIDE OF FLAT BED WITH BEDRAILS: A LITTLE
HELP NEEDED FOR BATHING: TOTAL
EATING MEALS: TOTAL

## 2024-05-02 ASSESSMENT — ACTIVITIES OF DAILY LIVING (ADL)
PATIENT'S MEMORY ADEQUATE TO SAFELY COMPLETE DAILY ACTIVITIES?: NO
HEARING - LEFT EAR: FUNCTIONAL
ADEQUATE_TO_COMPLETE_ADL: YES
ASSISTIVE_DEVICE: DENTURES UPPER;DENTURES LOWER;EYEGLASSES
DRESSING YOURSELF: NEEDS ASSISTANCE
FEEDING YOURSELF: NEEDS ASSISTANCE
HEARING - RIGHT EAR: FUNCTIONAL
WALKS IN HOME: NEEDS ASSISTANCE
PATIENT'S MEMORY ADEQUATE TO SAFELY COMPLETE DAILY ACTIVITIES?: YES
BATHING: DEPENDENT
TOILETING: NEEDS ASSISTANCE
JUDGMENT_ADEQUATE_SAFELY_COMPLETE_DAILY_ACTIVITIES: YES
HEARING - RIGHT EAR: DIFFICULTY WITH NOISE
WALKS IN HOME: DEPENDENT
TOILETING: DEPENDENT
ADEQUATE_TO_COMPLETE_ADL: YES
DRESSING YOURSELF: DEPENDENT
JUDGMENT_ADEQUATE_SAFELY_COMPLETE_DAILY_ACTIVITIES: NO
GROOMING: DEPENDENT
HEARING - LEFT EAR: DIFFICULTY WITH NOISE
GROOMING: NEEDS ASSISTANCE
FEEDING YOURSELF: DEPENDENT
LACK_OF_TRANSPORTATION: NO
BATHING: NEEDS ASSISTANCE

## 2024-05-02 ASSESSMENT — LIFESTYLE VARIABLES
SKIP TO QUESTIONS 9-10: 1
PRESCIPTION_ABUSE_PAST_12_MONTHS: NO
HAVE YOU EVER FELT YOU SHOULD CUT DOWN ON YOUR DRINKING: NO
SUBSTANCE_ABUSE_PAST_12_MONTHS: NO
EVER HAD A DRINK FIRST THING IN THE MORNING TO STEADY YOUR NERVES TO GET RID OF A HANGOVER: NO
AUDIT-C TOTAL SCORE: 0
AUDIT-C TOTAL SCORE: 0
TOTAL SCORE: 0
HOW OFTEN DO YOU HAVE A DRINK CONTAINING ALCOHOL: NEVER
HAVE PEOPLE ANNOYED YOU BY CRITICIZING YOUR DRINKING: NO
HOW MANY STANDARD DRINKS CONTAINING ALCOHOL DO YOU HAVE ON A TYPICAL DAY: PATIENT DOES NOT DRINK
HOW OFTEN DO YOU HAVE 6 OR MORE DRINKS ON ONE OCCASION: NEVER
EVER FELT BAD OR GUILTY ABOUT YOUR DRINKING: NO

## 2024-05-02 ASSESSMENT — ENCOUNTER SYMPTOMS
LIGHT-HEADEDNESS: 1
VOMITING: 0
FEVER: 0
FATIGUE: 1
APPETITE CHANGE: 1
SHORTNESS OF BREATH: 0
CHILLS: 0
NAUSEA: 0
ABDOMINAL PAIN: 0

## 2024-05-02 ASSESSMENT — PAIN SCALES - GENERAL: PAINLEVEL_OUTOF10: 0 - NO PAIN

## 2024-05-02 ASSESSMENT — PATIENT HEALTH QUESTIONNAIRE - PHQ9
SUM OF ALL RESPONSES TO PHQ9 QUESTIONS 1 & 2: 0
2. FEELING DOWN, DEPRESSED OR HOPELESS: NOT AT ALL
1. LITTLE INTEREST OR PLEASURE IN DOING THINGS: NOT AT ALL

## 2024-05-02 ASSESSMENT — COLUMBIA-SUICIDE SEVERITY RATING SCALE - C-SSRS
1. IN THE PAST MONTH, HAVE YOU WISHED YOU WERE DEAD OR WISHED YOU COULD GO TO SLEEP AND NOT WAKE UP?: NO
2. HAVE YOU ACTUALLY HAD ANY THOUGHTS OF KILLING YOURSELF?: NO
6. HAVE YOU EVER DONE ANYTHING, STARTED TO DO ANYTHING, OR PREPARED TO DO ANYTHING TO END YOUR LIFE?: NO

## 2024-05-02 ASSESSMENT — PAIN - FUNCTIONAL ASSESSMENT: PAIN_FUNCTIONAL_ASSESSMENT: 0-10

## 2024-05-02 ASSESSMENT — PAIN DESCRIPTION - PROGRESSION: CLINICAL_PROGRESSION: NOT CHANGED

## 2024-05-02 NOTE — PROGRESS NOTES
This progress note represents a emergency department transition note for signout of care.    Patient care was signed out to me. Please see the previous provider's notes for the full history and physical. Briefly, Qi Davila is 83 y.o. female who had presented to the emergency department with hypotension.  Patient currently had a Samson catheter placed approximately 2 weeks ago and came in with low blood pressures.  She has been fluid responsive after 2 L and Samson catheter has been exchanged.  Patient is currently pending CT imaging and likely admission per signout.    Patient results showed that She had what appeared to be a fecal impaction on her CT scan.  Patient otherwise had no other acute findings.  CT head scan was negative.  I did note that she had a urinary tract infection with a Samson catheter that been exchanged.  This could explain patient's hypotension.  I ordered antibiotics and sepsis time noted in the ED course.  The patient did not require 30 cc/kg fluid bolus.  I did perform a disimpaction.  Patient's stool was soft.  Updated POA.  Patient was admitted to [facility] due to urinary tract infection hypotension..    Edgar Cordero DO  Emergency Medicine    ED Course as of 05/05/24 1444   Thu May 02, 2024   1405 EKG shows sinus rhythm at a rate of 66 bpm no ST change elevation nonischemic EKG, normal intervals. [CF]   1515 IMPRESSION:  No evidence consolidating infiltrate or effusion.   [CF]   1650 Leukocyte Esterase, Urine(!): LARGE (3+) [WJ]   1651 Urinalysis with Reflex Culture and Microscopic(!)  I reviewed the patient's labs and found that she did have leukocyte Estrace concerning for urinary tract infection.  Sepsis recognized at this time with ceftriaxone ordered.  Patient lactic acid was 1.8 did not require 30 cc/kg fluid bolus.  No endorgan ischemia.  Due to the low blood pressures, will admit. [WJ]   1705 Disimpaction, soft stool [WJ]   1705 Spoke to Paola (POA) [phone], updated on plan for admit  and results [WJ]      ED Course User Index  [CF] Vera Amaya MD  [WJ] Bertin Cordero DO         Diagnoses as of 05/05/24 1444   Urinary tract infection without hematuria, site unspecified   Hypotension, unspecified hypotension type   Constipation, unspecified constipation type      Procedures

## 2024-05-02 NOTE — PROGRESS NOTES
Qi Davila is a 83 y.o. female admitted for Complicated UTI (urinary tract infection). Pharmacy reviewed the patient's [id number] medications and allergies for accuracy.    The list below reflects the PTA list prior to pharmacy medication history. A summary a changes to the PTA medication list has been listed below. Please review each medication in order reconciliation for additional clarification and justification.    Source of information:  [facility]    Medications added:  ACETAMINOPHEN 500MG  2 TABS TID  ARIPRAZOLE 2.5MG 1 every day  BETHANECHOL 5MG T TID  BRIMONIDINE OPTH 0.2% 1 DROP BOTH EYES BID  DIVALPROEX 125MG  1 BID  DOCUSATE 100MG  1 every day  FUROSEMIDE 20MG  1 every day  LACTASE TABLET 3000MG 1 every day  LEVOTHYROXINE 25MCG 1 every day  LIDOCAINE PATCH  APPLY 1 every day TO KNEES AND BACK  LOPERAMIDE 4MG  EVERY 6 PRN  POTASSIUM CHLORIDE ER 20 1 every day  TAMSULOSIN 0.4  1 every day  TRAZADONE 25MG  1QHS  ONDANSETRON 4MG 1 Q8 PRN            Medications modified:    Medications to be removed:  FAMICLOVIR  ALPHAGON  JOINT HEALTH  VITAMIN D  CICLOPIROX  CLOBETASOL  LOTRISONE  DEXLANPRAZOLE  FLUTICISONE  LATANPROST  RESTASIS  Sertraline  quetiapine    Medications of concern:      Prior to Admission Medications   Prescriptions Last Dose Informant Patient Reported? Taking?   ARIPiprazole (Abilify) 5 mg tablet   Yes No   Sig: Take 1 tablet (5 mg) by mouth once daily.   QUEtiapine (SEROquel) 25 mg tablet   No No   Sig: Take 1 tablet (25 mg) by mouth once daily at bedtime for 7 days.   UNABLE TO FIND   Yes No   Sig: Med Name: LIDOCAINE EXTERNAL PATCH 4 %. APPLY TO BOTH KNEES BID . AND LOWER BACK   acetaminophen (Tylenol) 500 mg tablet   Yes No   Sig: Take 2 tablets (1,000 mg) by mouth 3 times a day.   aspirin 81 mg chewable tablet   Yes No   Sig: Chew 81 mg once daily. Indications: stroke prevention, treatment to prevent a heart attack, treatment to prevent peripheral artery thromboembolism   bethanechol  (Urecholine) 5 mg tablet   Yes No   Sig: Take by mouth 3 times a day.   brimonidine (AlphaGAN) 0.2 % ophthalmic solution   Yes No   Si drop 2 times a day.   divalproex sprinkle (Depakote Sprinkle) 125 mg DR capsule   Yes No   Sig: Take 1 capsule (125 mg) by mouth 2 times a day.   docusate sodium (Colace) 100 mg capsule   Yes No   Sig: Take 1 capsule (100 mg) by mouth 2 times a day.   furosemide (Lasix) 20 mg tablet   Yes No   Sig: Take 1 tablet (20 mg) by mouth.   lactase (Lactaid) 3,000 unit tablet   Yes No   Sig: Take 1 tablet (3,000 Units) by mouth 3 times a day with meals.   levothyroxine (Synthroid, Levoxyl) 25 mcg tablet   Yes No   Sig: Take 1 tablet by mouth once daily.   loperamide (Imodium) 2 mg capsule   Yes No   Sig: Take 2 capsules (4 mg) by mouth 4 times a day as needed for diarrhea.   melatonin 3 mg tablet   Yes No   Sig: Take 6 mg by mouth once daily at bedtime. Indications: difficulty sleeping   ondansetron (Zofran) 4 mg tablet   Yes No   Sig: Take 1 tablet (4 mg) by mouth every 8 hours if needed for nausea or vomiting.   polyethylene glycol (Miralax) packet   Yes No   Sig: Take 17 g by mouth once daily. Indications: constipation   potassium chloride CR 20 mEq ER tablet   Yes No   Sig: Take 1 tablet (20 mEq) by mouth once daily. Do not crush or chew.   propranolol (Inderal) 20 mg tablet   Yes No   Sig: Take 1 tablet by mouth 2 times a day.   sertraline (Zoloft) 50 mg tablet   No No   Sig: Take 2 tablets (100 mg) by mouth once daily for 7 days.   tamsulosin (Flomax) 0.4 mg 24 hr capsule   Yes No   Sig: Take 1 capsule (0.4 mg) by mouth once daily.   timolol (Timoptic) 0.5 % ophthalmic solution   Yes No   Sig: Administer 1 drop into both eyes once daily.   traZODone (Desyrel) 25 MG split tablet   Yes No   Sig: Take by mouth once daily at bedtime.      Facility-Administered Medications: None       Jayda Pat

## 2024-05-02 NOTE — ED NOTES
Pt arrives to ED via EMS from SNF    Code Status:  No Order    HPI     Chief Complaint   Patient presents with    Hypotension       /56   Pulse 74   Temp 36.5 °C (97.7 °F) (Oral)   Resp 16   Wt 81.6 kg (180 lb)   SpO2 95%     Coats Coma Scale Score: 15      LDA:   Peripheral IV 05/02/24 20 G Left Antecubital (Active)   Placement Date/Time: 05/02/24 1320   Placed by External Staff?: EMS  Size (Gauge): 20 G  Orientation: Left  Location: Antecubital   Number of days: 0       Urethral Catheter Non-latex 16 Fr. (Active)   Placement Date/Time: 05/02/24 1530   Placed by: marlen meadows  Hand Hygiene Completed: Yes  Catheter Type: Non-latex  Tube Size (Fr.): 16 Fr.  Catheter Balloon Size: 10 mL  Urine Returned: Yes   Number of days: 0        BACKGROUND  Past Medical History:   Diagnosis Date    Disease of thyroid gland     Hypertension     Personal history of other mental and behavioral disorders     History of psychiatric hospitalization    Personal history of other specified conditions 01/10/2022    History of insomnia     Past Surgical History:   Procedure Laterality Date    MR HEAD ANGIO WO IV CONTRAST  1/5/2012    MR HEAD ANGIO WO IV CONTRAST 1/5/2012 [facility] CLINICAL LEGAC[facility]     No current facility-administered medications on file prior to encounter.     Current Outpatient Medications on File Prior to Encounter   Medication Sig Dispense Refill    acetaminophen (Tylenol) 500 mg tablet Take 2 tablets (1,000 mg) by mouth 3 times a day.      ARIPiprazole (Abilify) 5 mg tablet Take 1 tablet (5 mg) by mouth once daily.      aspirin 81 mg chewable tablet Chew 81 mg once daily. Indications: stroke prevention, treatment to prevent a heart attack, treatment to prevent peripheral artery thromboembolism      bethanechol (Urecholine) 5 mg tablet Take by mouth 3 times a day.      brimonidine (AlphaGAN) 0.2 % ophthalmic solution 1 drop 2 times a day.      divalproex sprinkle (Depakote Sprinkle) 125 mg DR capsule Take 1 capsule  (125 mg) by mouth 2 times a day.      docusate sodium (Colace) 100 mg capsule Take 1 capsule (100 mg) by mouth 2 times a day.      furosemide (Lasix) 20 mg tablet Take 1 tablet (20 mg) by mouth.      lactase (Lactaid) 3,000 unit tablet Take 1 tablet (3,000 Units) by mouth 3 times a day with meals.      levothyroxine (Synthroid, Levoxyl) 25 mcg tablet Take 1 tablet by mouth once daily.      loperamide (Imodium) 2 mg capsule Take 2 capsules (4 mg) by mouth 4 times a day as needed for diarrhea.      melatonin 3 mg tablet Take 6 mg by mouth once daily at bedtime. Indications: difficulty sleeping      ondansetron (Zofran) 4 mg tablet Take 1 tablet (4 mg) by mouth every 8 hours if needed for nausea or vomiting.      polyethylene glycol (Miralax) packet Take 17 g by mouth once daily. Indications: constipation      potassium chloride CR 20 mEq ER tablet Take 1 tablet (20 mEq) by mouth once daily. Do not crush or chew.      propranolol (Inderal) 20 mg tablet Take 1 tablet by mouth 2 times a day.      QUEtiapine (SEROquel) 25 mg tablet Take 1 tablet (25 mg) by mouth once daily at bedtime for 7 days. 7 tablet 0    sertraline (Zoloft) 50 mg tablet Take 2 tablets (100 mg) by mouth once daily for 7 days. 14 tablet 0    tamsulosin (Flomax) 0.4 mg 24 hr capsule Take 1 capsule (0.4 mg) by mouth once daily.      timolol (Timoptic) 0.5 % ophthalmic solution Administer 1 drop into both eyes once daily.      traZODone (Desyrel) 25 MG split tablet Take by mouth once daily at bedtime.      UNABLE TO FIND Med Name: LIDOCAINE EXTERNAL PATCH 4 %. APPLY TO BOTH KNEES BID . AND LOWER BACK      [DISCONTINUED] Alphagan P 0.1 % ophthalmic solution Administer 1 drop into both eyes every 12 hours.      [DISCONTINUED] cartilage/collagen/bor/hyalur (JOINT HEALTH ORAL) Take 4 capsules by mouth 3 times a day. Indications: arthritis      [DISCONTINUED] cholecalciferol (Vitamin D3) 50 mcg (2,000 unit) capsule Take 2,000 Units by mouth once daily.  Indications: prevention of vitamin D deficiency      [DISCONTINUED] ciclopirox 1 % shampoo Apply 1 Application topically once daily at bedtime.      [DISCONTINUED] clobetasol (Temovate) 0.05 % external solution Apply 1 Application topically 2 times a day.      [DISCONTINUED] clotrimazole-betamethasone (Lotrisone) cream Apply 1 Application topically 2 times a day.      [DISCONTINUED] dexlansoprazole (Dexilant) 30 mg DR capsule Take 1 capsule by mouth once daily.      [DISCONTINUED] famciclovir (Famvir) 500 mg tablet Take 1 tablet (500 mg) by mouth 1 time if needed. With oubreak      [DISCONTINUED] fluticasone (Flonase) 50 mcg/actuation nasal spray Administer 1 spray into each nostril once daily.      [DISCONTINUED] latanoprost (Xalatan) 0.005 % ophthalmic solution Administer 1 drop into both eyes once daily at bedtime.      [DISCONTINUED] Restasis 0.05 % ophthalmic emulsion Administer 1 drop into both eyes every 12 hours.          ASSESSMENT  ED Course as of 05/02/24 1817   Thu May 02, 2024   1405 EKG shows sinus rhythm at a rate of 66 bpm no ST change elevation nonischemic EKG, normal intervals. [CF]   1515 IMPRESSION:  No evidence consolidating infiltrate or effusion.   [CF]   1650 Leukocyte Esterase, Urine(!): LARGE (3+) [WJ]   1651 Urinalysis with Reflex Culture and Microscopic(!)  I reviewed the patient's labs and found that she did have leukocyte Estrace concerning for urinary tract infection.  Sepsis recognized at this time with ceftriaxone ordered.  Patient lactic acid was 1.8 did not require 30 cc/kg fluid bolus.  No endorgan ischemia.  Due to the low blood pressures, will admit. [WJ]   1705 Disimpaction, soft stool [WJ]   1705 Spoke to Paola (POA) [phone], updated on plan for admit and results [WJ]      ED Course User Index  [CF] Vera Amaya MD  [W] Bertin Cordero,          Diagnoses as of 05/02/24 1817   Urinary tract infection without hematuria, site unspecified   Hypotension, unspecified  hypotension type   Constipation, unspecified constipation type       Medications Currently Running:       Medications Given:  ED Medication Administration from 05/02/2024 1316 to 05/02/2024 1817         Date/Time Order Dose Route Action Action by     05/02/2024 1348 EDT lactated Ringer's bolus 1,000 mL 1,000 mL intravenous New Bag GeorgianGWYN     05/02/2024 1542 EDT iohexol (OMNIPaque) 350 mg iodine/mL solution 75 mL 75 mL intravenous Given GWYN Alfonso     05/02/2024 1557 EDT lactated Ringer's bolus 1,000 mL 0 mL intravenous Stopped GWYN Mckeon     05/02/2024 1655 EDT sodium phosphates (Fleet (Pediatric)) enema 1 enema 1 enema rectal Not Given GWYN Mckeon     05/02/2024 1735 EDT cefTRIAXone (Rocephin) 2 g in dextrose 5% in water (D5W) 50 mL 2 g intravenous New Bag GWYN Mckeon                 RESULTS    Imaging:  CT abdomen pelvis w IV contrast   Final Result   1. Large amount of formed stool in the rectal vault measuring up to   8.3 cm in diameter with mild perirectal fat stranding. Moderate stool   throughout the remainder of the colon. Findings suggestive of fecal   impaction with mild associated stercoral proctitis.   2. Mild hepatic steatosis.   3. Moderate size hiatal hernia.   4. Findings of chronic pancreatitis.   Signed by Frankie Galindo MD      CT head wo IV contrast   Final Result   No acute intracranial hemorrhage, mass effect, or CT apparent acute   infarct. Chronic microvascular ischemic and involutional changes.        Signed by: Joesph Ramírez 5/2/2024 4:13 PM   Dictation workstation:   [id number]      XR chest 2 views   Final Result   No evidence consolidating infiltrate or effusion.   Signed by Christopher Villalta MD         }  Labs ::99  Abnormal Labs Reviewed   CBC WITH AUTO DIFFERENTIAL - Abnormal; Notable for the following components:       Result Value    RBC 3.28 (*)     Hemoglobin 10.8 (*)     Hematocrit 33.3 (*)      (*)     RDW 14.6 (*)     Lymphocytes Absolute 4.30 (*)     Monocytes Absolute  1.02 (*)     All other components within normal limits   COMPREHENSIVE METABOLIC PANEL - Abnormal; Notable for the following components:    Sodium 131 (*)     Anion Gap 8 (*)     Calcium 8.5 (*)     Albumin 2.8 (*)     Total Protein 9.4 (*)     All other components within normal limits   BLOOD GAS VENOUS FULL PANEL - Abnormal; Notable for the following components:    POCT pCO2, Venous 53 (*)     POCT pO2, Venous 21 (*)     POCT SO2, Venous 27 (*)     POCT Oxy Hemoglobin, Venous 27.2 (*)     POCT Hematocrit Calculated, Venous 30.0 (*)     POCT Sodium, Venous 135 (*)     POCT Base Excess, Venous 5.3 (*)     POCT HCO3 Calculated, Venous 31.4 (*)     POCT Hemoglobin, Venous 10.0 (*)     POCT Anion Gap, Venous 4.0 (*)     All other components within normal limits   URINALYSIS WITH REFLEX CULTURE AND MICROSCOPIC - Abnormal; Notable for the following components:    Appearance, Urine Hazy (*)     Protein, Urine 100 (2+) (*)     Blood, Urine SMALL (1+) (*)     Urobilinogen, Urine 2.0 (*)     Leukocyte Esterase, Urine LARGE (3+) (*)     All other components within normal limits   MICROSCOPIC ONLY, URINE - Abnormal; Notable for the following components:    WBC, Urine >50 (*)     RBC, Urine >20 (*)     Bacteria, Urine 1+ (*)     Yeast Hyphae, Urine PRESENT (*)     Calcium Oxalate Crystals, Urine 2+ (*)     All other components within normal limits        REPORT TO MIKE VELÁZQUEZ, WILL TAKE THE  PT TO THE FLOOR FROM [facility] PT IS FINISHED WITH HER DINNER.         Ary Mckeon RN  05/02/24 7802

## 2024-05-02 NOTE — ED NOTES
"Pt is somewhat disoriented now vs when she came in. She asked how we knew each other from [place] but when I reoriented her to [facility] ED, she said \"Oh that's right\". I informed RN on 2 E of this progression. Pt otherwise in stable condition and  ready for transport to the floor.      Ary Mckeon RN  05/02/24 0914    "

## 2024-05-02 NOTE — H&P
History Obtained From: Patient and chart    History Of Present Illness:  Qi Davila is a 83 y.o. 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  The patient was sent to the emergency department due to reports of low blood pressure and urinary tract infection patient had been more fatigued at her long-term care facility.  She does have some chronic memory loss but denies abdominal pain fevers chills nausea she states she has not been eating very well.  In the emergency department the patient was found to have sodium of 131 no leukocytosis abnormal urine showing significant number of WBCs with leukocyte esterase patient was also hypotensive reports of blood pressure in the 80s over 40s subsequently given IV fluids with improvement to 90s over 40s and now in the low 100s.  Patient was also given IV Rocephin.  CT scan of abdomen and pelvis was negative for any obstruction there were findings of increased stool in the rectal vault CT of the head was without acute findings chest x-ray also negative for any infiltrate effusion or pulmonary edema.  Patient is to be admitted to continue IV hydration and treatment of her underlying urinary tract infection.           ED Course:  ED Course as of 05/02/24 1806   Thu May 02, 2024   1405 EKG shows sinus rhythm at a rate of 66 bpm no ST change elevation nonischemic EKG, normal intervals. [CF]   1515 IMPRESSION:  No evidence consolidating infiltrate or effusion.   [CF]   1650 Leukocyte Esterase, Urine(!): LARGE (3+) [WJ]   1651 Urinalysis with Reflex Culture and Microscopic(!)  I reviewed the patient's labs and found that she did have leukocyte Estrace concerning for urinary tract infection.  Sepsis recognized at this time with ceftriaxone ordered.  Patient lactic acid was 1.8 did not require 30 cc/kg fluid  bolus.  No endorgan ischemia.  Due to the low blood pressures, will admit. [WJ]   1705 Disimpaction, soft stool [WJ]   1705 Spoke to Paola (POBETHANIE) [phone], updated on plan for admit and results [WJ]      ED Course User Index  [CF] Vera Amaya MD  [WJ] Bertin Cordero,          Diagnoses as of 05/02/24 1806   Urinary tract infection without hematuria, site unspecified   Hypotension, unspecified hypotension type   Constipation, unspecified constipation type     Relevant Results  Results for orders placed or performed during the hospital encounter of 05/02/24 (from the past 24 hour(s))   CBC and Auto Differential   Result Value Ref Range    WBC 9.1 4.4 - 11.3 x10*3/uL    nRBC 0.0 0.0 - 0.0 /100 WBCs    RBC 3.28 (L) 4.00 - 5.20 x10*6/uL    Hemoglobin 10.8 (L) 12.0 - 16.0 g/dL    Hematocrit 33.3 (L) 36.0 - 46.0 %     (H) 80 - 100 fL    MCH 32.9 26.0 - 34.0 pg    MCHC 32.4 32.0 - 36.0 g/dL    RDW 14.6 (H) 11.5 - 14.5 %    Platelets 188 150 - 450 x10*3/uL    Neutrophils % 40.1 40.0 - 80.0 %    Immature Granulocytes %, Automated 0.4 0.0 - 0.9 %    Lymphocytes % 47.3 13.0 - 44.0 %    Monocytes % 11.2 2.0 - 10.0 %    Eosinophils % 0.7 0.0 - 6.0 %    Basophils % 0.3 0.0 - 2.0 %    Neutrophils Absolute 3.65 1.60 - 5.50 x10*3/uL    Immature Granulocytes Absolute, Automated 0.04 0.00 - 0.50 x10*3/uL    Lymphocytes Absolute 4.30 (H) 0.80 - 3.00 x10*3/uL    Monocytes Absolute 1.02 (H) 0.05 - 0.80 x10*3/uL    Eosinophils Absolute 0.06 0.00 - 0.40 x10*3/uL    Basophils Absolute 0.03 0.00 - 0.10 x10*3/uL   Magnesium   Result Value Ref Range    Magnesium 1.76 1.60 - 2.40 mg/dL   Comprehensive metabolic panel   Result Value Ref Range    Glucose 89 74 - 99 mg/dL    Sodium 131 (L) 136 - 145 mmol/L    Potassium 4.2 3.5 - 5.3 mmol/L    Chloride 100 98 - 107 mmol/L    Bicarbonate 27 21 - 32 mmol/L    Anion Gap 8 (L) 10 - 20 mmol/L    Urea Nitrogen 23 6 - 23 mg/dL    Creatinine 0.65 0.50 - 1.05 mg/dL    eGFR 86 >60  mL/min/1.73m*2    Calcium 8.5 (L) 8.6 - 10.3 mg/dL    Albumin 2.8 (L) 3.4 - 5.0 g/dL    Alkaline Phosphatase 42 33 - 136 U/L    Total Protein 9.4 (H) 6.4 - 8.2 g/dL    AST 20 9 - 39 U/L    Bilirubin, Total 0.3 0.0 - 1.2 mg/dL    ALT 13 7 - 45 U/L   Lipase   Result Value Ref Range    Lipase 39 9 - 82 U/L   Lactate   Result Value Ref Range    Lactate 1.8 0.4 - 2.0 mmol/L   B-Type Natriuretic Peptide   Result Value Ref Range    BNP 64 0 - 99 pg/mL   Troponin I, High Sensitivity, Initial   Result Value Ref Range    Troponin I, High Sensitivity 13 0 - 13 ng/L   ECG 12 lead   Result Value Ref Range    Ventricular Rate 66 BPM    Atrial Rate 68 BPM    NJ Interval 166 ms    QRS Duration 90 ms    QT Interval 399 ms    QTC Calculation(Bazett) 418 ms    P Axis 26 degrees    R Axis 1 degrees    T Axis 0 degrees    QRS Count 10 beats    Q Onset 254 ms    T Offset 453 ms    QTC Fredericia 412 ms   Blood Gas Venous Full Panel   Result Value Ref Range    POCT pH, Venous 7.38 7.33 - 7.43 pH    POCT pCO2, Venous 53 (H) 41 - 51 mm Hg    POCT pO2, Venous 21 (L) 35 - 45 mm Hg    POCT SO2, Venous 27 (L) 45 - 75 %    POCT Oxy Hemoglobin, Venous 27.2 (L) 45.0 - 75.0 %    POCT Hematocrit Calculated, Venous 30.0 (L) 36.0 - 46.0 %    POCT Sodium, Venous 135 (L) 136 - 145 mmol/L    POCT Potassium, Venous 4.4 3.5 - 5.3 mmol/L    POCT Chloride, Venous 104 98 - 107 mmol/L    POCT Ionized Calicum, Venous 1.28 1.10 - 1.33 mmol/L    POCT Glucose, Venous 92 74 - 99 mg/dL    POCT Lactate, Venous 1.6 0.4 - 2.0 mmol/L    POCT Base Excess, Venous 5.3 (H) -2.0 - 3.0 mmol/L    POCT HCO3 Calculated, Venous 31.4 (H) 22.0 - 26.0 mmol/L    POCT Hemoglobin, Venous 10.0 (L) 12.0 - 16.0 g/dL    POCT Anion Gap, Venous 4.0 (L) 10.0 - 25.0 mmol/L    Patient Temperature 37.0 degrees Celsius    FiO2 21 %   Influenza A, and B PCR   Result Value Ref Range    Flu A Result Not Detected Not Detected    Flu B Result Not Detected Not Detected   Sars-CoV-2 PCR   Result Value  Ref Range    Coronavirus 2019, PCR Not Detected Not Detected   Troponin, High Sensitivity, 1 Hour   Result Value Ref Range    Troponin I, High Sensitivity 9 0 - 13 ng/L   Urinalysis with Reflex Culture and Microscopic   Result Value Ref Range    Color, Urine Yellow Straw, Yellow    Appearance, Urine Hazy (N) Clear    Specific Gravity, Urine 1.016 1.005 - 1.035    pH, Urine 5.0 5.0, 5.5, 6.0, 6.5, 7.0, 7.5, 8.0    Protein, Urine 100 (2+) (N) NEGATIVE mg/dL    Glucose, Urine NEGATIVE NEGATIVE mg/dL    Blood, Urine SMALL (1+) (A) NEGATIVE    Ketones, Urine NEGATIVE NEGATIVE mg/dL    Bilirubin, Urine NEGATIVE NEGATIVE    Urobilinogen, Urine 2.0 (N) <2.0 mg/dL    Nitrite, Urine NEGATIVE NEGATIVE    Leukocyte Esterase, Urine LARGE (3+) (A) NEGATIVE   Extra Urine Gray Tube   Result Value Ref Range    Extra Tube 293    Microscopic Only, Urine   Result Value Ref Range    WBC, Urine >50 (A) 1-5, NONE /HPF    WBC Clumps, Urine MANY Reference range not established. /HPF    RBC, Urine >20 (A) NONE, 1-2, 3-5 /HPF    Squamous Epithelial Cells, Urine 1-9 (SPARSE) Reference range not established. /HPF    Transitional Epithelial Cells, Urine 1-2 (FEW) Reference range not established. /HPF    Bacteria, Urine 1+ (A) NONE SEEN /HPF    Yeast Hyphae, Urine PRESENT (A) NONE /HPF    Mucus, Urine 1+ Reference range not established. /LPF    Calcium Oxalate Crystals, Urine 2+ (A) NONE, 1+ /HPF      CT abdomen pelvis w IV contrast    Result Date: 5/2/2024  STUDY: CT Abdomen and Pelvis with IV Contrast; 05/02/2024, 3:59 PM. INDICATION: Generalized abdominal pain. COMPARISON: CT AP: 09/28/23. ACCESSION NUMBER(S): [id number] ORDERING CLINICIAN: CELENA JONAS TECHNIQUE: CT of the abdomen and pelvis was performed.  Contiguous axial images were obtained at 3 mm slice thickness through the abdomen and pelvis. Coronal and sagittal reconstructions at 3 mm slice thickness were performed.  Omnipaque 350 75 mL was administered intravenously.   FINDINGS: LOWER CHEST: No cardiomegaly.  No pericardial effusion.  Linear bibasilar areas of atelectasis  ABDOMEN:  LIVER: No hepatomegaly.  Smooth surface contour.  Mild fatty infiltration of the liver.  Stable small hepatic cysts  BILE DUCTS: No intrahepatic or extrahepatic biliary ductal dilatation.  GALLBLADDER: The gallbladder is present without gallstones. STOMACH: Moderate size hiatal hernia.  PANCREAS: Pancreatic parenchymal calcifications  SPLEEN: No splenomegaly or focal splenic lesion.  ADRENAL GLANDS: No thickening or nodules.  KIDNEYS AND URETERS: Kidneys are normal in size and location.  No renal or ureteral calculi.  PELVIS:  BLADDER: No abnormalities identified.  REPRODUCTIVE ORGANS: No abnormalities identified.  BOWEL: Appendix is normal.  Large amount of formed stool in the rectal vault measuring up to 8.3 cm in diameter with mild perirectal fat stranding.  Moderate stool throughout the remainder of the colon.  VESSELS: No abnormalities identified.  Abdominal aorta is normal in caliber. Moderate to severe plaque along the distal aorta.  PERITONEUM/RETROPERITONEUM/LYMPH NODES: No free fluid.  No pneumoperitoneum. No lymphadenopathy.  ABDOMINAL WALL: No abnormalities identified. SOFT TISSUES: No abnormalities identified.  BONES: No acute fracture or aggressive osseous lesion.  Bilateral total hip arthroplasty.  Scoliosis and degenerative change of the lumbar spine.    1. Large amount of formed stool in the rectal vault measuring up to 8.3 cm in diameter with mild perirectal fat stranding. Moderate stool throughout the remainder of the colon. Findings suggestive of fecal impaction with mild associated stercoral proctitis. 2. Mild hepatic steatosis. 3. Moderate size hiatal hernia. 4. Findings of chronic pancreatitis. Signed by Frankie Galindo MD    CT head wo IV contrast    Result Date: 5/2/2024  Interpreted By:  Joesph Ramírez, STUDY: CT HEAD WO IV CONTRAST;  5/2/2024 3:55 pm   INDICATION: Altered  mental status.   COMPARISON: None.   ACCESSION NUMBER(S): [id number]   ORDERING CLINICIAN: CELENA JONAS   TECHNIQUE: Noncontrast axial CT scan of head was performed.   FINDINGS: Parenchyma: There is no intracranial hemorrhage. The grey-white differentiation is intact. There is no mass effect or midline shift. Patchy supratentorial hypodensity, nonspecific, but likely secondary to mild chronic microvascular ischemia.   CSF Spaces: Mild generalized volume loss with concordant ventriculomegaly.   Extra-Axial Fluid: There is no extraaxial fluid collection.   Calvarium: The calvarium is unremarkable.   Paranasal sinuses: Visualized paranasal sinuses are clear.   Mastoids: Clear.   Orbits: Normal.   Soft tissues: Unremarkable.       No acute intracranial hemorrhage, mass effect, or CT apparent acute infarct. Chronic microvascular ischemic and involutional changes.   Signed by: Joesph Ramírez 5/2/2024 4:13 PM Dictation workstation:   [id number]    XR chest 2 views    Result Date: 5/2/2024  STUDY: Chest Radiographs;  5/2/2024 at 14:26 INDICATION: Lethargy, cough. COMPARISON: XR Chest 10/2/23. ACCESSION NUMBER(S): [id number] ORDERING CLINICIAN: CELENA JONAS TECHNIQUE:  Frontal and lateral chest. FINDINGS: CARDIOMEDIASTINAL SILHOUETTE: Cardiomediastinal silhouette is normal in size and configuration.  LUNGS: There is elevation the left hemidiaphragm.  ABDOMEN: No remarkable upper abdominal findings.  BONES: There are degenerative change with joint space loss osteophytes and loose bodies involving both shoulders.    No evidence consolidating infiltrate or effusion. Signed by Christopher Villalta MD    ECG 12 lead    Result Date: 5/2/2024  Sinus rhythm Low voltage, precordial leads     Scheduled medications:    Continuous medications:    PRN medications:        Past Medical History  She has a past medical history of Disease of thyroid gland, Hypertension, Personal history of other mental and behavioral disorders, and  Personal history of other specified conditions (01/10/2022).    Surgical History  She has a past surgical history that includes MR angio head wo IV contrast (1/5/2012).     Social History  She reports that she has never smoked. She has never used smokeless tobacco. She reports that she does not use drugs. No history on file for alcohol use.    Family History  No family history on file.     Allergies  Hydrocodone, Hydrocodone-acetaminophen, Latex, Penicillins, and Prednisone    Code Status  No Order     Review of Systems   Reason unable to perform ROS: Review of systems is unreliable due to patient with memory loss.   Constitutional:  Positive for appetite change and fatigue. Negative for chills and fever.   Respiratory:  Negative for shortness of breath.    Cardiovascular:  Negative for chest pain.   Gastrointestinal:  Negative for abdominal pain, nausea and vomiting.   Neurological:  Positive for light-headedness.       Last Recorded Vitals  /56   Pulse 74   Temp 36.5 °C (97.7 °F) (Oral)   Resp 16   Wt 81.6 kg (180 lb)   SpO2 95%      Physical Exam  Vitals reviewed.   Constitutional:       General: She is not in acute distress.  HENT:      Head: Normocephalic and atraumatic.      Right Ear: External ear normal.      Left Ear: External ear normal.      Nose: Nose normal.      Mouth/Throat:      Mouth: Mucous membranes are moist.      Pharynx: Oropharynx is clear.   Eyes:      General: No scleral icterus.     Extraocular Movements: Extraocular movements intact.      Conjunctiva/sclera: Conjunctivae normal.      Pupils: Pupils are equal, round, and reactive to light.   Neck:      Vascular: No carotid bruit.   Cardiovascular:      Rate and Rhythm: Normal rate and regular rhythm.      Pulses: Normal pulses.      Heart sounds: Normal heart sounds.   Pulmonary:      Effort: Pulmonary effort is normal. No respiratory distress.      Breath sounds: Normal breath sounds. No wheezing, rhonchi or rales.   Chest:       Chest wall: No tenderness.   Abdominal:      General: Bowel sounds are normal. There is no distension.      Palpations: Abdomen is soft. There is no mass.      Tenderness: There is no abdominal tenderness. There is no rebound.   Genitourinary:     Comments: Samson catheter placed  Musculoskeletal:         General: No swelling or deformity. Normal range of motion.      Cervical back: Normal range of motion.   Skin:     General: Skin is warm and dry.      Capillary Refill: Capillary refill takes less than 2 seconds.      Comments: Dressed wound left lower extremity   Neurological:      General: No focal deficit present.      Mental Status: She is alert. Mental status is at baseline.      Cranial Nerves: Cranial nerve deficit present.      Sensory: Sensory deficit present.   Psychiatric:         Mood and Affect: Mood normal.         Behavior: Behavior normal.         Assessment/Plan   Principal Problem:    Complicated UTI (urinary tract infection)    Complicated catheter associated urinary tract infection present on admission  Continue patient on IV Rocephin pending culture results  Patient unable to elaborate exactly why she has catheter in place, appears to be related to chronic incontinence and wounds  Will request wound care consultation    Hypotension unclear if related to sepsis or poor intake  Patient had good response to IV fluids in emergency department  Continue supplemental IV fluids for now  Monitor blood pressure closely  Avoid hypotensive medications    Slow transit constipation  Patient appeared to have fecal impaction on diagnostic imaging  Patient was partially disimpacted from the ER report is remaining stool is soft  We will continue patient on MiraLAX  Will give patient 10 mg Dulcolax to promote bowel movement    Major depressive disorder with history of psychosis  Continue home medications for reconciliation process      No new Assessment & Plan notes have been filed under this hospital service  since the last note was generated.  Service: Internal Medicine          Fredi Howell, APRN-CNP    Dragon dictation software was used to dictate this note and thus there may be minor errors in translation/transcription including garbled speech or misspellings. Please contact for clarification if needed.

## 2024-05-02 NOTE — ED PROVIDER NOTES
"HPI   Chief Complaint   Patient presents with    Hypotension       This is a 83-year-old female with a past with history of hypertension, hypothyroidism, hyponatremia, psychosis who presents emerged department for hypotension.  Patient has a Samson catheter but is unsure of when it was last changed.  Patient supposedly being treated for her sediment in her urine.  She has been more tired lately going to the facility.  Pressures in the low 90s she was given 500 by EMS is now receiving her second 500 bolus bag.  She does appear tired on exam.  She denies any issues and only states that she is \"thirsty\"                          [place] Coma Scale Score: 15                  Patient History   Past Medical History:   Diagnosis Date    Disease of thyroid gland     Hypertension     Personal history of other mental and behavioral disorders     History of psychiatric hospitalization    Personal history of other specified conditions 01/10/2022    History of insomnia     Past Surgical History:   Procedure Laterality Date    MR HEAD ANGIO WO IV CONTRAST  1/5/2012    MR HEAD ANGIO WO IV CONTRAST 1/5/2012 [facility] CL[facility]ICAL [facility]GAC[facility]     No family history on file.  Social History     Tobacco Use    Smoking status: Never    Smokeless tobacco: Never   Vaping Use    Vaping status: Never Used   Substance Use Topics    Alcohol use: Not on file    Drug use: Never       Physical Exam   ED Triage Vitals [05/02/24 1324]   Temperature Heart Rate Respirations BP   36.5 °C (97.7 °F) 62 18 (!) 94/43      Pulse Ox Temp Source Heart Rate Source Patient Position   96 % Oral Monitor --      BP Location FiO2 (%)     -- --       Physical Exam  Constitutional:       General: She is not in acute distress.  HENT:      Head: Normocephalic and atraumatic.      Right Ear: Tympanic membrane normal.      Left Ear: Tympanic membrane normal.      Mouth/Throat:      Mouth: Mucous membranes are moist.   Eyes:      Extraocular Movements: Extraocular movements intact.     "  Conjunctiva/sclera: Conjunctivae normal.      Pupils: Pupils are equal, round, and reactive to light.   Cardiovascular:      Rate and Rhythm: Normal rate and regular rhythm.      Heart sounds: No murmur heard.  Pulmonary:      Effort: Pulmonary effort is normal. No respiratory distress.      Breath sounds: Normal breath sounds. No stridor. No wheezing or rales.   Abdominal:      General: Bowel sounds are normal. There is no distension.      Tenderness: There is no abdominal tenderness. There is no guarding or rebound.   Musculoskeletal:         General: No swelling, tenderness or deformity. Normal range of motion.   Skin:     General: Skin is warm and dry.      Coloration: Skin is not jaundiced.      Findings: No bruising or lesion.   Neurological:      General: No focal deficit present.      Mental Status: She is alert. Mental status is at baseline.      Cranial Nerves: No cranial nerve deficit.      Motor: No weakness.   Psychiatric:         Mood and Affect: Mood normal.       Labs Reviewed - No data to display  No orders to display       ED Course & [facility]   ED Course as of 05/08/24 0547   Thu May 02, 2024   1405 EKG shows sinus rhythm at a rate of 66 bpm no ST change elevation nonischemic EKG, normal intervals. [CF]   1515 IMPRESSION:  No evidence consolidating infiltrate or effusion.   [CF]   1650 Leukocyte Esterase, Urine(!): LARGE (3+) [WJ]   1651 Urinalysis with Reflex Culture and Microscopic(!)  I reviewed the patient's labs and found that she did have leukocyte Estrace concerning for urinary tract infection.  Sepsis recognized at this time with ceftriaxone ordered.  Patient lactic acid was 1.8 did not require 30 cc/kg fluid bolus.  No endorgan ischemia.  Due to the low blood pressures, will admit. [WJ]   1705 Disimpaction, soft stool [WJ]   1705 Spoke to Paola (POA) [phone], updated on plan for admit and results [WJ]      ED Course User Index  [CF] Vera Amaya MD  [WJ] Bertin Cordero DO          Diagnoses as of 05/08/24 0552   Urinary tract infection without hematuria, site unspecified   Hypotension, unspecified hypotension type   Constipation, unspecified constipation type       Medical Decision Making  This is an 83-year-old female who presented to the emergency department for hypotension.  She does have a chronic Samson catheter that was placed about 2 weeks ago.  She been given 2 L and has been responsive to fluid.  Samson catheter was exchanged.  Patient has no leukocytosis.  Her urine is still pending at this time patient was signed out pending CT of her head and abdomen along with a chest x-ray and her urine.        Procedure  Procedures     Vera Amaya MD  05/08/24 4417

## 2024-05-03 PROBLEM — N39.0 URINARY TRACT INFECTION WITHOUT HEMATURIA, SITE UNSPECIFIED: Status: ACTIVE | Noted: 2024-05-03

## 2024-05-03 LAB
ANION GAP SERPL CALC-SCNC: 10 MMOL/L (ref 10–20)
BUN SERPL-MCNC: 21 MG/DL (ref 6–23)
CALCIUM SERPL-MCNC: 8.9 MG/DL (ref 8.6–10.3)
CHLORIDE SERPL-SCNC: 102 MMOL/L (ref 98–107)
CO2 SERPL-SCNC: 26 MMOL/L (ref 21–32)
CREAT SERPL-MCNC: 0.45 MG/DL (ref 0.5–1.05)
EGFRCR SERPLBLD CKD-EPI 2021: >90 ML/MIN/1.73M*2
ERYTHROCYTE [DISTWIDTH] IN BLOOD BY AUTOMATED COUNT: 14.5 % (ref 11.5–14.5)
GLUCOSE SERPL-MCNC: 79 MG/DL (ref 74–99)
HCT VFR BLD AUTO: 29.9 % (ref 36–46)
HGB BLD-MCNC: 9.6 G/DL (ref 12–16)
MCH RBC QN AUTO: 32.1 PG (ref 26–34)
MCHC RBC AUTO-ENTMCNC: 32.1 G/DL (ref 32–36)
MCV RBC AUTO: 100 FL (ref 80–100)
NRBC BLD-RTO: 0 /100 WBCS (ref 0–0)
PLATELET # BLD AUTO: 176 X10*3/UL (ref 150–450)
POTASSIUM SERPL-SCNC: 3.8 MMOL/L (ref 3.5–5.3)
RBC # BLD AUTO: 2.99 X10*6/UL (ref 4–5.2)
SODIUM SERPL-SCNC: 134 MMOL/L (ref 136–145)
WBC # BLD AUTO: 8.2 X10*3/UL (ref 4.4–11.3)

## 2024-05-03 PROCEDURE — 99233 SBSQ HOSP IP/OBS HIGH 50: CPT | Performed by: PHYSICIAN ASSISTANT

## 2024-05-03 PROCEDURE — 2500000001 HC RX 250 WO HCPCS SELF ADMINISTERED DRUGS (ALT 637 FOR MEDICARE OP): Performed by: PHYSICIAN ASSISTANT

## 2024-05-03 PROCEDURE — 36415 COLL VENOUS BLD VENIPUNCTURE: CPT | Performed by: NURSE PRACTITIONER

## 2024-05-03 PROCEDURE — 80048 BASIC METABOLIC PNL TOTAL CA: CPT | Performed by: NURSE PRACTITIONER

## 2024-05-03 PROCEDURE — 2500000001 HC RX 250 WO HCPCS SELF ADMINISTERED DRUGS (ALT 637 FOR MEDICARE OP): Performed by: NURSE PRACTITIONER

## 2024-05-03 PROCEDURE — 2500000004 HC RX 250 GENERAL PHARMACY W/ HCPCS (ALT 636 FOR OP/ED): Performed by: NURSE PRACTITIONER

## 2024-05-03 PROCEDURE — 85027 COMPLETE CBC AUTOMATED: CPT | Performed by: NURSE PRACTITIONER

## 2024-05-03 PROCEDURE — 1210000001 HC SEMI-PRIVATE ROOM DAILY

## 2024-05-03 RX ORDER — PROPRANOLOL HYDROCHLORIDE 10 MG/1
20 TABLET ORAL 2 TIMES DAILY
Status: DISCONTINUED | OUTPATIENT
Start: 2024-05-03 | End: 2024-05-09 | Stop reason: HOSPADM

## 2024-05-03 RX ORDER — BRIMONIDINE TARTRATE 2 MG/ML
1 SOLUTION/ DROPS OPHTHALMIC 2 TIMES DAILY
Status: DISCONTINUED | OUTPATIENT
Start: 2024-05-03 | End: 2024-05-09 | Stop reason: HOSPADM

## 2024-05-03 RX ORDER — DIVALPROEX SODIUM 125 MG/1
125 CAPSULE, COATED PELLETS ORAL 2 TIMES DAILY
Status: DISCONTINUED | OUTPATIENT
Start: 2024-05-03 | End: 2024-05-09 | Stop reason: HOSPADM

## 2024-05-03 RX ORDER — TRAZODONE HYDROCHLORIDE 50 MG/1
25 TABLET ORAL NIGHTLY
Status: DISCONTINUED | OUTPATIENT
Start: 2024-05-03 | End: 2024-05-09 | Stop reason: HOSPADM

## 2024-05-03 RX ORDER — ARIPIPRAZOLE 5 MG/1
2.5 TABLET ORAL DAILY
Status: DISCONTINUED | OUTPATIENT
Start: 2024-05-03 | End: 2024-05-09 | Stop reason: HOSPADM

## 2024-05-03 RX ORDER — TIMOLOL MALEATE 5 MG/ML
1 SOLUTION/ DROPS OPHTHALMIC DAILY
Status: DISCONTINUED | OUTPATIENT
Start: 2024-05-03 | End: 2024-05-09 | Stop reason: HOSPADM

## 2024-05-03 RX ADMIN — BRIMONIDINE TARTRATE 1 DROP: 2 SOLUTION/ DROPS OPHTHALMIC at 11:55

## 2024-05-03 RX ADMIN — SODIUM CHLORIDE, POTASSIUM CHLORIDE, SODIUM LACTATE AND CALCIUM CHLORIDE 100 ML/HR: 600; 310; 30; 20 INJECTION, SOLUTION INTRAVENOUS at 06:25

## 2024-05-03 RX ADMIN — DIVALPROEX SODIUM 125 MG: 125 CAPSULE, COATED PELLETS ORAL at 20:49

## 2024-05-03 RX ADMIN — BRIMONIDINE TARTRATE 1 DROP: 2 SOLUTION/ DROPS OPHTHALMIC at 20:49

## 2024-05-03 RX ADMIN — DIVALPROEX SODIUM 125 MG: 125 CAPSULE, COATED PELLETS ORAL at 11:56

## 2024-05-03 RX ADMIN — ASPIRIN 81 MG CHEWABLE TABLET 81 MG: 81 TABLET CHEWABLE at 08:17

## 2024-05-03 RX ADMIN — CEFTRIAXONE SODIUM 1 G: 1 INJECTION, SOLUTION INTRAVENOUS at 17:19

## 2024-05-03 RX ADMIN — TIMOLOL MALEATE 1 DROP: 5 SOLUTION/ DROPS OPHTHALMIC at 11:56

## 2024-05-03 RX ADMIN — TRAZODONE HYDROCHLORIDE 25 MG: 50 TABLET ORAL at 20:49

## 2024-05-03 RX ADMIN — PROPRANOLOL HYDROCHLORIDE 20 MG: 10 TABLET ORAL at 20:49

## 2024-05-03 RX ADMIN — LEVOTHYROXINE SODIUM 25 MCG: 0.03 TABLET ORAL at 06:26

## 2024-05-03 RX ADMIN — PROPRANOLOL HYDROCHLORIDE 20 MG: 10 TABLET ORAL at 11:57

## 2024-05-03 RX ADMIN — ARIPIPRAZOLE 2.5 MG: 5 TABLET ORAL at 11:56

## 2024-05-03 RX ADMIN — ENOXAPARIN SODIUM 40 MG: 40 INJECTION SUBCUTANEOUS at 20:49

## 2024-05-03 ASSESSMENT — ENCOUNTER SYMPTOMS
FEVER: 0
BLOOD IN STOOL: 0
DIARRHEA: 0
LIGHT-HEADEDNESS: 0
NAUSEA: 0
HALLUCINATIONS: 0
VOMITING: 0
FATIGUE: 0
ABDOMINAL PAIN: 0
HEMATURIA: 0
BRUISES/BLEEDS EASILY: 0
WEAKNESS: 0
COUGH: 0
CHEST TIGHTNESS: 0
TROUBLE SWALLOWING: 0
SORE THROAT: 0
WHEEZING: 0
FREQUENCY: 0
FACIAL SWELLING: 0
PALPITATIONS: 0
CONSTIPATION: 0
CHILLS: 0
WOUND: 0
FLANK PAIN: 0
JOINT SWELLING: 0
DIAPHORESIS: 0
SHORTNESS OF BREATH: 0
NUMBNESS: 0
HEADACHES: 0
BACK PAIN: 0
DIZZINESS: 0
EYE PAIN: 0
APPETITE CHANGE: 0
DYSURIA: 0

## 2024-05-03 ASSESSMENT — COGNITIVE AND FUNCTIONAL STATUS - GENERAL
DRESSING REGULAR UPPER BODY CLOTHING: A LOT
MOVING TO AND FROM BED TO CHAIR: TOTAL
MOBILITY SCORE: 8
STANDING UP FROM CHAIR USING ARMS: TOTAL
PERSONAL GROOMING: A LITTLE
DRESSING REGULAR LOWER BODY CLOTHING: A LOT
TOILETING: A LOT
MOVING FROM LYING ON BACK TO SITTING ON SIDE OF FLAT BED WITH BEDRAILS: A LOT
CLIMB 3 TO 5 STEPS WITH RAILING: TOTAL
WALKING IN HOSPITAL ROOM: TOTAL
TURNING FROM BACK TO SIDE WHILE IN FLAT BAD: A LOT
HELP NEEDED FOR BATHING: A LOT
DAILY ACTIVITIY SCORE: 15

## 2024-05-03 ASSESSMENT — PAIN SCALES - GENERAL
PAINLEVEL_OUTOF10: 0 - NO PAIN
PAINLEVEL_OUTOF10: 0 - NO PAIN

## 2024-05-03 ASSESSMENT — PAIN - FUNCTIONAL ASSESSMENT: PAIN_FUNCTIONAL_ASSESSMENT: 0-10

## 2024-05-03 NOTE — CARE PLAN
The patient's goals for the shift include      The clinical goals for the shift include to remain comfortable      Problem: Safety - Adult  Goal: Free from fall injury  Outcome: Progressing     Problem: Discharge Planning  Goal: Discharge to home or other facility with appropriate resources  Outcome: Progressing     Problem: Chronic Conditions and Co-morbidities  Goal: Patient's chronic conditions and co-morbidity symptoms are monitored and maintained or improved  Outcome: Progressing     Problem: Skin  Goal: Decreased wound size/increased tissue granulation at next dressing change  Outcome: Progressing  Goal: Participates in plan/prevention/treatment measures  Outcome: Progressing  Goal: Prevent/manage excess moisture  Outcome: Progressing  Goal: Prevent/minimize sheer/friction injuries  Outcome: Progressing  Flowsheets (Taken 5/3/2024 1341)  Prevent/minimize sheer/friction injuries:   HOB 30 degrees or less   Turn/reposition every 2 hours/use positioning/transfer devices  Goal: Promote/optimize nutrition  Outcome: Progressing  Goal: Promote skin healing  Outcome: Progressing     Problem: Nutrition  Goal: Oral intake greater than 50%  Outcome: Progressing  Goal: Oral intake greater 75%  Outcome: Progressing  Goal: Consume prescribed supplement  Outcome: Progressing  Goal: Adequate PO fluid intake  Outcome: Progressing  Goal: Nutrition support goals are met within 48 hrs  Outcome: Progressing  Goal: Nutrition support is meeting 75% of nutrient needs  Outcome: Progressing  Goal: Tube feed tolerance  Outcome: Progressing  Goal: BG  mg/dL  Outcome: Progressing  Goal: Lab values WNL  Outcome: Progressing  Goal: Electrolytes WNL  Outcome: Progressing  Goal: Promote healing  Outcome: Progressing  Goal: Maintain stable weight  Outcome: Progressing  Goal: Reduce weight from edema/fluid  Outcome: Progressing  Goal: Gradual weight gain  Outcome: Progressing  Goal: Improve ostomy output  Outcome: Progressing

## 2024-05-03 NOTE — PROGRESS NOTES
Qi Davila is a 83 y.o. female on day 0 of admission presenting with Complicated UTI (urinary tract infection).      Subjective   Qi Davila is a 83 y.o. 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  The patient was sent to the emergency department due to reports of low blood pressure and urinary tract infection patient had been more fatigued at her long-term care facility.  She does have some chronic memory loss but denies abdominal pain fevers chills nausea she states she has not been eating very well. Patient was also hypotensive reports of blood pressure in the 80s over 40s subsequently given IV fluids with improvement to 90s over 40s and now in the low 100s. CT scan of abdomen and pelvis was negative for any obstruction there were findings of increased stool in the rectal vault CT of the head was without acute findings chest x-ray also negative for any infiltrate effusion or pulmonary edema.  EKG shows sinus rhythm at a rate of 66 bpm no ST change elevation nonischemic EKG, normal intervals. Lactate negative. Manual disimpaction in ED @1705- report is remaining stool is soft.     5/3/2024: No acute events overnight. Vitals stable, no further hypotension, /81 this morning. Labs largely unremarkable.  No leukocytosis. Does have some anemia, suspect element of hemodilution. She denies any acute complaints,s he is actually oriented x2-2.5       Review of Systems   Constitutional:  Negative for appetite change, chills, diaphoresis, fatigue and fever.   HENT:  Negative for congestion, ear pain, facial swelling, hearing loss, nosebleeds, sore throat, tinnitus and trouble swallowing.    Eyes:  Negative for pain.   Respiratory:  Negative for cough, chest tightness, shortness of breath and wheezing.    Cardiovascular:  Negative for  chest pain, palpitations and leg swelling.   Gastrointestinal:  Negative for abdominal pain, blood in stool, constipation, diarrhea, nausea and vomiting.   Genitourinary:  Negative for dysuria, flank pain, frequency, hematuria and urgency.   Musculoskeletal:  Negative for back pain and joint swelling.   Skin:  Negative for rash and wound.   Neurological:  Negative for dizziness, syncope, weakness, light-headedness, numbness and headaches.   Hematological:  Does not bruise/bleed easily.   Psychiatric/Behavioral:  Negative for behavioral problems, hallucinations and suicidal ideas.           Objective     Last Recorded Vitals  /81 (BP Location: Right arm, Patient Position: Lying)   Pulse 88   Temp 36.6 °C (97.8 °F) (Temporal)   Resp 18   Wt 81.6 kg (180 lb)   SpO2 95%     Image Results  CT abdomen pelvis w IV contrast    Result Date: 5/2/2024  STUDY: CT Abdomen and Pelvis with IV Contrast; 05/02/2024, 3:59 PM. INDICATION: Generalized abdominal pain. COMPARISON: CT AP: 09/28/23. ACCESSION NUMBER(S): [id number] ORDERING CLINICIAN: CELENA JONAS TECHNIQUE: CT of the abdomen and pelvis was performed.  Contiguous axial images were obtained at 3 mm slice thickness through the abdomen and pelvis. Coronal and sagittal reconstructions at 3 mm slice thickness were performed.  Omnipaque 350 75 mL was administered intravenously.  FINDINGS: LOWER CHEST: No cardiomegaly.  No pericardial effusion.  Linear bibasilar areas of atelectasis  ABDOMEN:  LIVER: No hepatomegaly.  Smooth surface contour.  Mild fatty infiltration of the liver.  Stable small hepatic cysts  BILE DUCTS: No intrahepatic or extrahepatic biliary ductal dilatation.  GALLBLADDER: The gallbladder is present without gallstones. STOMACH: Moderate size hiatal hernia.  PANCREAS: Pancreatic parenchymal calcifications  SPLEEN: No splenomegaly or focal splenic lesion.  ADRENAL GLANDS: No thickening or nodules.  KIDNEYS AND URETERS: Kidneys are normal in size  and location.  No renal or ureteral calculi.  PELVIS:  BLADDER: No abnormalities identified.  REPRODUCTIVE ORGANS: No abnormalities identified.  BOWEL: Appendix is normal.  Large amount of formed stool in the rectal vault measuring up to 8.3 cm in diameter with mild perirectal fat stranding.  Moderate stool throughout the remainder of the colon.  VESSELS: No abnormalities identified.  Abdominal aorta is normal in caliber. Moderate to severe plaque along the distal aorta.  PERITONEUM/RETROPERITONEUM/LYMPH NODES: No free fluid.  No pneumoperitoneum. No lymphadenopathy.  ABDOMINAL WALL: No abnormalities identified. SOFT TISSUES: No abnormalities identified.  BONES: No acute fracture or aggressive osseous lesion.  Bilateral total hip arthroplasty.  Scoliosis and degenerative change of the lumbar spine.    1. Large amount of formed stool in the rectal vault measuring up to 8.3 cm in diameter with mild perirectal fat stranding. Moderate stool throughout the remainder of the colon. Findings suggestive of fecal impaction with mild associated stercoral proctitis. 2. Mild hepatic steatosis. 3. Moderate size hiatal hernia. 4. Findings of chronic pancreatitis. Signed by Frankie Galindo MD    CT head wo IV contrast    Result Date: 5/2/2024  Interpreted By:  Joesph Ramírez, STUDY: CT HEAD WO IV CONTRAST;  5/2/2024 3:55 pm   INDICATION: Altered mental status.   COMPARISON: None.   ACCESSION NUMBER(S): [id number]   ORDERING CLINICIAN: CELENA JONAS   TECHNIQUE: Noncontrast axial CT scan of head was performed.   FINDINGS: Parenchyma: There is no intracranial hemorrhage. The grey-white differentiation is intact. There is no mass effect or midline shift. Patchy supratentorial hypodensity, nonspecific, but likely secondary to mild chronic microvascular ischemia.   CSF Spaces: Mild generalized volume loss with concordant ventriculomegaly.   Extra-Axial Fluid: There is no extraaxial fluid collection.   Calvarium: The calvarium  is unremarkable.   Paranasal sinuses: Visualized paranasal sinuses are clear.   Mastoids: Clear.   Orbits: Normal.   Soft tissues: Unremarkable.       No acute intracranial hemorrhage, mass effect, or CT apparent acute infarct. Chronic microvascular ischemic and involutional changes.   Signed by: Joesph Ramírez 5/2/2024 4:13 PM Dictation workstation:   [id number]    XR chest 2 views    Result Date: 5/2/2024  STUDY: Chest Radiographs;  5/2/2024 at 14:26 INDICATION: Lethargy, cough. COMPARISON: XR Chest 10/2/23. ACCESSION NUMBER(S): [id number] ORDERING CLINICIAN: CELENA JONAS TECHNIQUE:  Frontal and lateral chest. FINDINGS: CARDIOMEDIASTINAL SILHOUETTE: Cardiomediastinal silhouette is normal in size and configuration.  LUNGS: There is elevation the left hemidiaphragm.  ABDOMEN: No remarkable upper abdominal findings.  BONES: There are degenerative change with joint space loss osteophytes and loose bodies involving both shoulders.    No evidence consolidating infiltrate or effusion. Signed by Christopher Villalta MD    ECG 12 lead    Result Date: 5/2/2024  Sinus rhythm Low voltage, precordial leads       Lab Results  Results for orders placed or performed during the hospital encounter of 05/02/24 (from the past 24 hour(s))   CBC and Auto Differential   Result Value Ref Range    WBC 9.1 4.4 - 11.3 x10*3/uL    nRBC 0.0 0.0 - 0.0 /100 WBCs    RBC 3.28 (L) 4.00 - 5.20 x10*6/uL    Hemoglobin 10.8 (L) 12.0 - 16.0 g/dL    Hematocrit 33.3 (L) 36.0 - 46.0 %     (H) 80 - 100 fL    MCH 32.9 26.0 - 34.0 pg    MCHC 32.4 32.0 - 36.0 g/dL    RDW 14.6 (H) 11.5 - 14.5 %    Platelets 188 150 - 450 x10*3/uL    Neutrophils % 40.1 40.0 - 80.0 %    Immature Granulocytes %, Automated 0.4 0.0 - 0.9 %    Lymphocytes % 47.3 13.0 - 44.0 %    Monocytes % 11.2 2.0 - 10.0 %    Eosinophils % 0.7 0.0 - 6.0 %    Basophils % 0.3 0.0 - 2.0 %    Neutrophils Absolute 3.65 1.60 - 5.50 x10*3/uL    Immature Granulocytes Absolute, Automated 0.04  0.00 - 0.50 x10*3/uL    Lymphocytes Absolute 4.30 (H) 0.80 - 3.00 x10*3/uL    Monocytes Absolute 1.02 (H) 0.05 - 0.80 x10*3/uL    Eosinophils Absolute 0.06 0.00 - 0.40 x10*3/uL    Basophils Absolute 0.03 0.00 - 0.10 x10*3/uL   Magnesium   Result Value Ref Range    Magnesium 1.76 1.60 - 2.40 mg/dL   Comprehensive metabolic panel   Result Value Ref Range    Glucose 89 74 - 99 mg/dL    Sodium 131 (L) 136 - 145 mmol/L    Potassium 4.2 3.5 - 5.3 mmol/L    Chloride 100 98 - 107 mmol/L    Bicarbonate 27 21 - 32 mmol/L    Anion Gap 8 (L) 10 - 20 mmol/L    Urea Nitrogen 23 6 - 23 mg/dL    Creatinine 0.65 0.50 - 1.05 mg/dL    eGFR 86 >60 mL/min/1.73m*2    Calcium 8.5 (L) 8.6 - 10.3 mg/dL    Albumin 2.8 (L) 3.4 - 5.0 g/dL    Alkaline Phosphatase 42 33 - 136 U/L    Total Protein 9.4 (H) 6.4 - 8.2 g/dL    AST 20 9 - 39 U/L    Bilirubin, Total 0.3 0.0 - 1.2 mg/dL    ALT 13 7 - 45 U/L   Lipase   Result Value Ref Range    Lipase 39 9 - 82 U/L   Lactate   Result Value Ref Range    Lactate 1.8 0.4 - 2.0 mmol/L   B-Type Natriuretic Peptide   Result Value Ref Range    BNP 64 0 - 99 pg/mL   Troponin I, High Sensitivity, Initial   Result Value Ref Range    Troponin I, High Sensitivity 13 0 - 13 ng/L   ECG 12 lead   Result Value Ref Range    Ventricular Rate 66 BPM    Atrial Rate 68 BPM    MT Interval 166 ms    QRS Duration 90 ms    QT Interval 399 ms    QTC Calculation(Bazett) 418 ms    P Axis 26 degrees    R Axis 1 degrees    T Axis 0 degrees    QRS Count 10 beats    Q Onset 254 ms    T Offset 453 ms    QTC Fredericia 412 ms   Blood Gas Venous Full Panel   Result Value Ref Range    POCT pH, Venous 7.38 7.33 - 7.43 pH    POCT pCO2, Venous 53 (H) 41 - 51 mm Hg    POCT pO2, Venous 21 (L) 35 - 45 mm Hg    POCT SO2, Venous 27 (L) 45 - 75 %    POCT Oxy Hemoglobin, Venous 27.2 (L) 45.0 - 75.0 %    POCT Hematocrit Calculated, Venous 30.0 (L) 36.0 - 46.0 %    POCT Sodium, Venous 135 (L) 136 - 145 mmol/L    POCT Potassium, Venous 4.4 3.5 - 5.3  mmol/L    POCT Chloride, Venous 104 98 - 107 mmol/L    POCT Ionized Calicum, Venous 1.28 1.10 - 1.33 mmol/L    POCT Glucose, Venous 92 74 - 99 mg/dL    POCT Lactate, Venous 1.6 0.4 - 2.0 mmol/L    POCT Base Excess, Venous 5.3 (H) -2.0 - 3.0 mmol/L    POCT HCO3 Calculated, Venous 31.4 (H) 22.0 - 26.0 mmol/L    POCT Hemoglobin, Venous 10.0 (L) 12.0 - 16.0 g/dL    POCT Anion Gap, Venous 4.0 (L) 10.0 - 25.0 mmol/L    Patient Temperature 37.0 degrees Celsius    FiO2 21 %   Influenza A, and B PCR   Result Value Ref Range    Flu A Result Not Detected Not Detected    Flu B Result Not Detected Not Detected   Sars-CoV-2 PCR   Result Value Ref Range    Coronavirus 2019, PCR Not Detected Not Detected   Troponin, High Sensitivity, 1 Hour   Result Value Ref Range    Troponin I, High Sensitivity 9 0 - 13 ng/L   Urinalysis with Reflex Culture and Microscopic   Result Value Ref Range    Color, Urine Yellow Straw, Yellow    Appearance, Urine Hazy (N) Clear    Specific Gravity, Urine 1.016 1.005 - 1.035    pH, Urine 5.0 5.0, 5.5, 6.0, 6.5, 7.0, 7.5, 8.0    Protein, Urine 100 (2+) (N) NEGATIVE mg/dL    Glucose, Urine NEGATIVE NEGATIVE mg/dL    Blood, Urine SMALL (1+) (A) NEGATIVE    Ketones, Urine NEGATIVE NEGATIVE mg/dL    Bilirubin, Urine NEGATIVE NEGATIVE    Urobilinogen, Urine 2.0 (N) <2.0 mg/dL    Nitrite, Urine NEGATIVE NEGATIVE    Leukocyte Esterase, Urine LARGE (3+) (A) NEGATIVE   Extra Urine Gray Tube   Result Value Ref Range    Extra Tube 293    Microscopic Only, Urine   Result Value Ref Range    WBC, Urine >50 (A) 1-5, NONE /HPF    WBC Clumps, Urine MANY Reference range not established. /HPF    RBC, Urine >20 (A) NONE, 1-2, 3-5 /HPF    Squamous Epithelial Cells, Urine 1-9 (SPARSE) Reference range not established. /HPF    Transitional Epithelial Cells, Urine 1-2 (FEW) Reference range not established. /HPF    Bacteria, Urine 1+ (A) NONE SEEN /HPF    Yeast Hyphae, Urine PRESENT (A) NONE /HPF    Mucus, Urine 1+ Reference range  not established. /LPF    Calcium Oxalate Crystals, Urine 2+ (A) NONE, 1+ /HPF   Blood Culture    Specimen: Peripheral Venipuncture; Blood culture   Result Value Ref Range    Blood Culture Loaded on Instrument - Culture in progress    Blood Culture    Specimen: Peripheral Venipuncture; Blood culture   Result Value Ref Range    Blood Culture Loaded on Instrument - Culture in progress    Basic metabolic panel   Result Value Ref Range    Glucose 79 74 - 99 mg/dL    Sodium 134 (L) 136 - 145 mmol/L    Potassium 3.8 3.5 - 5.3 mmol/L    Chloride 102 98 - 107 mmol/L    Bicarbonate 26 21 - 32 mmol/L    Anion Gap 10 10 - 20 mmol/L    Urea Nitrogen 21 6 - 23 mg/dL    Creatinine 0.45 (L) 0.50 - 1.05 mg/dL    eGFR >90 >60 mL/min/1.73m*2    Calcium 8.9 8.6 - 10.3 mg/dL   CBC   Result Value Ref Range    WBC 8.2 4.4 - 11.3 x10*3/uL    nRBC 0.0 0.0 - 0.0 /100 WBCs    RBC 2.99 (L) 4.00 - 5.20 x10*6/uL    Hemoglobin 9.6 (L) 12.0 - 16.0 g/dL    Hematocrit 29.9 (L) 36.0 - 46.0 %     80 - 100 fL    MCH 32.1 26.0 - 34.0 pg    MCHC 32.1 32.0 - 36.0 g/dL    RDW 14.5 11.5 - 14.5 %    Platelets 176 150 - 450 x10*3/uL        Medications  Scheduled medications:  ARIPiprazole, 2.5 mg, oral, Daily  aspirin, 81 mg, oral, Daily  brimonidine, 1 drop, Both Eyes, BID  cefTRIAXone, 1 g, intravenous, q24h  divalproex sprinkle, 125 mg, oral, BID  enoxaparin, 40 mg, subcutaneous, q24h  polyethylene glycol, 17 g, oral, Daily  propranolol, 20 mg, oral, BID  timolol, 1 drop, Both Eyes, Daily  traZODone, 25 mg, oral, Nightly      Continuous medications:  lactated Ringer's, 100 mL/hr, Last Rate: 100 mL/hr (05/03/24 1001)      PRN medications:       Physical Exam  Constitutional:       General: She is not in acute distress.     Appearance: Normal appearance.      Comments: Elderly female, siting upright in bed   HENT:      Head: Normocephalic and atraumatic.      Right Ear: External ear normal.      Left Ear: External ear normal.      Nose: Nose normal.       Mouth/Throat:      Mouth: Mucous membranes are moist.      Pharynx: Oropharynx is clear.   Eyes:      Extraocular Movements: Extraocular movements intact.      Conjunctiva/sclera: Conjunctivae normal.      Pupils: Pupils are equal, round, and reactive to light.   Cardiovascular:      Rate and Rhythm: Normal rate and regular rhythm.      Pulses: Normal pulses.      Heart sounds: Normal heart sounds.   Pulmonary:      Effort: Pulmonary effort is normal. No respiratory distress.      Breath sounds: Normal breath sounds. No wheezing, rhonchi or rales.   Abdominal:      General: Bowel sounds are normal.      Palpations: Abdomen is soft.      Tenderness: There is no abdominal tenderness. There is no right CVA tenderness, left CVA tenderness, guarding or rebound.   Genitourinary:     Comments: Samson with clear yellow urine  Musculoskeletal:         General: No swelling. Normal range of motion.      Cervical back: Normal range of motion and neck supple.   Skin:     General: Skin is warm and dry.      Capillary Refill: Capillary refill takes less than 2 seconds.      Findings: Lesion present. No rash.      Comments: Left lower leg wound   Neurological:      General: No focal deficit present.      Mental Status: She is alert. Mental status is at baseline.      Comments: Slight tremor   Psychiatric:         Mood and Affect: Mood normal.         Behavior: Behavior normal.                  Code Status  Full Code     Assessment/Plan      Complicated CAUTI, POA  Continue patient on IV Rocephin pending blood and urine culture results  Patient unable to elaborate exactly why she has catheter in place, appears to be related to chronic incontinence and wounds  Will request wound care consultation     Hypotension unclear if related to sepsis or poor intake  Patient had good response to IV fluids in emergency department  Continue supplemental IV fluids for now  Monitor blood pressure closely  Avoid hypotensive medications-  reintroduce home meds as appropriate      Slow transit constipation  Patient appeared to have fecal impaction on diagnostic imaging  Patient was partially disimpacted from the ER report is remaining stool is soft  We will continue patient on MiraLAX     Major depressive disorder with history of psychosis  Continue home medications    Left lower leg wound  Wound care consult appreciated  Wound care recommends follow up in [facility] wound [facility] for this    DVT ppx: Lovenox       Please see orders for more complete plan    Lynne Crowell PA-C   2 liters nasal cannula/oxygen

## 2024-05-03 NOTE — CONSULTS
Nutrition Initial Assessment:   Nutrition Assessment    Reason for Assessment: Admission nursing screening    Medical history per chart:     83 y.o. 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      5/3:  Pt very fatigued, falling asleep during visit, and agreeable to lunch.  Pt agreeable to trial Ensure supplements d/t decreased appetite, and PO intake.  Pt is on  gluten free diet, and RN reports pt only avoid milk and cheese because it causes diarrhea.   Removed milk as an allergy.    Nutrition History:  Food and Nutrient History: Patient unable to report.  Very fatigued       Current Diet: Adult diet Regular       Nutrition Related Findings:   BM: Last BM Date: 05/03/24  Wound Type:  Lower leg wound  (nursing/wound notes provide further details)    Food allergies: NKFA. is allergic to gluten, hydrocodone, hydrocodone-acetaminophen, latex, penicillins, and prednisone.  Meds/Labs reviewed.  ARIPiprazole, 2.5 mg, oral, Daily  aspirin, 81 mg, oral, Daily  brimonidine, 1 drop, Both Eyes, BID  cefTRIAXone, 1 g, intravenous, q24h  divalproex sprinkle, 125 mg, oral, BID  enoxaparin, 40 mg, subcutaneous, q24h  polyethylene glycol, 17 g, oral, Daily  propranolol, 20 mg, oral, BID  timolol, 1 drop, Both Eyes, Daily  traZODone, 25 mg, oral, Nightly       lactated Ringer's, 100 mL/hr, Last Rate: 100 mL/hr (05/03/24 1001)         Nutrition Significant Labs:    Results from last 7 days   Lab Units 05/03/24  0412 05/02/24  1346   GLUCOSE mg/dL 79 89   SODIUM mmol/L 134* 131*   POTASSIUM mmol/L 3.8 4.2   CHLORIDE mmol/L 102 100   CO2 mmol/L 26 27   BUN mg/dL 21 23   CREATININE mg/dL 0.45* 0.65   EGFR mL/min/1.73m*2 >90 86   CALCIUM mg/dL 8.9 8.5*   MAGNESIUM mg/dL  --  1.76     Lab Results   Component Value Date    HGBA1C 5.0 12/08/2023        "    Anthropometrics:  Height: 162.6 cm (5' 4\")   Weight: 81.6 kg (180 lb)   BMI (Calculated): 30.88  IBW/kg (Dietitian Calculated): 54.5 kg          Weight History:   Wt Readings from Last 10 Encounters:   05/02/24 81.6 kg (180 lb)   10/26/23 83.5 kg (184 lb)   10/21/23 83.5 kg (184 lb)   10/03/23 88.5 kg (195 lb 1.7 oz)   01/26/22 88.5 kg (195 lb)   10/29/21 87.3 kg (192 lb 6 oz)   02/02/21 90 kg (198 lb 8 oz)   01/04/21 89.1 kg (196 lb 8 oz)   09/14/20 85.7 kg (189 lb)   07/06/20 87.8 kg (193 lb 8 oz)        Weight Change %:  Weight History / % Weight Change: Noted a 7.7% loss x 7 months.  Significant Weight Loss: No          Nutrition Focused Physical Exam Findings:  defer: Pt falling asleep, very fatigued   Subcutaneous Fat Loss:      Muscle Wasting:     Edema:     Physical Findings:  Skin: Positive (Lower leg wound)    Estimated Needs:      Method for Estimating Needs: 5241-4237 kcals (25-30 kcal/kg IBW)  Total Protein Estimated Needs (g): 65 g  Method for Estimating Needs: 1.2 gm/kg IBW     Method for Estimating Needs: 1ml/kcal        Nutrition Diagnosis   Nutrition Diagnosis:  Malnutrition Diagnosis  Patient has Malnutrition Diagnosis: No    Nutrition Diagnosis  Patient has Nutrition Diagnosis: Yes  Diagnosis Status (1): New  Nutrition Diagnosis 1: Predicted inadequate energy intake  Related to (1): fatigue, poor PO intake, and appetite d/t UTI  As Evidenced by (1): PO intake <75%       Nutrition Interventions/Recommendations   Nutrition Interventions and Recommendations:        Nutrition Prescription:  Individualized Nutrition Prescription Provided for : Trial supplements        Nutrition Interventions:   Food and/or Nutrient Delivery Interventions  Interventions: Medical food supplement  Medical Food Supplement: Commercial beverage  Goal: Strawberry Ensure Plus high protein BID  Additional Interventions: Jerzy BID    Coordination of Nutrition Care by a Nutrition Professional  Collaboration and Referral of " Nutrition Care: Other (Comment)  Goal: EBER Kelsey    Nutrition Education:   Education Documentation  No documentation found.      Nutrition Counseling  Counseling Theoretical Approach: Other (Comment)  Goal: Briefly reviewed supplements as pt falling asleep       Nutrition Monitoring and Evaluation   Monitoring/Evaluation:   Food/Nutrient Related History Monitoring  Monitoring and Evaluation Plan: Energy intake  Energy Intake: Estimated energy intake  Criteria: PO intake >50%  Additional Plans: Supplements >50%    Body Composition/Growth/Weight History  Monitoring and Evaluation Plan: Weight  Weight: Measured weight  Criteria: Stable weight    Biochemical Data, Medical Tests and Procedures  Monitoring and Evaluation Plan: Electrolyte/renal panel  Electrolyte and Renal Panel: BUN, Creatinine, Sodium, Potassium, Phosphorus  Criteria: WNL    Nutrition Focused Physical Findings  Monitoring and Evaluation Plan: Skin  Skin: Impaired wound healing  Criteria: Promote healing            Time Spent/Follow-up Reminder:   Follow Up  Time Spent (min): 45 minutes  Last Date of Nutrition Visit: 05/03/24  Nutrition Follow-Up Needed?: Dietitian to reassess per policy  Follow up Comment: 5/7-5/8

## 2024-05-03 NOTE — CONSULTS
Wound Care Consult     Visit Date: 5/3/2024      Patient Name: Qi Davila         MRN: [id number]           YOB: 1940     Pertinent Labs:   Albumin   Date Value Ref Range Status   05/02/2024 2.8 (L) 3.4 - 5.0 g/dL Final   Dietician on consult.   Wound Assessment:  Wound 05/02/24 Traumatic Pretibial Left (Active)   Wound Image   05/03/24 0829   Site Assessment Red;White 05/03/24 0829   Harriett-Wound Assessment Dry;Pink;Painful;Other (Comment) 05/03/24 0829   Non-staged Wound Description Full thickness 05/03/24 0829   Shape irregular,signs of healing noted surrounding wound 05/03/24 0829   Wound Length (cm) 3.5 cm 05/03/24 0829   Wound Width (cm) 2.5 cm 05/03/24 0829   Wound Surface Area (cm^2) 8.75 cm^2 05/03/24 0829   Wound Depth (cm) 0.2 cm 05/03/24 0829   Wound Volume (cm^3) 1.75 cm^3 05/03/24 0829   Margins Well-defined edges 05/03/24 0829   Drainage Description Serosanguineous 05/03/24 0829   Drainage Amount Scant 05/03/24 0829   Dressing Xeroform;Foam 05/03/24 0829   Dressing Changed Changed 05/03/24 0829   Dressing Status Clean;Dry;Occlusive 05/03/24 0829     Patient seen for left lower leg wound (present on admission) complicated by PMH: memory loss debility chronic wounds and indwelling Samson catheter hypertension 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 Per outpatient review “patient was in the ED on 2/25/24 for a 7 cm laceration on the left lower leg, It was repaired in the ED and she was discharged back to the nursing home with instructions to have the sutures removed in 12-14 days, She states she did not get the sutures removed and the wound is infected”. On 3/8/24 patient was seen by [facility] for wound check, above statement was from that outpatient note, sutures removed at that time. Patient was placed on Keflex and Bactrim at that time, imaging was done at that time, no cause of concern noted to findings. Patient alert, slightly confused during this  exam, states she has had wound for “awhile”. Full thickness wound noted to left lower leg, no obvious signs of infection. Patient currently being treated for UTI. Patient breakfast came during exam and she requested to start eating, bedside RN Laure made aware and encouraged to place preventative foams to heels and sacrum. Skin hygiene and dressing care provided. See detailed assessment above from flowsheet. Recommendations below, reviewed with Lynne Crowell.     Wound location: Left lower leg    Treatment protocol recommended:  Cleanse with NSS and pat dry.  Apply xeroform, and clean dry dressing every other day/prn.   Continue to off load, turning at least every 2 hours. Offload heels.    Therapeutic surface: Patient on Centrella standard pressure relieving mattress during exam. Staff to continue to turn/reposition patient atleast every 2 hours. Offload heels. Preventative foams.     Nursing updated, continue pressure injury preventions, nursing to follow provider orders and re-consult wound RN if needed.    See above recommendations for treatment. I would recommend follow up in [facility] upon discharge.     Please contact me with questions or changes in patient condition.  Caroline López RN  [facility]   [phone]

## 2024-05-03 NOTE — CARE PLAN
The patient's goals for the shift include  rest and safety.    The clinical goals for the shift include patient to remain safe and free from falls and injury.

## 2024-05-03 NOTE — PROGRESS NOTES
05/03/24 1210   Discharge Planning   Living Arrangements Other (Comment)  (Currently at SNF, from [place])   Support Systems Children   Assistance Needed yes   Type of Residence Assisted living   Home or Post Acute Services Post acute facilities (Rehab/SNF/etc)   Type of Post Acute Facility Services Skilled nursing   Patient expects to be discharged to: [facility] SNF   Does the patient need discharge transport arranged? Yes   RoundTrip coordination needed? Yes   Patient Choice   Patient / Family choosing to utilize agency / facility established prior to hospitalization Yes     Patient confused, call placed to daughter Paola. Introduced self and role as RN TCC. Patient originally from [facility], she has been having rehab done at [facility], daughter states patient will need to return to [facility] to complete rehab prior to her being able to return to [place]. Referral has been sent to [facility]. Patient will require a new authorization to return. PT OT are on to evaluate. Per Lynne Crowell PAC, patient likely not ready for 72 hours or more. TCC to continue to follow.

## 2024-05-03 NOTE — PROGRESS NOTES
Physical Therapy                 Therapy Communication Note    Patient Name: Qi Davila  MRN: [id number]  Today's Date: 5/3/2024     Discipline: Physical Therapy    Missed Visit Reason: Missed Visit Reason: Patient placed on medical hold (ATTEMPTED EVAL 1408- ON BED PAN; 1450 W/ DIARRHEA AND ON BED MANZO, WILL SEE FOR EVAL 5/4)    Missed Time: Attempt    Comment:   Note Text (......Xxx Chief Complaint.): This diagnosis correlates with the Render Risk Assessment In Note?: no Other (Free Text): Pt has tried and failed clobetasol\\nDiscussed Dupixent with pt\\nWill prescribe Protopic then follow up in four weeks \\nWill start process for Dupixent if no significant improvement Detail Level: Zone

## 2024-05-03 NOTE — PROGRESS NOTES
Social work consult placed for positive medical risk screen. SW reviewed pt's chart and communicated with TCC. No SW needs foreseen at this time. SW signing off; available upon request.    NINFA Pablo, LSW ([id number])   Care Transitions

## 2024-05-03 NOTE — PROGRESS NOTES
Occupational Therapy                 Therapy Communication Note    Patient Name: Qi Davila  MRN: [id number]  Today's Date: 5/3/2024     Discipline: Occupational Therapy    Missed Visit Reason: Missed Visit Reason: Patient placed on medical hold (Attempted 1408 - Pt in room with  PCA and RN on bed pan 2/2 to several episodes of diarrhea)    Missed Time: Attempt    Comment:

## 2024-05-04 LAB
ANION GAP SERPL CALC-SCNC: 8 MMOL/L (ref 10–20)
ATRIAL RATE: 68 BPM
BUN SERPL-MCNC: 11 MG/DL (ref 6–23)
CALCIUM SERPL-MCNC: 8.4 MG/DL (ref 8.6–10.3)
CHLORIDE SERPL-SCNC: 103 MMOL/L (ref 98–107)
CO2 SERPL-SCNC: 27 MMOL/L (ref 21–32)
CREAT SERPL-MCNC: 0.31 MG/DL (ref 0.5–1.05)
EGFRCR SERPLBLD CKD-EPI 2021: >90 ML/MIN/1.73M*2
ERYTHROCYTE [DISTWIDTH] IN BLOOD BY AUTOMATED COUNT: 14.1 % (ref 11.5–14.5)
GLUCOSE SERPL-MCNC: 80 MG/DL (ref 74–99)
HCT VFR BLD AUTO: 27 % (ref 36–46)
HGB BLD-MCNC: 9.3 G/DL (ref 12–16)
MAGNESIUM SERPL-MCNC: 1.6 MG/DL (ref 1.6–2.4)
MCH RBC QN AUTO: 33 PG (ref 26–34)
MCHC RBC AUTO-ENTMCNC: 34.4 G/DL (ref 32–36)
MCV RBC AUTO: 96 FL (ref 80–100)
NRBC BLD-RTO: 0 /100 WBCS (ref 0–0)
P AXIS: 26 DEGREES
PLATELET # BLD AUTO: 177 X10*3/UL (ref 150–450)
POTASSIUM SERPL-SCNC: 3.5 MMOL/L (ref 3.5–5.3)
PR INTERVAL: 166 MS
Q ONSET: 254 MS
QRS COUNT: 10 BEATS
QRS DURATION: 90 MS
QT INTERVAL: 399 MS
QTC CALCULATION(BAZETT): 418 MS
QTC FREDERICIA: 412 MS
R AXIS: 1 DEGREES
RBC # BLD AUTO: 2.82 X10*6/UL (ref 4–5.2)
SODIUM SERPL-SCNC: 134 MMOL/L (ref 136–145)
T AXIS: 0 DEGREES
T OFFSET: 453 MS
VENTRICULAR RATE: 66 BPM
WBC # BLD AUTO: 6.2 X10*3/UL (ref 4.4–11.3)

## 2024-05-04 PROCEDURE — 2500000004 HC RX 250 GENERAL PHARMACY W/ HCPCS (ALT 636 FOR OP/ED): Performed by: NURSE PRACTITIONER

## 2024-05-04 PROCEDURE — 1210000001 HC SEMI-PRIVATE ROOM DAILY

## 2024-05-04 PROCEDURE — 2500000001 HC RX 250 WO HCPCS SELF ADMINISTERED DRUGS (ALT 637 FOR MEDICARE OP): Performed by: INTERNAL MEDICINE

## 2024-05-04 PROCEDURE — 99233 SBSQ HOSP IP/OBS HIGH 50: CPT | Performed by: INTERNAL MEDICINE

## 2024-05-04 PROCEDURE — 2500000001 HC RX 250 WO HCPCS SELF ADMINISTERED DRUGS (ALT 637 FOR MEDICARE OP): Performed by: NURSE PRACTITIONER

## 2024-05-04 PROCEDURE — 97166 OT EVAL MOD COMPLEX 45 MIN: CPT | Mod: GO

## 2024-05-04 PROCEDURE — 97162 PT EVAL MOD COMPLEX 30 MIN: CPT | Mod: GP

## 2024-05-04 PROCEDURE — 2500000004 HC RX 250 GENERAL PHARMACY W/ HCPCS (ALT 636 FOR OP/ED): Performed by: INTERNAL MEDICINE

## 2024-05-04 PROCEDURE — 85027 COMPLETE CBC AUTOMATED: CPT | Performed by: PHYSICIAN ASSISTANT

## 2024-05-04 PROCEDURE — 82374 ASSAY BLOOD CARBON DIOXIDE: CPT | Performed by: PHYSICIAN ASSISTANT

## 2024-05-04 PROCEDURE — 83735 ASSAY OF MAGNESIUM: CPT | Performed by: PHYSICIAN ASSISTANT

## 2024-05-04 PROCEDURE — 2500000001 HC RX 250 WO HCPCS SELF ADMINISTERED DRUGS (ALT 637 FOR MEDICARE OP): Performed by: PHYSICIAN ASSISTANT

## 2024-05-04 PROCEDURE — 36415 COLL VENOUS BLD VENIPUNCTURE: CPT | Performed by: PHYSICIAN ASSISTANT

## 2024-05-04 RX ORDER — ALUMINUM HYDROXIDE, MAGNESIUM HYDROXIDE, AND SIMETHICONE 1200; 120; 1200 MG/30ML; MG/30ML; MG/30ML
30 SUSPENSION ORAL 4 TIMES DAILY PRN
Status: DISCONTINUED | OUTPATIENT
Start: 2024-05-04 | End: 2024-05-09 | Stop reason: HOSPADM

## 2024-05-04 RX ORDER — ONDANSETRON HYDROCHLORIDE 2 MG/ML
4 INJECTION, SOLUTION INTRAVENOUS EVERY 6 HOURS PRN
Status: DISCONTINUED | OUTPATIENT
Start: 2024-05-04 | End: 2024-05-09 | Stop reason: HOSPADM

## 2024-05-04 RX ADMIN — PROPRANOLOL HYDROCHLORIDE 20 MG: 10 TABLET ORAL at 09:28

## 2024-05-04 RX ADMIN — ONDANSETRON 4 MG: 2 INJECTION INTRAMUSCULAR; INTRAVENOUS at 20:13

## 2024-05-04 RX ADMIN — DIVALPROEX SODIUM 125 MG: 125 CAPSULE, COATED PELLETS ORAL at 20:14

## 2024-05-04 RX ADMIN — BRIMONIDINE TARTRATE 1 DROP: 2 SOLUTION/ DROPS OPHTHALMIC at 09:44

## 2024-05-04 RX ADMIN — ENOXAPARIN SODIUM 40 MG: 40 INJECTION SUBCUTANEOUS at 20:14

## 2024-05-04 RX ADMIN — PROPRANOLOL HYDROCHLORIDE 20 MG: 10 TABLET ORAL at 20:14

## 2024-05-04 RX ADMIN — TRAZODONE HYDROCHLORIDE 25 MG: 50 TABLET ORAL at 20:14

## 2024-05-04 RX ADMIN — TIMOLOL MALEATE 1 DROP: 5 SOLUTION/ DROPS OPHTHALMIC at 09:44

## 2024-05-04 RX ADMIN — POLYETHYLENE GLYCOL 3350 17 G: 17 POWDER, FOR SOLUTION ORAL at 09:27

## 2024-05-04 RX ADMIN — DIVALPROEX SODIUM 125 MG: 125 CAPSULE, COATED PELLETS ORAL at 09:28

## 2024-05-04 RX ADMIN — ALUMINUM HYDROXIDE, MAGNESIUM HYDROXIDE, AND DIMETHICONE 30 ML: 200; 20; 200 SUSPENSION ORAL at 20:13

## 2024-05-04 RX ADMIN — SODIUM CHLORIDE, POTASSIUM CHLORIDE, SODIUM LACTATE AND CALCIUM CHLORIDE 100 ML/HR: 600; 310; 30; 20 INJECTION, SOLUTION INTRAVENOUS at 03:27

## 2024-05-04 RX ADMIN — CEFTRIAXONE SODIUM 1 G: 1 INJECTION, SOLUTION INTRAVENOUS at 17:13

## 2024-05-04 RX ADMIN — ASPIRIN 81 MG CHEWABLE TABLET 81 MG: 81 TABLET CHEWABLE at 09:28

## 2024-05-04 RX ADMIN — BRIMONIDINE TARTRATE 1 DROP: 2 SOLUTION/ DROPS OPHTHALMIC at 21:22

## 2024-05-04 RX ADMIN — ARIPIPRAZOLE 2.5 MG: 5 TABLET ORAL at 09:28

## 2024-05-04 ASSESSMENT — COGNITIVE AND FUNCTIONAL STATUS - GENERAL
TURNING FROM BACK TO SIDE WHILE IN FLAT BAD: TOTAL
DAILY ACTIVITIY SCORE: 12
HELP NEEDED FOR BATHING: A LOT
MOVING TO AND FROM BED TO CHAIR: TOTAL
STANDING UP FROM CHAIR USING ARMS: TOTAL
MOVING FROM LYING ON BACK TO SITTING ON SIDE OF FLAT BED WITH BEDRAILS: A LOT
EATING MEALS: A LITTLE
CLIMB 3 TO 5 STEPS WITH RAILING: TOTAL
STANDING UP FROM CHAIR USING ARMS: TOTAL
PERSONAL GROOMING: A LITTLE
PERSONAL GROOMING: A LITTLE
DRESSING REGULAR LOWER BODY CLOTHING: TOTAL
MOBILITY SCORE: 8
MOVING TO AND FROM BED TO CHAIR: TOTAL
WALKING IN HOSPITAL ROOM: TOTAL
DRESSING REGULAR LOWER BODY CLOTHING: TOTAL
WALKING IN HOSPITAL ROOM: TOTAL
DRESSING REGULAR LOWER BODY CLOTHING: A LOT
DRESSING REGULAR UPPER BODY CLOTHING: A LOT
TOILETING: TOTAL
STANDING UP FROM CHAIR USING ARMS: TOTAL
MOVING TO AND FROM BED TO CHAIR: TOTAL
TURNING FROM BACK TO SIDE WHILE IN FLAT BAD: A LOT
DAILY ACTIVITIY SCORE: 12
CLIMB 3 TO 5 STEPS WITH RAILING: TOTAL
TOILETING: TOTAL
TOILETING: TOTAL
EATING MEALS: A LITTLE
DRESSING REGULAR UPPER BODY CLOTHING: A LOT
TOILETING: TOTAL
WALKING IN HOSPITAL ROOM: TOTAL
MOVING FROM LYING ON BACK TO SITTING ON SIDE OF FLAT BED WITH BEDRAILS: A LOT
DRESSING REGULAR UPPER BODY CLOTHING: A LOT
MOVING FROM LYING ON BACK TO SITTING ON SIDE OF FLAT BED WITH BEDRAILS: A LOT
HELP NEEDED FOR BATHING: A LOT
HELP NEEDED FOR BATHING: A LOT
STANDING UP FROM CHAIR USING ARMS: TOTAL
DRESSING REGULAR UPPER BODY CLOTHING: A LOT
WALKING IN HOSPITAL ROOM: TOTAL
MOVING FROM LYING ON BACK TO SITTING ON SIDE OF FLAT BED WITH BEDRAILS: A LOT
DAILY ACTIVITIY SCORE: 12
DAILY ACTIVITIY SCORE: 12
DRESSING REGULAR LOWER BODY CLOTHING: TOTAL
MOBILITY SCORE: 7
HELP NEEDED FOR BATHING: A LOT
TURNING FROM BACK TO SIDE WHILE IN FLAT BAD: A LOT
CLIMB 3 TO 5 STEPS WITH RAILING: TOTAL
EATING MEALS: A LITTLE
MOBILITY SCORE: 8
CLIMB 3 TO 5 STEPS WITH RAILING: TOTAL
PERSONAL GROOMING: A LOT
PERSONAL GROOMING: A LITTLE
EATING MEALS: A LITTLE
MOBILITY SCORE: 8
MOVING TO AND FROM BED TO CHAIR: TOTAL
TURNING FROM BACK TO SIDE WHILE IN FLAT BAD: A LOT

## 2024-05-04 ASSESSMENT — ENCOUNTER SYMPTOMS
HEADACHES: 0
BLOOD IN STOOL: 0
BACK PAIN: 0
HALLUCINATIONS: 0
APPETITE CHANGE: 0
WHEEZING: 0
COUGH: 0
CONSTIPATION: 0
BRUISES/BLEEDS EASILY: 0
FEVER: 0
TROUBLE SWALLOWING: 0
NUMBNESS: 0
EYE PAIN: 0
SHORTNESS OF BREATH: 0
PALPITATIONS: 0
DIAPHORESIS: 0
HEMATURIA: 0
FACIAL SWELLING: 0
DIZZINESS: 0
CHILLS: 0
WEAKNESS: 0
SORE THROAT: 0
WOUND: 0
FREQUENCY: 0
LIGHT-HEADEDNESS: 0
CHEST TIGHTNESS: 0
FLANK PAIN: 0
VOMITING: 0
ABDOMINAL PAIN: 0
NAUSEA: 0
JOINT SWELLING: 0
DYSURIA: 0
FATIGUE: 0
DIARRHEA: 0

## 2024-05-04 ASSESSMENT — PAIN - FUNCTIONAL ASSESSMENT
PAIN_FUNCTIONAL_ASSESSMENT: 0-10

## 2024-05-04 ASSESSMENT — ACTIVITIES OF DAILY LIVING (ADL): BATHING_ASSISTANCE: MAXIMAL

## 2024-05-04 ASSESSMENT — PAIN SCALES - GENERAL
PAINLEVEL_OUTOF10: 0 - NO PAIN

## 2024-05-04 NOTE — CARE PLAN
The patient's goals for the shift include      The clinical goals for the shift include maintain patient safety  Problem: Safety - Adult  Goal: Free from fall injury  Outcome: Progressing     Problem: Discharge Planning  Goal: Discharge to home or other facility with appropriate resources  Outcome: Progressing     Problem: Chronic Conditions and Co-morbidities  Goal: Patient's chronic conditions and co-morbidity symptoms are monitored and maintained or improved  Outcome: Progressing     Problem: Skin  Goal: Decreased wound size/increased tissue granulation at next dressing change  Outcome: Progressing  Flowsheets (Taken 5/4/2024 1253)  Decreased wound size/increased tissue granulation at next dressing change: Promote sleep for wound healing  Goal: Participates in plan/prevention/treatment measures  Outcome: Progressing  Flowsheets (Taken 5/4/2024 1253)  Participates in plan/prevention/treatment measures: Elevate heels  Goal: Prevent/manage excess moisture  Outcome: Progressing  Flowsheets (Taken 5/4/2024 1253)  Prevent/manage excess moisture: Monitor for/manage infection if present  Goal: Prevent/minimize sheer/friction injuries  Outcome: Progressing  Flowsheets (Taken 5/4/2024 1253)  Prevent/minimize sheer/friction injuries:   Use pull sheet   Increase activity/out of bed for meals  Goal: Promote/optimize nutrition  Outcome: Progressing  Flowsheets (Taken 5/4/2024 1253)  Promote/optimize nutrition: Consume > 50% meals/supplements  Goal: Promote skin healing  Outcome: Progressing  Flowsheets (Taken 5/4/2024 1253)  Promote skin healing:   Turn/reposition every 2 hours/use positioning/transfer devices   Protective dressings over bony prominences     Problem: Skin  Goal: Participates in plan/prevention/treatment measures  Outcome: Progressing  Flowsheets (Taken 5/4/2024 1253)  Participates in plan/prevention/treatment measures: Elevate heels     Problem: Nutrition  Goal: Oral intake greater 75%  Outcome:  Progressing  Goal: Consume prescribed supplement  Outcome: Progressing  Goal: Promote healing  Outcome: Progressing     Problem: Fall/Injury  Goal: Not fall by end of shift  Outcome: Progressing  Goal: Be free from injury by end of the shift  Outcome: Progressing  Goal: Verbalize understanding of personal risk factors for fall in the hospital  Outcome: Progressing  Goal: Verbalize understanding of risk factor reduction measures to prevent injury from fall in the home  Outcome: Progressing  Goal: Use assistive devices by end of the shift  Outcome: Progressing  Goal: Pace activities to prevent fatigue by end of the shift  Outcome: Progressing

## 2024-05-04 NOTE — CARE PLAN
Problem: Safety - Adult  Goal: Free from fall injury  Outcome: Progressing     Problem: Discharge Planning  Goal: Discharge to home or other facility with appropriate resources  Outcome: Progressing     Problem: Chronic Conditions and Co-morbidities  Goal: Patient's chronic conditions and co-morbidity symptoms are monitored and maintained or improved  Outcome: Progressing     Problem: Skin  Goal: Decreased wound size/increased tissue granulation at next dressing change  Outcome: Progressing  Goal: Participates in plan/prevention/treatment measures  Outcome: Progressing  Goal: Prevent/manage excess moisture  Outcome: Progressing  Goal: Prevent/minimize sheer/friction injuries  Outcome: Progressing  Goal: Promote/optimize nutrition  Outcome: Progressing  Goal: Promote skin healing  Outcome: Progressing     Problem: Nutrition  Goal: Oral intake greater 75%  Outcome: Progressing  Goal: Consume prescribed supplement  Outcome: Progressing  Goal: Promote healing  Outcome: Progressing

## 2024-05-04 NOTE — PROGRESS NOTES
Physical Therapy    Physical Therapy Evaluation    Patient Name: Qi Davila  MRN: [id number]  Today's Date: 5/4/2024   Time Calculation  Start Time: 1006  Stop Time: 1022  Time Calculation (min): 16 min    Assessment/Plan   PT Assessment  PT Assessment Results: Decreased strength, Decreased range of motion, Decreased endurance, Impaired balance, Decreased mobility, Decreased coordination, Decreased cognition, Decreased safety awareness, Impaired hearing, Obesity, Decreased skin integrity  Rehab Prognosis: Fair  Evaluation/Treatment Tolerance: Patient limited by fatigue  Medical Staff Made Aware: Yes  End of Session Communication: Bedside nurse  Assessment Comment: Pt required mod to max A x1-2 for all bed mobility activities and was unable to stand at eval. She reports being ablee to use wc and transfer to wc prior to hospitalization and appears to be functioning below baseline. would benefit from PT services to address the identified impairments in order to facilitate safe discharge planning  End of Session Patient Position: Bed, 3 rail up, Alarm on  IP OR SWING BED PT PLAN  Inpatient or Swing Bed: Inpatient  PT Plan  Treatment/Interventions: Bed mobility, Transfer training, Balance training, Strengthening, Endurance training, Range of motion, Therapeutic exercise, Therapeutic activity, Positioning, Wheelchair management  PT Plan: Skilled PT  PT Frequency: 3 times per week  PT Discharge Recommendations: Moderate intensity level of continued care  PT Recommended Transfer Status: Total assist, Assist x2  PT - OK to Discharge: Yes      Subjective   General Visit Information:  General  Reason for Referral: impaired mobility  Referred By: jefferson  Past Medical History Relevant to Rehab: s/f memory loss debility chronic wounds and indwelling Samson catheter hypertension 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 [place], reluctant to participate in eval. Admitted for hypotension and UTI. Oriented x2, questionable historian. Per chart pt was at [facility] for Rehab prior to hospitalization - pt reports poor experience at SNF  Home Living:  Home Living  Type of Home: Assisted living  Lives With: Other (Comment) (facility staff)  Home Adaptive Equipment: Wheelchair-manual  Home Layout: One level  Home Living Comments: Pt is a questionable historian, unable to accurately relay PLOF. no family present to confer with. Per chart pt is from [facility]. Pt reports she is from [facility] but \"has not been there for awhile\". Unable to report how much assistance she needed from staff for mobility or ADLs at baseline. chart reports pt was ambulating with cane and driving 10/2023. However pt reports she has not walked in >1 yr. uses  at baseline. Could not tell therapist how she was transferring to   Prior Level of Function:  Prior Function Per Pt/Caregiver Report  Prior Function Comments: unknown - has been at [facility] recently per chart and needed some level of assistance for transfers/mobility. Non ambulatory  Precautions:  Precautions  Medical Precautions: Fall precautions  Precautions Comment: memory loss, chronic wounds, de [place] garza  Vital Signs:       Objective   Pain:  Pain Assessment  Pain Assessment: 0-10  Pain Score: 0 - No pain  Cognition:  Cognition  Orientation Level: Disoriented to time  Safety/Judgement: Exceptions to WFL  Insight: Moderate  Flexibility of Thought: Reduced flexibility    General Assessments:                  Activity Tolerance  Endurance: Tolerates less than 10 min exercise, no significant change in vital signs    Sensation  Light Touch: No apparent deficits    Strength  Strength Comments: BLE edema, L knee edema worse than R. " Jhoan knee AROM limited, grossly -10-70 degrees. Jhoan hip AROM limited due to weakness, <75% of WFL AROM. BLEstrength grossly 2 to 3-/5 in available ROM  Strength  Strength Comments: BLE edema, L knee edema worse than R. Jhoan knee AROM limited, grossly -10-70 degrees. Jhoan hip AROM limited due to weakness, <75% of WFL AROM. BLEstrength grossly 2 to 3-/5 in available ROM           Coordination  Movements are Fluid and Coordinated: No  Coordination Comment: delayed initiation of BLE movements         Static Sitting Balance  Static Sitting-Balance Support: Feet supported  Static Sitting-Level of Assistance: Close supervision  Static Sitting-Comment/Number of Minutes: F+  Dynamic Sitting Balance  Dynamic Sitting-Balance Support: Feet supported  Dynamic Sitting-Balance: Trunk control activities  Dynamic Sitting-Comments: F    Static Standing Balance  Static Standing-Comment/Number of Minutes: unable  Dynamic Standing Balance  Dynamic Standing-Comments: unable  Functional Assessments:       Bed Mobility  Bed Mobility: Yes  Bed Mobility 1  Bed Mobility 1: Supine to sitting  Level of Assistance 1: Maximum assistance  Bed Mobility 2  Bed Mobility  2: Sitting to supine  Level of Assistance 2: Dependent (Max A x2)  Bed Mobility Comments 2: Rolling side to side - mod A x1    Transfers  Transfer: No (TD/Unable)    Ambulation/Gait Training  Ambulation/Gait Training Performed: No (did not appear to be ambulatory prior to hospital stay; however pt is a questionable historian)    Stairs  Stairs: No       Extremity/Trunk Assessments:     Outcome Measures:  [facility] Basic Mobility  Turning from your back to your side while in a flat bed without using bedrails: A lot  Moving from lying on your back to sitting on the side of a flat bed without using bedrails: A lot  Moving to and from bed to chair (including a wheelchair): Total  Standing up from a chair using your arms (e.g. wheelchair or bedside chair): Total  To walk in hospital room:  Total  Climbing 3-5 steps with railing: Total  Basic Mobility - Total Score: 8    Encounter Problems       Encounter Problems (Active)       PT Transfers       STG - Transfer from bed to chair with max A x1 using LRD (Not Progressing)       Start:  05/04/24    Expected End:  05/18/24            STG - Patient will perform bed mobility with min a x1 (Progressing)       Start:  05/04/24    Expected End:  05/18/24            STG - Patient will transfer sit to and from stand using LRD with mod A x1 (Not Progressing)       Start:  05/04/24    Expected End:  05/18/24                   Education Documentation  Mobility Training, taught by Chani Welch, PT at 5/4/2024 11:47 AM.  Learner: Patient  Readiness: Acceptance  Method: Explanation, Demonstration  Response: Needs Reinforcement  Comment: Education provided on benefits of early mobility and daily mobility to prevent risks associated wwith prolonged bedrest. Pt educated on safe sequencing of upper and lower body movements to facilitate greater participation in bed mobility tasks    Education Comments  No comments found.

## 2024-05-04 NOTE — PROGRESS NOTES
Occupational Therapy  Evaluation    Patient Name: Qi Davila  MRN: [id number]  Today's Date: 5/4/2024  Time Calculation  Start Time: 1005  Stop Time: 1021  Time Calculation (min): 16 min    Current Problem:   1. Urinary tract infection without hematuria, site unspecified    2. Hypotension, unspecified hypotension type    3. Constipation, unspecified constipation type        OT order: OT eval and treat   Referred by: TREASURE Acosta-CNP  Reason for referral: ADLs and Safety Assess  Past medical history related to rehab:   Past Medical History:   Diagnosis Date    Disease of thyroid gland     Hypertension     Personal history of other mental and behavioral disorders     History of psychiatric hospitalization    Personal history of other specified conditions 01/10/2022    History of insomnia      Past Surgical History:   Procedure Laterality Date    MR HEAD ANGIO WO IV CONTRAST  1/5/2012    MR HEAD ANGIO WO IV CONTRAST 1/5/2012 [facility] CLINICAL [facility]GAC[facility]         Pt admitted 5/2 with hypotension and UTI    Orders received, chart reviewed, eval complete    Precautions:   Medical Precautions: Fall precautions (de la garza catheter,cognition, skin integrity,)    ASSESSMENT  OT Assessment: Pt demonstrated decreased activity tolerance, balance and safety awareness which impacted safe ADL/IADL completion. Pt is not at their baseline functional participation at this time. Pt would benefit from continued skills occupational therapy services during hospitalization in order to promote independence in daily routines for safe discharge.  Pt with Decreased ADL status, Decreased upper extremity strength, Decreased safe judgment during ADL, Decreased endurance, Decreased functional mobility, Decreased cognition  Prognosis: Fair  Tolerance:  (limited by cognition)    PLAN  Frequency: 3 times per week  Treatment Interventions: ADL retraining, Functional transfer training, UE strengthening/ROM, Endurance training, Patient/family training,  "Equipment evaluation/education, Cognitive reorientation  Discharge Recommendations: Low intensity level of continued care, Moderate intensity level of continued care  OT OK to discharge: Yes    GENERAL VISIT INFORMATION   Start of session communication: Bedside nurse  End of session communication: Bedside nurse  Family/caregiver present: No  Caregiver feedback: n/a  Co-Treatment: PT  Reason for co-treatment: Co-eval and treat completed in order to optimize patient safety and function in order to promote OT goal achievement   Position Pt Received:  Alarm on, Bed, 2 rail up  End of session position: Alarm on, Bed, 2 rail up    SUBJECTIVE  Home Living:  Type of Home:  (Pt is a poor historian, states she is from [facility] and then that she hasno been there in a long time because she was at \"another place to get therapy and meds\" Pt becomes agitated with increased home set up/PLOF questions)     Prior Level of Function:  Level of [place]:  (unknown level of assist - Pt agitated with questions and with trouble recalling information. Per RN Pt is wc level. Pt reports she has not walked in over a year)    Pain:  Assessment: 0-10  Score: 0 - No pain      OBJECTIVE    Cognition:  Overall Cognitive Status: impaired   Orientation Level: Disoriented to time (and confusion regarding PLOF)             Current ADL function:   EATING:   (supervision - spillage verbal cues for safety; A for positioning. Pt ate partial breakfast setaed EOB and then assisted to long sitting in bed for remainder of tray items)     GROOMING: Minimal (simulated EOB)     BATHING: Maximal (simulated)     UB DRESSING: Moderate (gown EOB)     LB DRESSING: Total (socks)     TOILETING: Total (de la garza)      Activity Tolerance:  Endurance: Tolerates less than 10 min exercise, no significant change in vital signs    Bed Mobility/Transfers:   Bed Mobility  Bed Mobility: Yes  Bed Mobility 1  Bed Mobility 1: Supine to sitting  Level of Assistance 1: Maximum " assistance  Bed Mobility 2  Bed Mobility  2: Sitting to supine  Level of Assistance 2: Dependent (max x2)  Bed Mobility Comments 2: mod A to roll R/L in bed  Transfers  Transfer: No    Ambulation/Gait Training:  Functional Mobility  Functional Mobility Performed: No    Sitting Balance:  Static Sitting Balance  Static Sitting-Balance Support:  (SBA)  Dynamic Sitting Balance  Dynamic Sitting-Balance Support:  (SBA)    Vision: Vision - Basic Assessment  Current Vision: No visual deficits    Sensation:  Light Touch: No apparent deficits    Strength:  Strength Comments: 3-/5 BUE    Perception:  Inattention/Neglect: Appears intact    Coordination:  Movements are Fluid and Coordinated: Yes (BUE for eating items from breakfast tray, able to cut omlet with side of fork. Needs A to open butter packages)     Hand Function:  Hand Function  Gross Grasp: Functional    Extremities: RUE   RUE : Within Functional Limits and LUE   LUE: Within Functional Limits    Outcome Measures: [facility] Daily Activity    Putting on and taking off regular lower body clothing: Total   Bathing (including washing, rinsing, drying): A lot   Putting on and taking off regular upper body clothing: A lot   Toileting, which includes using toilet, bedpan or urinal: Total   Taking care of personal grooming such as brushing teeth: A little   Eating Meals: A little  Daily Activity - Total Score: 12    Therapeutic Intervention   Pt edu on role of OT in the acute care setting. Pt verbalized understanding   Pt edu on the importance of completing ADLs as independently as possible while in the hospital in order to promote activity tolerance, strength, and return to PLOF. Pt verbalized understanding.   Pt edu to use call light and not attempt any functional mobility until staff present. Pt verbalized understanding.   Pt completed above Adls with assist noted including instructions and cuing to promote goal achievement and return to PLOF.     EDUCATION:     Education  Documentation  Body Mechanics, taught by Marichuy Sabillon OT at 5/4/2024 12:23 PM.  Learner: Patient  Readiness: Nonacceptance  Method: Explanation  Response: Needs Reinforcement    Precautions, taught by Marichuy Sabillon OT at 5/4/2024 12:23 PM.  Learner: Patient  Readiness: Nonacceptance  Method: Explanation  Response: Needs Reinforcement    ADL Training, taught by Marichuy Sabillon OT at 5/4/2024 12:23 PM.  Learner: Patient  Readiness: Nonacceptance  Method: Explanation  Response: Needs Reinforcement    Education Comments  No comments found.        Goals:   Encounter Problems       Encounter Problems (Active)       ADLs       Patient will perform UB and LB bathing with moderate assist level of assistance and PRN DME/AE. (Progressing)       Start:  05/04/24    Expected End:  05/17/24            Patient with complete upper body dressing with set-up level of assistance donning and doffing all UE clothes with PRN adaptive equipment (Progressing)       Start:  05/04/24    Expected End:  05/17/24            Patient with complete lower body dressing with moderate assist level of assistance donning and doffing all LE clothes  with PRN adaptive equipment (Progressing)       Start:  05/04/24    Expected End:  05/17/24            Patient will feed self with modified independent level of assistance and  using PRN adaptive equipment. (Progressing)       Start:  05/04/24    Expected End:  05/17/24            Patient will complete daily grooming tasks brushing teeth and washing face/hair with set-up level of assistance and PRN adaptive equipment. (Progressing)       Start:  05/04/24    Expected End:  05/17/24            Patient will complete toileting including hygiene clothing management/hygiene with moderate assist level of assistance and PRN DME/AE. (Progressing)       Start:  05/04/24    Expected End:  05/17/24 05/04/24 at 12:23 PM - MARICHUY SABILLON, OT

## 2024-05-04 NOTE — CARE PLAN
The patient's goals for the shift include  Rest    The clinical goals for the shift include free from falls and injury during shift.    Over the shift, the patient did not make progress toward the following goals. Barriers to progression include current illness and mental capacity. Recommendations to address these barriers include frequent reinforcement of teachings.

## 2024-05-04 NOTE — PROGRESS NOTES
PT OT notes are in, patient will require Auth to Return Skilled to [facility]. Asked A [facility]  [facility] Team to start precert today.

## 2024-05-04 NOTE — PROGRESS NOTES
Qi Davila is a 83 y.o. female on day 1 of admission presenting with Complicated UTI (urinary tract infection).      Subjective   Qi Davila is a 83 y.o. 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  The patient was sent to the emergency department due to reports of low blood pressure and urinary tract infection patient had been more fatigued at her long-term care facility.  She does have some chronic memory loss but denies abdominal pain fevers chills nausea she states she has not been eating very well. Patient was also hypotensive reports of blood pressure in the 80s over 40s subsequently given IV fluids with improvement to 90s over 40s and now in the low 100s. CT scan of abdomen and pelvis was negative for any obstruction there were findings of increased stool in the rectal vault CT of the head was without acute findings chest x-ray also negative for any infiltrate effusion or pulmonary edema.  EKG shows sinus rhythm at a rate of 66 bpm no ST change elevation nonischemic EKG, normal intervals. Lactate negative. Manual disimpaction in ED @1705- report is remaining stool is soft.     5/3/2024: No acute events overnight. Vitals stable, no further hypotension, /81 this morning. Labs largely unremarkable.  No leukocytosis. Does have some anemia, suspect element of hemodilution. She denies any acute complaints,s he is actually oriented x2-2.5  5/4: Patient was seen and examined.  She is awake and alert and oriented x 3.  Blood pressure remained stable.  PT/OT yet to see.  Blood cultures are negative.  Urine cultures are pending.  Continue to trend CBC and BMP daily.       Review of Systems   Constitutional:  Negative for appetite change, chills, diaphoresis, fatigue and fever.   HENT:  Negative for congestion, ear pain, facial  swelling, hearing loss, nosebleeds, sore throat, tinnitus and trouble swallowing.    Eyes:  Negative for pain.   Respiratory:  Negative for cough, chest tightness, shortness of breath and wheezing.    Cardiovascular:  Negative for chest pain, palpitations and leg swelling.   Gastrointestinal:  Negative for abdominal pain, blood in stool, constipation, diarrhea, nausea and vomiting.   Genitourinary:  Negative for dysuria, flank pain, frequency, hematuria and urgency.   Musculoskeletal:  Negative for back pain and joint swelling.   Skin:  Negative for rash and wound.   Neurological:  Negative for dizziness, syncope, weakness, light-headedness, numbness and headaches.   Hematological:  Does not bruise/bleed easily.   Psychiatric/Behavioral:  Negative for behavioral problems, hallucinations and suicidal ideas.           Objective     Last Recorded Vitals  /79 (BP Location: Right arm, Patient Position: Lying)   Pulse 74   Temp 37.1 °C (98.8 °F) (Temporal)   Resp 16   Wt 81.6 kg (180 lb)   SpO2 95%     Image Results  CT abdomen pelvis w IV contrast    Result Date: 5/2/2024  STUDY: CT Abdomen and Pelvis with IV Contrast; 05/02/2024, 3:59 PM. INDICATION: Generalized abdominal pain. COMPARISON: CT AP: 09/28/23. ACCESSION NUMBER(S): [id number] ORDERING CLINICIAN: CELENA JONAS TECHNIQUE: CT of the abdomen and pelvis was performed.  Contiguous axial images were obtained at 3 mm slice thickness through the abdomen and pelvis. Coronal and sagittal reconstructions at 3 mm slice thickness were performed.  Omnipaque 350 75 mL was administered intravenously.  FINDINGS: LOWER CHEST: No cardiomegaly.  No pericardial effusion.  Linear bibasilar areas of atelectasis  ABDOMEN:  LIVER: No hepatomegaly.  Smooth surface contour.  Mild fatty infiltration of the liver.  Stable small hepatic cysts  BILE DUCTS: No intrahepatic or extrahepatic biliary ductal dilatation.  GALLBLADDER: The gallbladder is present without  gallstones. STOMACH: Moderate size hiatal hernia.  PANCREAS: Pancreatic parenchymal calcifications  SPLEEN: No splenomegaly or focal splenic lesion.  ADRENAL GLANDS: No thickening or nodules.  KIDNEYS AND URETERS: Kidneys are normal in size and location.  No renal or ureteral calculi.  PELVIS:  BLADDER: No abnormalities identified.  REPRODUCTIVE ORGANS: No abnormalities identified.  BOWEL: Appendix is normal.  Large amount of formed stool in the rectal vault measuring up to 8.3 cm in diameter with mild perirectal fat stranding.  Moderate stool throughout the remainder of the colon.  VESSELS: No abnormalities identified.  Abdominal aorta is normal in caliber. Moderate to severe plaque along the distal aorta.  PERITONEUM/RETROPERITONEUM/LYMPH NODES: No free fluid.  No pneumoperitoneum. No lymphadenopathy.  ABDOMINAL WALL: No abnormalities identified. SOFT TISSUES: No abnormalities identified.  BONES: No acute fracture or aggressive osseous lesion.  Bilateral total hip arthroplasty.  Scoliosis and degenerative change of the lumbar spine.    1. Large amount of formed stool in the rectal vault measuring up to 8.3 cm in diameter with mild perirectal fat stranding. Moderate stool throughout the remainder of the colon. Findings suggestive of fecal impaction with mild associated stercoral proctitis. 2. Mild hepatic steatosis. 3. Moderate size hiatal hernia. 4. Findings of chronic pancreatitis. Signed by Frankie Galindo MD    CT head wo IV contrast    Result Date: 5/2/2024  Interpreted By:  Joesph Ramírez, STUDY: CT HEAD WO IV CONTRAST;  5/2/2024 3:55 pm   INDICATION: Altered mental status.   COMPARISON: None.   ACCESSION NUMBER(S): [id number]   ORDERING CLINICIAN: CELENA JONAS   TECHNIQUE: Noncontrast axial CT scan of head was performed.   FINDINGS: Parenchyma: There is no intracranial hemorrhage. The grey-white differentiation is intact. There is no mass effect or midline shift. Patchy supratentorial hypodensity,  nonspecific, but likely secondary to mild chronic microvascular ischemia.   CSF Spaces: Mild generalized volume loss with concordant ventriculomegaly.   Extra-Axial Fluid: There is no extraaxial fluid collection.   Calvarium: The calvarium is unremarkable.   Paranasal sinuses: Visualized paranasal sinuses are clear.   Mastoids: Clear.   Orbits: Normal.   Soft tissues: Unremarkable.       No acute intracranial hemorrhage, mass effect, or CT apparent acute infarct. Chronic microvascular ischemic and involutional changes.   Signed by: Joesph Ramírez 5/2/2024 4:13 PM Dictation workstation:   [id number]    XR chest 2 views    Result Date: 5/2/2024  STUDY: Chest Radiographs;  5/2/2024 at 14:26 INDICATION: Lethargy, cough. COMPARISON: XR Chest 10/2/23. ACCESSION NUMBER(S): [id number] ORDERING CLINICIAN: CELENA JONAS TECHNIQUE:  Frontal and lateral chest. FINDINGS: CARDIOMEDIASTINAL SILHOUETTE: Cardiomediastinal silhouette is normal in size and configuration.  LUNGS: There is elevation the left hemidiaphragm.  ABDOMEN: No remarkable upper abdominal findings.  BONES: There are degenerative change with joint space loss osteophytes and loose bodies involving both shoulders.    No evidence consolidating infiltrate or effusion. Signed by Christopher Villalta MD    ECG 12 lead    Result Date: 5/2/2024  Sinus rhythm Low voltage, precordial leads       Lab Results  Results for orders placed or performed during the hospital encounter of 05/02/24 (from the past 24 hour(s))   CBC   Result Value Ref Range    WBC 6.2 4.4 - 11.3 x10*3/uL    nRBC 0.0 0.0 - 0.0 /100 WBCs    RBC 2.82 (L) 4.00 - 5.20 x10*6/uL    Hemoglobin 9.3 (L) 12.0 - 16.0 g/dL    Hematocrit 27.0 (L) 36.0 - 46.0 %    MCV 96 80 - 100 fL    MCH 33.0 26.0 - 34.0 pg    MCHC 34.4 32.0 - 36.0 g/dL    RDW 14.1 11.5 - 14.5 %    Platelets 177 150 - 450 x10*3/uL   Basic Metabolic Panel   Result Value Ref Range    Glucose 80 74 - 99 mg/dL    Sodium 134 (L) 136 - 145 mmol/L     Potassium 3.5 3.5 - 5.3 mmol/L    Chloride 103 98 - 107 mmol/L    Bicarbonate 27 21 - 32 mmol/L    Anion Gap 8 (L) 10 - 20 mmol/L    Urea Nitrogen 11 6 - 23 mg/dL    Creatinine 0.31 (L) 0.50 - 1.05 mg/dL    eGFR >90 >60 mL/min/1.73m*2    Calcium 8.4 (L) 8.6 - 10.3 mg/dL   Magnesium   Result Value Ref Range    Magnesium 1.60 1.60 - 2.40 mg/dL        Medications  Scheduled medications:  ARIPiprazole, 2.5 mg, oral, Daily  aspirin, 81 mg, oral, Daily  brimonidine, 1 drop, Both Eyes, BID  cefTRIAXone, 1 g, intravenous, q24h  divalproex sprinkle, 125 mg, oral, BID  enoxaparin, 40 mg, subcutaneous, q24h  polyethylene glycol, 17 g, oral, Daily  propranolol, 20 mg, oral, BID  timolol, 1 drop, Both Eyes, Daily  traZODone, 25 mg, oral, Nightly      Continuous medications:  lactated Ringer's, 100 mL/hr, Last Rate: 100 mL/hr (05/04/24 0656)      PRN medications:       Physical Exam  Constitutional:       General: She is not in acute distress.     Appearance: Normal appearance.      Comments: Elderly female, siting upright in bed   HENT:      Head: Normocephalic and atraumatic.      Right Ear: External ear normal.      Left Ear: External ear normal.      Nose: Nose normal.      Mouth/Throat:      Mouth: Mucous membranes are moist.      Pharynx: Oropharynx is clear.   Eyes:      Extraocular Movements: Extraocular movements intact.      Conjunctiva/sclera: Conjunctivae normal.      Pupils: Pupils are equal, round, and reactive to light.   Cardiovascular:      Rate and Rhythm: Normal rate and regular rhythm.      Pulses: Normal pulses.      Heart sounds: Normal heart sounds.   Pulmonary:      Effort: Pulmonary effort is normal. No respiratory distress.      Breath sounds: Normal breath sounds. No wheezing, rhonchi or rales.   Abdominal:      General: Bowel sounds are normal.      Palpations: Abdomen is soft.      Tenderness: There is no abdominal tenderness. There is no right CVA tenderness, left CVA tenderness, guarding or rebound.    Genitourinary:     Comments: Samson with clear yellow urine  Musculoskeletal:         General: No swelling. Normal range of motion.      Cervical back: Normal range of motion and neck supple.   Skin:     General: Skin is warm and dry.      Capillary Refill: Capillary refill takes less than 2 seconds.      Findings: Lesion present. No rash.      Comments: Left lower leg wound   Neurological:      General: No focal deficit present.      Mental Status: She is alert. Mental status is at baseline.      Comments: Slight tremor   Psychiatric:         Mood and Affect: Mood normal.         Behavior: Behavior normal.                  Code Status  Full Code     Assessment/Plan      Complicated CAUTI, POA  Continue patient on IV Rocephin   Blood cultures are negative x 1 day  Urine cultures are still pending  Patient unable to elaborate exactly why she has catheter in place, appears to be related to chronic incontinence and wounds  Will request wound care consultation     Hypotension unclear if related to sepsis or poor intake  Patient had good response to IV fluids in emergency department  Continue supplemental IV fluids for now  Monitor blood pressure closely  Avoid hypotensive medications- reintroduce home meds as appropriate      Slow transit constipation  Patient appeared to have fecal impaction on diagnostic imaging  Patient was partially disimpacted from the ER report is remaining stool is soft  We will continue patient on MiraLAX     Major depressive disorder with history of psychosis  Continue home medications    Left lower leg wound  Wound care consult appreciated  Wound care recommends follow up in [facility] for this    DVT ppx: Lovenox     Spent 35 minutes in the follow-up management of this patient today.  Please see orders for more complete plan    Solo Sage MD

## 2024-05-05 LAB
ANION GAP SERPL CALC-SCNC: <7 MMOL/L (ref 10–20)
BACTERIA UR CULT: ABNORMAL
BUN SERPL-MCNC: 11 MG/DL (ref 6–23)
CALCIUM SERPL-MCNC: 8.5 MG/DL (ref 8.6–10.3)
CHLORIDE SERPL-SCNC: 103 MMOL/L (ref 98–107)
CO2 SERPL-SCNC: 27 MMOL/L (ref 21–32)
CREAT SERPL-MCNC: 0.41 MG/DL (ref 0.5–1.05)
EGFRCR SERPLBLD CKD-EPI 2021: >90 ML/MIN/1.73M*2
ERYTHROCYTE [DISTWIDTH] IN BLOOD BY AUTOMATED COUNT: 13.9 % (ref 11.5–14.5)
GLUCOSE SERPL-MCNC: 96 MG/DL (ref 74–99)
HCT VFR BLD AUTO: 30.2 % (ref 36–46)
HGB BLD-MCNC: 9.9 G/DL (ref 12–16)
MAGNESIUM SERPL-MCNC: 1.62 MG/DL (ref 1.6–2.4)
MCH RBC QN AUTO: 32 PG (ref 26–34)
MCHC RBC AUTO-ENTMCNC: 32.8 G/DL (ref 32–36)
MCV RBC AUTO: 98 FL (ref 80–100)
NRBC BLD-RTO: 0 /100 WBCS (ref 0–0)
PLATELET # BLD AUTO: 187 X10*3/UL (ref 150–450)
POTASSIUM SERPL-SCNC: 3.4 MMOL/L (ref 3.5–5.3)
RBC # BLD AUTO: 3.09 X10*6/UL (ref 4–5.2)
SODIUM SERPL-SCNC: 132 MMOL/L (ref 136–145)
WBC # BLD AUTO: 5.7 X10*3/UL (ref 4.4–11.3)

## 2024-05-05 PROCEDURE — 85027 COMPLETE CBC AUTOMATED: CPT | Performed by: PHYSICIAN ASSISTANT

## 2024-05-05 PROCEDURE — 2500000004 HC RX 250 GENERAL PHARMACY W/ HCPCS (ALT 636 FOR OP/ED): Performed by: NURSE PRACTITIONER

## 2024-05-05 PROCEDURE — 36415 COLL VENOUS BLD VENIPUNCTURE: CPT | Performed by: PHYSICIAN ASSISTANT

## 2024-05-05 PROCEDURE — 2500000001 HC RX 250 WO HCPCS SELF ADMINISTERED DRUGS (ALT 637 FOR MEDICARE OP): Performed by: NURSE PRACTITIONER

## 2024-05-05 PROCEDURE — 80048 BASIC METABOLIC PNL TOTAL CA: CPT | Performed by: PHYSICIAN ASSISTANT

## 2024-05-05 PROCEDURE — 2500000001 HC RX 250 WO HCPCS SELF ADMINISTERED DRUGS (ALT 637 FOR MEDICARE OP): Performed by: PHYSICIAN ASSISTANT

## 2024-05-05 PROCEDURE — 99233 SBSQ HOSP IP/OBS HIGH 50: CPT | Performed by: INTERNAL MEDICINE

## 2024-05-05 PROCEDURE — 83735 ASSAY OF MAGNESIUM: CPT | Performed by: PHYSICIAN ASSISTANT

## 2024-05-05 PROCEDURE — 1210000001 HC SEMI-PRIVATE ROOM DAILY

## 2024-05-05 PROCEDURE — 2500000004 HC RX 250 GENERAL PHARMACY W/ HCPCS (ALT 636 FOR OP/ED): Performed by: INTERNAL MEDICINE

## 2024-05-05 PROCEDURE — 2500000006 HC RX 250 W HCPCS SELF ADMINISTERED DRUGS (ALT 637 FOR ALL PAYERS): Mod: MUE | Performed by: INTERNAL MEDICINE

## 2024-05-05 RX ORDER — POTASSIUM CHLORIDE 20 MEQ/1
40 TABLET, EXTENDED RELEASE ORAL 2 TIMES DAILY
Status: COMPLETED | OUTPATIENT
Start: 2024-05-05 | End: 2024-05-05

## 2024-05-05 RX ADMIN — POTASSIUM CHLORIDE 40 MEQ: 1500 TABLET, EXTENDED RELEASE ORAL at 20:19

## 2024-05-05 RX ADMIN — ASPIRIN 81 MG CHEWABLE TABLET 81 MG: 81 TABLET CHEWABLE at 08:09

## 2024-05-05 RX ADMIN — ARIPIPRAZOLE 2.5 MG: 5 TABLET ORAL at 08:09

## 2024-05-05 RX ADMIN — BRIMONIDINE TARTRATE 1 DROP: 2 SOLUTION/ DROPS OPHTHALMIC at 20:19

## 2024-05-05 RX ADMIN — CEFTRIAXONE SODIUM 1 G: 1 INJECTION, SOLUTION INTRAVENOUS at 16:30

## 2024-05-05 RX ADMIN — POTASSIUM CHLORIDE 40 MEQ: 1500 TABLET, EXTENDED RELEASE ORAL at 12:09

## 2024-05-05 RX ADMIN — TRAZODONE HYDROCHLORIDE 25 MG: 50 TABLET ORAL at 20:19

## 2024-05-05 RX ADMIN — TIMOLOL MALEATE 1 DROP: 5 SOLUTION/ DROPS OPHTHALMIC at 08:14

## 2024-05-05 RX ADMIN — DIVALPROEX SODIUM 125 MG: 125 CAPSULE, COATED PELLETS ORAL at 08:09

## 2024-05-05 RX ADMIN — POLYETHYLENE GLYCOL 3350 17 G: 17 POWDER, FOR SOLUTION ORAL at 08:09

## 2024-05-05 RX ADMIN — BRIMONIDINE TARTRATE 1 DROP: 2 SOLUTION/ DROPS OPHTHALMIC at 08:14

## 2024-05-05 RX ADMIN — PROPRANOLOL HYDROCHLORIDE 20 MG: 10 TABLET ORAL at 08:09

## 2024-05-05 RX ADMIN — ENOXAPARIN SODIUM 40 MG: 40 INJECTION SUBCUTANEOUS at 20:19

## 2024-05-05 RX ADMIN — ONDANSETRON 4 MG: 2 INJECTION INTRAMUSCULAR; INTRAVENOUS at 12:11

## 2024-05-05 RX ADMIN — SODIUM CHLORIDE, POTASSIUM CHLORIDE, SODIUM LACTATE AND CALCIUM CHLORIDE 100 ML/HR: 600; 310; 30; 20 INJECTION, SOLUTION INTRAVENOUS at 20:18

## 2024-05-05 RX ADMIN — PROPRANOLOL HYDROCHLORIDE 20 MG: 10 TABLET ORAL at 20:19

## 2024-05-05 RX ADMIN — DIVALPROEX SODIUM 125 MG: 125 CAPSULE, COATED PELLETS ORAL at 20:19

## 2024-05-05 ASSESSMENT — COGNITIVE AND FUNCTIONAL STATUS - GENERAL
CLIMB 3 TO 5 STEPS WITH RAILING: TOTAL
MOVING TO AND FROM BED TO CHAIR: A LOT
DRESSING REGULAR LOWER BODY CLOTHING: A LOT
TOILETING: A LOT
MOVING TO AND FROM BED TO CHAIR: A LITTLE
WALKING IN HOSPITAL ROOM: TOTAL
DAILY ACTIVITIY SCORE: 14
MOBILITY SCORE: 11
STANDING UP FROM CHAIR USING ARMS: A LOT
DAILY ACTIVITIY SCORE: 14
TURNING FROM BACK TO SIDE WHILE IN FLAT BAD: A LOT
TURNING FROM BACK TO SIDE WHILE IN FLAT BAD: A LOT
HELP NEEDED FOR BATHING: A LOT
WALKING IN HOSPITAL ROOM: TOTAL
PERSONAL GROOMING: A LITTLE
PERSONAL GROOMING: A LOT
MOBILITY SCORE: 13
DRESSING REGULAR LOWER BODY CLOTHING: A LOT
DRESSING REGULAR UPPER BODY CLOTHING: A LOT
MOVING FROM LYING ON BACK TO SITTING ON SIDE OF FLAT BED WITH BEDRAILS: A LITTLE
HELP NEEDED FOR BATHING: A LOT
TOILETING: A LOT
EATING MEALS: A LITTLE
DRESSING REGULAR UPPER BODY CLOTHING: A LOT
CLIMB 3 TO 5 STEPS WITH RAILING: TOTAL
STANDING UP FROM CHAIR USING ARMS: A LOT

## 2024-05-05 ASSESSMENT — ENCOUNTER SYMPTOMS
VOMITING: 0
DYSURIA: 0
FATIGUE: 0
HEADACHES: 0
JOINT SWELLING: 0
APPETITE CHANGE: 0
CHEST TIGHTNESS: 0
LIGHT-HEADEDNESS: 0
CHILLS: 0
SHORTNESS OF BREATH: 0
COUGH: 0
HALLUCINATIONS: 0
NAUSEA: 0
CONSTIPATION: 0
WOUND: 0
FEVER: 0
SORE THROAT: 0
FREQUENCY: 0
BACK PAIN: 0
PALPITATIONS: 0
TROUBLE SWALLOWING: 0
BLOOD IN STOOL: 0
BRUISES/BLEEDS EASILY: 0
EYE PAIN: 0
ABDOMINAL PAIN: 0
DIARRHEA: 0
NUMBNESS: 0
WHEEZING: 0
HEMATURIA: 0
DIAPHORESIS: 0
WEAKNESS: 0
FACIAL SWELLING: 0
DIZZINESS: 0
FLANK PAIN: 0

## 2024-05-05 ASSESSMENT — PAIN - FUNCTIONAL ASSESSMENT
PAIN_FUNCTIONAL_ASSESSMENT: 0-10
PAIN_FUNCTIONAL_ASSESSMENT: 0-10

## 2024-05-05 ASSESSMENT — PAIN SCALES - GENERAL
PAINLEVEL_OUTOF10: 0 - NO PAIN
PAINLEVEL_OUTOF10: 0 - NO PAIN

## 2024-05-05 NOTE — CARE PLAN
Problem: Skin  Goal: Decreased wound size/increased tissue granulation at next dressing change  5/5/2024 0955 by Amy Sorensen RN  Flowsheets (Taken 5/5/2024 0955)  Decreased wound size/increased tissue granulation at next dressing change: Promote sleep for wound healing  5/5/2024 0955 by Amy Sorensen RN  Outcome: Progressing  Flowsheets (Taken 5/5/2024 0955)  Decreased wound size/increased tissue granulation at next dressing change: Promote sleep for wound healing  Goal: Participates in plan/prevention/treatment measures  5/5/2024 0955 by Amy Sorensen RN  Flowsheets (Taken 5/4/2024 1253 by Giulia Byers RN)  Participates in plan/prevention/treatment measures: Elevate heels  5/5/2024 0955 by Amy Sorensen RN  Outcome: Progressing  Goal: Prevent/manage excess moisture  5/5/2024 0955 by Amy Sorensen RN  Flowsheets (Taken 5/5/2024 0955)  Prevent/manage excess moisture: Moisturize dry skin  5/5/2024 0955 by Amy Sorensen RN  Outcome: Progressing  Flowsheets (Taken 5/5/2024 0955)  Prevent/manage excess moisture: Moisturize dry skin  Goal: Prevent/minimize sheer/friction injuries  5/5/2024 0955 by Amy Sorensen RN  Flowsheets (Taken 5/5/2024 0955)  Prevent/minimize sheer/friction injuries: Increase activity/out of bed for meals  5/5/2024 0955 by Amy Sorensen RN  Outcome: Progressing  Flowsheets (Taken 5/5/2024 0955)  Prevent/minimize sheer/friction injuries: Increase activity/out of bed for meals  Goal: Promote/optimize nutrition  5/5/2024 0955 by Amy Sorensen RN  Flowsheets (Taken 5/5/2024 0955)  Promote/optimize nutrition: Offer water/supplements/favorite foods  5/5/2024 0955 by Amy Sorensen RN  Outcome: Progressing  Flowsheets (Taken 5/5/2024 0955)  Promote/optimize nutrition: Offer water/supplements/favorite foods  Goal: Promote skin healing  5/5/2024 0955 by Amy Sorensen RN  Flowsheets (Taken 5/5/2024 0955)  Promote skin healing: Rotate device position/do not position patient on device  5/5/2024  0955 by Amy Sorensen, RN  Outcome: Progressing  Flowsheets (Taken 5/5/2024 0955)  Promote skin healing: Rotate device position/do not position patient on device   The patient's goals for the shift include      The clinical goals for the shift include pt will remain free from fall or injury

## 2024-05-05 NOTE — PROGRESS NOTES
Qi Davila is a 83 y.o. female on day 2 of admission presenting with Complicated UTI (urinary tract infection).      Subjective   Qi Davila is a 83 y.o. 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  The patient was sent to the emergency department due to reports of low blood pressure and urinary tract infection patient had been more fatigued at her long-term care facility.  She does have some chronic memory loss but denies abdominal pain fevers chills nausea she states she has not been eating very well. Patient was also hypotensive reports of blood pressure in the 80s over 40s subsequently given IV fluids with improvement to 90s over 40s and now in the low 100s. CT scan of abdomen and pelvis was negative for any obstruction there were findings of increased stool in the rectal vault CT of the head was without acute findings chest x-ray also negative for any infiltrate effusion or pulmonary edema.  EKG shows sinus rhythm at a rate of 66 bpm no ST change elevation nonischemic EKG, normal intervals. Lactate negative. Manual disimpaction in ED @1705- report is remaining stool is soft.     5/3/2024: No acute events overnight. Vitals stable, no further hypotension, /81 this morning. Labs largely unremarkable.  No leukocytosis. Does have some anemia, suspect element of hemodilution. She denies any acute complaints,s he is actually oriented x2-2.5  5/4: Patient was seen and examined.  She is awake and alert and oriented x 3.  Blood pressure remained stable.  PT/OT yet to see.  Blood cultures are negative.  Urine cultures are pending.  Continue to trend CBC and BMP daily.  5/5: Patient was seen and examined.  Continues to be awake alert and interactive.  Seen by PT OT who recommend extended-care facility on discharge.  Blood cultures are  positive for Enterococcus faecium awaiting sensitivities; blood cultures remain negative.  Continue to trend CBC and BMP daily.       Review of Systems   Constitutional:  Negative for appetite change, chills, diaphoresis, fatigue and fever.   HENT:  Negative for congestion, ear pain, facial swelling, hearing loss, nosebleeds, sore throat, tinnitus and trouble swallowing.    Eyes:  Negative for pain.   Respiratory:  Negative for cough, chest tightness, shortness of breath and wheezing.    Cardiovascular:  Negative for chest pain, palpitations and leg swelling.   Gastrointestinal:  Negative for abdominal pain, blood in stool, constipation, diarrhea, nausea and vomiting.   Genitourinary:  Negative for dysuria, flank pain, frequency, hematuria and urgency.   Musculoskeletal:  Negative for back pain and joint swelling.   Skin:  Negative for rash and wound.   Neurological:  Negative for dizziness, syncope, weakness, light-headedness, numbness and headaches.   Hematological:  Does not bruise/bleed easily.   Psychiatric/Behavioral:  Negative for behavioral problems, hallucinations and suicidal ideas.           Objective     Last Recorded Vitals  /72 (BP Location: Right arm, Patient Position: Lying)   Pulse 75   Temp 36.6 °C (97.8 °F) (Temporal)   Resp 18   Wt 81.6 kg (180 lb)   SpO2 96%     Image Results  CT abdomen pelvis w IV contrast    Result Date: 5/2/2024  STUDY: CT Abdomen and Pelvis with IV Contrast; 05/02/2024, 3:59 PM. INDICATION: Generalized abdominal pain. COMPARISON: CT AP: 09/28/23. ACCESSION NUMBER(S): [id number] ORDERING CLINICIAN: CELENA JONAS TECHNIQUE: CT of the abdomen and pelvis was performed.  Contiguous axial images were obtained at 3 mm slice thickness through the abdomen and pelvis. Coronal and sagittal reconstructions at 3 mm slice thickness were performed.  Omnipaque 350 75 mL was administered intravenously.  FINDINGS: LOWER CHEST: No cardiomegaly.  No pericardial effusion.   Linear bibasilar areas of atelectasis  ABDOMEN:  LIVER: No hepatomegaly.  Smooth surface contour.  Mild fatty infiltration of the liver.  Stable small hepatic cysts  BILE DUCTS: No intrahepatic or extrahepatic biliary ductal dilatation.  GALLBLADDER: The gallbladder is present without gallstones. STOMACH: Moderate size hiatal hernia.  PANCREAS: Pancreatic parenchymal calcifications  SPLEEN: No splenomegaly or focal splenic lesion.  ADRENAL GLANDS: No thickening or nodules.  KIDNEYS AND URETERS: Kidneys are normal in size and location.  No renal or ureteral calculi.  PELVIS:  BLADDER: No abnormalities identified.  REPRODUCTIVE ORGANS: No abnormalities identified.  BOWEL: Appendix is normal.  Large amount of formed stool in the rectal vault measuring up to 8.3 cm in diameter with mild perirectal fat stranding.  Moderate stool throughout the remainder of the colon.  VESSELS: No abnormalities identified.  Abdominal aorta is normal in caliber. Moderate to severe plaque along the distal aorta.  PERITONEUM/RETROPERITONEUM/LYMPH NODES: No free fluid.  No pneumoperitoneum. No lymphadenopathy.  ABDOMINAL WALL: No abnormalities identified. SOFT TISSUES: No abnormalities identified.  BONES: No acute fracture or aggressive osseous lesion.  Bilateral total hip arthroplasty.  Scoliosis and degenerative change of the lumbar spine.    1. Large amount of formed stool in the rectal vault measuring up to 8.3 cm in diameter with mild perirectal fat stranding. Moderate stool throughout the remainder of the colon. Findings suggestive of fecal impaction with mild associated stercoral proctitis. 2. Mild hepatic steatosis. 3. Moderate size hiatal hernia. 4. Findings of chronic pancreatitis. Signed by Frankie Galindo MD    CT head wo IV contrast    Result Date: 5/2/2024  Interpreted By:  Joesph Ramírez, STUDY: CT HEAD WO IV CONTRAST;  5/2/2024 3:55 pm   INDICATION: Altered mental status.   COMPARISON: None.   ACCESSION NUMBER(S):  [id number]   ORDERING CLINICIAN: CELENA JONAS   TECHNIQUE: Noncontrast axial CT scan of head was performed.   FINDINGS: Parenchyma: There is no intracranial hemorrhage. The grey-white differentiation is intact. There is no mass effect or midline shift. Patchy supratentorial hypodensity, nonspecific, but likely secondary to mild chronic microvascular ischemia.   CSF Spaces: Mild generalized volume loss with concordant ventriculomegaly.   Extra-Axial Fluid: There is no extraaxial fluid collection.   Calvarium: The calvarium is unremarkable.   Paranasal sinuses: Visualized paranasal sinuses are clear.   Mastoids: Clear.   Orbits: Normal.   Soft tissues: Unremarkable.       No acute intracranial hemorrhage, mass effect, or CT apparent acute infarct. Chronic microvascular ischemic and involutional changes.   Signed by: Joesph Ramírez 5/2/2024 4:13 PM Dictation workstation:   [id number]    XR chest 2 views    Result Date: 5/2/2024  STUDY: Chest Radiographs;  5/2/2024 at 14:26 INDICATION: Lethargy, cough. COMPARISON: XR Chest 10/2/23. ACCESSION NUMBER(S): [id number] ORDERING CLINICIAN: CELENA JONAS TECHNIQUE:  Frontal and lateral chest. FINDINGS: CARDIOMEDIASTINAL SILHOUETTE: Cardiomediastinal silhouette is normal in size and configuration.  LUNGS: There is elevation the left hemidiaphragm.  ABDOMEN: No remarkable upper abdominal findings.  BONES: There are degenerative change with joint space loss osteophytes and loose bodies involving both shoulders.    No evidence consolidating infiltrate or effusion. Signed by Christopher Villalta MD    ECG 12 lead    Result Date: 5/2/2024  Sinus rhythm Low voltage, precordial leads       Lab Results  Results for orders placed or performed during the hospital encounter of 05/02/24 (from the past 24 hour(s))   CBC   Result Value Ref Range    WBC 5.7 4.4 - 11.3 x10*3/uL    nRBC 0.0 0.0 - 0.0 /100 WBCs    RBC 3.09 (L) 4.00 - 5.20 x10*6/uL    Hemoglobin 9.9 (L) 12.0 - 16.0 g/dL     Hematocrit 30.2 (L) 36.0 - 46.0 %    MCV 98 80 - 100 fL    MCH 32.0 26.0 - 34.0 pg    MCHC 32.8 32.0 - 36.0 g/dL    RDW 13.9 11.5 - 14.5 %    Platelets 187 150 - 450 x10*3/uL   Basic Metabolic Panel   Result Value Ref Range    Glucose 96 74 - 99 mg/dL    Sodium 132 (L) 136 - 145 mmol/L    Potassium 3.4 (L) 3.5 - 5.3 mmol/L    Chloride 103 98 - 107 mmol/L    Bicarbonate 27 21 - 32 mmol/L    Anion Gap <7 (L) 10 - 20 mmol/L    Urea Nitrogen 11 6 - 23 mg/dL    Creatinine 0.41 (L) 0.50 - 1.05 mg/dL    eGFR >90 >60 mL/min/1.73m*2    Calcium 8.5 (L) 8.6 - 10.3 mg/dL   Magnesium   Result Value Ref Range    Magnesium 1.62 1.60 - 2.40 mg/dL        Medications  Scheduled medications:  ARIPiprazole, 2.5 mg, oral, Daily  aspirin, 81 mg, oral, Daily  brimonidine, 1 drop, Both Eyes, BID  cefTRIAXone, 1 g, intravenous, q24h  divalproex sprinkle, 125 mg, oral, BID  enoxaparin, 40 mg, subcutaneous, q24h  polyethylene glycol, 17 g, oral, Daily  propranolol, 20 mg, oral, BID  timolol, 1 drop, Both Eyes, Daily  traZODone, 25 mg, oral, Nightly      Continuous medications:  lactated Ringer's, 100 mL/hr, Last Rate: 100 mL/hr (05/05/24 0956)      PRN medications:  PRN medications: alum-mag hydroxide-simeth, ondansetron     Physical Exam  Constitutional:       General: She is not in acute distress.     Appearance: Normal appearance.      Comments: Elderly female, siting upright in bed   HENT:      Head: Normocephalic and atraumatic.      Right Ear: External ear normal.      Left Ear: External ear normal.      Nose: Nose normal.      Mouth/Throat:      Mouth: Mucous membranes are moist.      Pharynx: Oropharynx is clear.   Eyes:      Extraocular Movements: Extraocular movements intact.      Conjunctiva/sclera: Conjunctivae normal.      Pupils: Pupils are equal, round, and reactive to light.   Cardiovascular:      Rate and Rhythm: Normal rate and regular rhythm.      Pulses: Normal pulses.      Heart sounds: Normal heart sounds.    Pulmonary:      Effort: Pulmonary effort is normal. No respiratory distress.      Breath sounds: Normal breath sounds. No wheezing, rhonchi or rales.   Abdominal:      General: Bowel sounds are normal.      Palpations: Abdomen is soft.      Tenderness: There is no abdominal tenderness. There is no right CVA tenderness, left CVA tenderness, guarding or rebound.   Genitourinary:     Comments: Samson with clear yellow urine  Musculoskeletal:         General: No swelling. Normal range of motion.      Cervical back: Normal range of motion and neck supple.   Skin:     General: Skin is warm and dry.      Capillary Refill: Capillary refill takes less than 2 seconds.      Findings: Lesion present. No rash.      Comments: Left lower leg wound   Neurological:      General: No focal deficit present.      Mental Status: She is alert. Mental status is at baseline.      Comments: Slight tremor   Psychiatric:         Mood and Affect: Mood normal.         Behavior: Behavior normal.                  Code Status  Full Code     Assessment/Plan      Complicated CAUTI, POA  Continue patient on IV Rocephin   Blood cultures are negative x 1 day  Urine cultures are still pending  Patient unable to elaborate exactly why she has catheter in place, appears to be related to chronic incontinence and wounds  Will request wound care consultation     Hypotension unclear if related to sepsis or poor intake  Patient had good response to IV fluids in emergency department  Continue supplemental IV fluids for now  Monitor blood pressure closely  Avoid hypotensive medications- reintroduce home meds as appropriate      Slow transit constipation  Patient appeared to have fecal impaction on diagnostic imaging  Patient was partially disimpacted from the ER report is remaining stool is soft  We will continue patient on MiraLAX     Major depressive disorder with history of psychosis  Continue home medications    Left lower leg wound  Wound care consult  appreciated  Wound care recommends follow up in [place] wound clinic for this    DVT ppx: Lovenox     Spent 35 minutes in the follow-up management of this patient today.  Please see orders for more complete plan    Solo Sage MD

## 2024-05-05 NOTE — CARE PLAN
The patient's goals for the shift include      The clinical goals for the shift include pt will remain free from fall or injury

## 2024-05-05 NOTE — CARE PLAN
The patient's goals for the shift include      The clinical goals for the shift include pt will remain free from fall or injury    Over the shift, the patient did not make progress toward the following goals. Barriers to progression include . Recommendations to address these barriers include   Problem: Safety - Adult  Goal: Free from fall injury  Outcome: Progressing     Problem: Discharge Planning  Goal: Discharge to home or other facility with appropriate resources  Outcome: Progressing     Problem: Chronic Conditions and Co-morbidities  Goal: Patient's chronic conditions and co-morbidity symptoms are monitored and maintained or improved  Outcome: Progressing     Problem: Skin  Goal: Decreased wound size/increased tissue granulation at next dressing change  Outcome: Progressing  Goal: Participates in plan/prevention/treatment measures  Outcome: Progressing  Goal: Prevent/manage excess moisture  Outcome: Progressing  Goal: Prevent/minimize sheer/friction injuries  Outcome: Progressing  Flowsheets (Taken 5/5/2024 3532)  Prevent/minimize sheer/friction injuries:   HOB 30 degrees or less   Turn/reposition every 2 hours/use positioning/transfer devices   Increase activity/out of bed for meals  Goal: Promote/optimize nutrition  Outcome: Progressing  Goal: Promote skin healing  Outcome: Progressing     Problem: Nutrition  Goal: Oral intake greater 75%  Outcome: Progressing  Goal: Consume prescribed supplement  Outcome: Progressing  Goal: Promote healing  Outcome: Progressing     Problem: Fall/Injury  Goal: Not fall by end of shift  Outcome: Progressing  Goal: Be free from injury by end of the shift  Outcome: Progressing  Goal: Verbalize understanding of personal risk factors for fall in the hospital  Outcome: Progressing  Goal: Verbalize understanding of risk factor reduction measures to prevent injury from fall in the home  Outcome: Progressing  Goal: Use assistive devices by end of the shift  Outcome:  Progressing  Goal: Pace activities to prevent fatigue by end of the shift  Outcome: Progressing   .

## 2024-05-06 LAB
ANION GAP SERPL CALC-SCNC: <7 MMOL/L (ref 10–20)
BUN SERPL-MCNC: 10 MG/DL (ref 6–23)
CALCIUM SERPL-MCNC: 9 MG/DL (ref 8.6–10.3)
CHLORIDE SERPL-SCNC: 102 MMOL/L (ref 98–107)
CO2 SERPL-SCNC: 28 MMOL/L (ref 21–32)
CREAT SERPL-MCNC: 0.38 MG/DL (ref 0.5–1.05)
EGFRCR SERPLBLD CKD-EPI 2021: >90 ML/MIN/1.73M*2
ERYTHROCYTE [DISTWIDTH] IN BLOOD BY AUTOMATED COUNT: 13.7 % (ref 11.5–14.5)
GLUCOSE SERPL-MCNC: 94 MG/DL (ref 74–99)
HCT VFR BLD AUTO: 30.7 % (ref 36–46)
HGB BLD-MCNC: 10.5 G/DL (ref 12–16)
MAGNESIUM SERPL-MCNC: 1.61 MG/DL (ref 1.6–2.4)
MCH RBC QN AUTO: 32.5 PG (ref 26–34)
MCHC RBC AUTO-ENTMCNC: 34.2 G/DL (ref 32–36)
MCV RBC AUTO: 95 FL (ref 80–100)
NRBC BLD-RTO: 0 /100 WBCS (ref 0–0)
PLATELET # BLD AUTO: 221 X10*3/UL (ref 150–450)
POTASSIUM SERPL-SCNC: 3.8 MMOL/L (ref 3.5–5.3)
RBC # BLD AUTO: 3.23 X10*6/UL (ref 4–5.2)
SODIUM SERPL-SCNC: 132 MMOL/L (ref 136–145)
WBC # BLD AUTO: 6 X10*3/UL (ref 4.4–11.3)

## 2024-05-06 PROCEDURE — 97530 THERAPEUTIC ACTIVITIES: CPT | Mod: GP,CQ | Performed by: PHYSICAL THERAPY ASSISTANT

## 2024-05-06 PROCEDURE — 97112 NEUROMUSCULAR REEDUCATION: CPT | Mod: GP,CQ | Performed by: PHYSICAL THERAPY ASSISTANT

## 2024-05-06 PROCEDURE — 97530 THERAPEUTIC ACTIVITIES: CPT | Mod: GO,CO

## 2024-05-06 PROCEDURE — 2500000004 HC RX 250 GENERAL PHARMACY W/ HCPCS (ALT 636 FOR OP/ED)

## 2024-05-06 PROCEDURE — 2500000006 HC RX 250 W HCPCS SELF ADMINISTERED DRUGS (ALT 637 FOR ALL PAYERS): Mod: MUE | Performed by: INTERNAL MEDICINE

## 2024-05-06 PROCEDURE — 97535 SELF CARE MNGMENT TRAINING: CPT | Mod: GO,CO

## 2024-05-06 PROCEDURE — 1210000001 HC SEMI-PRIVATE ROOM DAILY

## 2024-05-06 PROCEDURE — 36415 COLL VENOUS BLD VENIPUNCTURE: CPT | Performed by: PHYSICIAN ASSISTANT

## 2024-05-06 PROCEDURE — 2500000004 HC RX 250 GENERAL PHARMACY W/ HCPCS (ALT 636 FOR OP/ED): Performed by: NURSE PRACTITIONER

## 2024-05-06 PROCEDURE — 83735 ASSAY OF MAGNESIUM: CPT | Performed by: PHYSICIAN ASSISTANT

## 2024-05-06 PROCEDURE — 99233 SBSQ HOSP IP/OBS HIGH 50: CPT | Performed by: INTERNAL MEDICINE

## 2024-05-06 PROCEDURE — 80048 BASIC METABOLIC PNL TOTAL CA: CPT | Performed by: PHYSICIAN ASSISTANT

## 2024-05-06 PROCEDURE — 2500000001 HC RX 250 WO HCPCS SELF ADMINISTERED DRUGS (ALT 637 FOR MEDICARE OP): Performed by: NURSE PRACTITIONER

## 2024-05-06 PROCEDURE — 2500000001 HC RX 250 WO HCPCS SELF ADMINISTERED DRUGS (ALT 637 FOR MEDICARE OP): Performed by: INTERNAL MEDICINE

## 2024-05-06 PROCEDURE — 85027 COMPLETE CBC AUTOMATED: CPT | Performed by: PHYSICIAN ASSISTANT

## 2024-05-06 PROCEDURE — 2500000001 HC RX 250 WO HCPCS SELF ADMINISTERED DRUGS (ALT 637 FOR MEDICARE OP): Performed by: PHYSICIAN ASSISTANT

## 2024-05-06 RX ORDER — TAMSULOSIN HYDROCHLORIDE 0.4 MG/1
0.4 CAPSULE ORAL DAILY
Status: DISCONTINUED | OUTPATIENT
Start: 2024-05-06 | End: 2024-05-09 | Stop reason: HOSPADM

## 2024-05-06 RX ORDER — VANCOMYCIN HYDROCHLORIDE 1 G/20ML
INJECTION, POWDER, LYOPHILIZED, FOR SOLUTION INTRAVENOUS DAILY PRN
Status: DISCONTINUED | OUTPATIENT
Start: 2024-05-06 | End: 2024-05-09 | Stop reason: HOSPADM

## 2024-05-06 RX ORDER — LOPERAMIDE HYDROCHLORIDE 2 MG/1
4 CAPSULE ORAL 4 TIMES DAILY PRN
Status: DISCONTINUED | OUTPATIENT
Start: 2024-05-06 | End: 2024-05-09 | Stop reason: HOSPADM

## 2024-05-06 RX ORDER — POLYETHYLENE GLYCOL 3350 17 G/17G
17 POWDER, FOR SOLUTION ORAL DAILY PRN
Status: DISCONTINUED | OUTPATIENT
Start: 2024-05-06 | End: 2024-05-09 | Stop reason: HOSPADM

## 2024-05-06 RX ORDER — POTASSIUM CHLORIDE 20 MEQ/1
20 TABLET, EXTENDED RELEASE ORAL DAILY
Status: DISCONTINUED | OUTPATIENT
Start: 2024-05-06 | End: 2024-05-09 | Stop reason: HOSPADM

## 2024-05-06 RX ORDER — CHOLECALCIFEROL (VITAMIN D3) 125 MCG
3000 CAPSULE ORAL
Status: DISCONTINUED | OUTPATIENT
Start: 2024-05-06 | End: 2024-05-09 | Stop reason: HOSPADM

## 2024-05-06 RX ORDER — FUROSEMIDE 20 MG/1
20 TABLET ORAL EVERY MORNING
Status: DISCONTINUED | OUTPATIENT
Start: 2024-05-06 | End: 2024-05-09 | Stop reason: HOSPADM

## 2024-05-06 RX ORDER — DOCUSATE SODIUM 100 MG/1
100 CAPSULE, LIQUID FILLED ORAL 2 TIMES DAILY PRN
Status: DISCONTINUED | OUTPATIENT
Start: 2024-05-06 | End: 2024-05-09 | Stop reason: HOSPADM

## 2024-05-06 RX ADMIN — CEFTRIAXONE SODIUM 1 G: 1 INJECTION, SOLUTION INTRAVENOUS at 17:19

## 2024-05-06 RX ADMIN — TRAZODONE HYDROCHLORIDE 25 MG: 50 TABLET ORAL at 20:31

## 2024-05-06 RX ADMIN — BRIMONIDINE TARTRATE 1 DROP: 2 SOLUTION/ DROPS OPHTHALMIC at 20:32

## 2024-05-06 RX ADMIN — ENOXAPARIN SODIUM 40 MG: 40 INJECTION SUBCUTANEOUS at 20:31

## 2024-05-06 RX ADMIN — TIMOLOL MALEATE 1 DROP: 5 SOLUTION/ DROPS OPHTHALMIC at 09:30

## 2024-05-06 RX ADMIN — ARIPIPRAZOLE 2.5 MG: 5 TABLET ORAL at 09:31

## 2024-05-06 RX ADMIN — FUROSEMIDE 20 MG: 20 TABLET ORAL at 11:28

## 2024-05-06 RX ADMIN — DIVALPROEX SODIUM 125 MG: 125 CAPSULE, COATED PELLETS ORAL at 09:31

## 2024-05-06 RX ADMIN — SODIUM CHLORIDE, POTASSIUM CHLORIDE, SODIUM LACTATE AND CALCIUM CHLORIDE 100 ML/HR: 600; 310; 30; 20 INJECTION, SOLUTION INTRAVENOUS at 20:43

## 2024-05-06 RX ADMIN — ASPIRIN 81 MG CHEWABLE TABLET 81 MG: 81 TABLET CHEWABLE at 09:31

## 2024-05-06 RX ADMIN — TAMSULOSIN HYDROCHLORIDE 0.4 MG: 0.4 CAPSULE ORAL at 11:28

## 2024-05-06 RX ADMIN — POTASSIUM CHLORIDE 20 MEQ: 1500 TABLET, EXTENDED RELEASE ORAL at 11:28

## 2024-05-06 RX ADMIN — BRIMONIDINE TARTRATE 1 DROP: 2 SOLUTION/ DROPS OPHTHALMIC at 09:31

## 2024-05-06 RX ADMIN — VANCOMYCIN HYDROCHLORIDE 1250 MG: 1.25 INJECTION, POWDER, LYOPHILIZED, FOR SOLUTION INTRAVENOUS at 11:27

## 2024-05-06 RX ADMIN — PROPRANOLOL HYDROCHLORIDE 20 MG: 10 TABLET ORAL at 20:31

## 2024-05-06 RX ADMIN — PROPRANOLOL HYDROCHLORIDE 20 MG: 10 TABLET ORAL at 09:31

## 2024-05-06 RX ADMIN — DIVALPROEX SODIUM 125 MG: 125 CAPSULE, COATED PELLETS ORAL at 20:31

## 2024-05-06 RX ADMIN — VANCOMYCIN HYDROCHLORIDE 1250 MG: 1.25 INJECTION, POWDER, LYOPHILIZED, FOR SOLUTION INTRAVENOUS at 20:32

## 2024-05-06 ASSESSMENT — COGNITIVE AND FUNCTIONAL STATUS - GENERAL
MOVING FROM LYING ON BACK TO SITTING ON SIDE OF FLAT BED WITH BEDRAILS: A LOT
MOVING TO AND FROM BED TO CHAIR: TOTAL
PERSONAL GROOMING: A LITTLE
STANDING UP FROM CHAIR USING ARMS: A LOT
TURNING FROM BACK TO SIDE WHILE IN FLAT BAD: A LOT
MOVING FROM LYING ON BACK TO SITTING ON SIDE OF FLAT BED WITH BEDRAILS: A LOT
MOVING TO AND FROM BED TO CHAIR: TOTAL
DRESSING REGULAR UPPER BODY CLOTHING: A LOT
CLIMB 3 TO 5 STEPS WITH RAILING: TOTAL
MOBILITY SCORE: 9
TURNING FROM BACK TO SIDE WHILE IN FLAT BAD: A LOT
WALKING IN HOSPITAL ROOM: TOTAL
HELP NEEDED FOR BATHING: A LOT
WALKING IN HOSPITAL ROOM: TOTAL
MOBILITY SCORE: 9
TOILETING: A LOT
STANDING UP FROM CHAIR USING ARMS: A LOT
CLIMB 3 TO 5 STEPS WITH RAILING: TOTAL
DRESSING REGULAR LOWER BODY CLOTHING: A LOT
DAILY ACTIVITIY SCORE: 14
EATING MEALS: A LITTLE

## 2024-05-06 ASSESSMENT — ENCOUNTER SYMPTOMS
HALLUCINATIONS: 0
COUGH: 0
FATIGUE: 0
FACIAL SWELLING: 0
BACK PAIN: 0
HEMATURIA: 0
WOUND: 0
DIARRHEA: 0
APPETITE CHANGE: 0
FLANK PAIN: 0
DIZZINESS: 0
JOINT SWELLING: 0
FEVER: 0
HEADACHES: 0
WEAKNESS: 0
WHEEZING: 0
EYE PAIN: 0
PALPITATIONS: 0
BRUISES/BLEEDS EASILY: 0
VOMITING: 0
NUMBNESS: 0
SORE THROAT: 0
DIAPHORESIS: 0
CHEST TIGHTNESS: 0
CHILLS: 0
ABDOMINAL PAIN: 0
CONSTIPATION: 0
BLOOD IN STOOL: 0
FREQUENCY: 0
NAUSEA: 0
LIGHT-HEADEDNESS: 0
TROUBLE SWALLOWING: 0
SHORTNESS OF BREATH: 0
DYSURIA: 0

## 2024-05-06 ASSESSMENT — PAIN SCALES - GENERAL
PAINLEVEL_OUTOF10: 0 - NO PAIN
PAINLEVEL_OUTOF10: 0 - NO PAIN

## 2024-05-06 ASSESSMENT — PAIN - FUNCTIONAL ASSESSMENT
PAIN_FUNCTIONAL_ASSESSMENT: 0-10

## 2024-05-06 ASSESSMENT — ACTIVITIES OF DAILY LIVING (ADL)
BATHING_WHERE_ASSESSED: EDGE OF BED
HOME_MANAGEMENT_TIME_ENTRY: 14
BATHING_LEVEL_OF_ASSISTANCE: MODERATE ASSISTANCE

## 2024-05-06 NOTE — PROGRESS NOTES
Qi Davila is a 83 y.o. female on day 3 of admission presenting with Complicated UTI (urinary tract infection).      Subjective   Qi Davila is a 83 y.o. 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  The patient was sent to the emergency department due to reports of low blood pressure and urinary tract infection patient had been more fatigued at her long-term care facility.  She does have some chronic memory loss but denies abdominal pain fevers chills nausea she states she has not been eating very well. Patient was also hypotensive reports of blood pressure in the 80s over 40s subsequently given IV fluids with improvement to 90s over 40s and now in the low 100s. CT scan of abdomen and pelvis was negative for any obstruction there were findings of increased stool in the rectal vault CT of the head was without acute findings chest x-ray also negative for any infiltrate effusion or pulmonary edema.  EKG shows sinus rhythm at a rate of 66 bpm no ST change elevation nonischemic EKG, normal intervals. Lactate negative. Manual disimpaction in ED @1705- report is remaining stool is soft.     5/3/2024: No acute events overnight. Vitals stable, no further hypotension, /81 this morning. Labs largely unremarkable.  No leukocytosis. Does have some anemia, suspect element of hemodilution. She denies any acute complaints,s he is actually oriented x2-2.5  5/4: Patient was seen and examined.  She is awake and alert and oriented x 3.  Blood pressure remained stable.  PT/OT yet to see.  Blood cultures are negative.  Urine cultures are pending.  Continue to trend CBC and BMP daily.  5/5: Patient was seen and examined.  Continues to be awake alert and interactive.  Seen by PT OT who recommend extended-care facility on discharge.  Blood cultures are  positive for Enterococcus faecium awaiting sensitivities; blood cultures remain negative.  Continue to trend CBC and BMP daily.  5/6: Patient was seen and examined.  Continues to be awake alert and interactive.  Enterococcus faecium in urine sensitive to vancomycin and Macrobid.  Started on IV vancomycin.  Likely switch to p.o. Macrobid on discharge.  Blood cultures remain negative.  Awaiting authorization for placement in an extended care facility.       Review of Systems   Constitutional:  Negative for appetite change, chills, diaphoresis, fatigue and fever.   HENT:  Negative for congestion, ear pain, facial swelling, hearing loss, nosebleeds, sore throat, tinnitus and trouble swallowing.    Eyes:  Negative for pain.   Respiratory:  Negative for cough, chest tightness, shortness of breath and wheezing.    Cardiovascular:  Negative for chest pain, palpitations and leg swelling.   Gastrointestinal:  Negative for abdominal pain, blood in stool, constipation, diarrhea, nausea and vomiting.   Genitourinary:  Negative for dysuria, flank pain, frequency, hematuria and urgency.   Musculoskeletal:  Negative for back pain and joint swelling.   Skin:  Negative for rash and wound.   Neurological:  Negative for dizziness, syncope, weakness, light-headedness, numbness and headaches.   Hematological:  Does not bruise/bleed easily.   Psychiatric/Behavioral:  Negative for behavioral problems, hallucinations and suicidal ideas.           Objective     Last Recorded Vitals  /81 (BP Location: Right arm, Patient Position: Lying)   Pulse 78   Temp 35.7 °C (96.2 °F) (Temporal)   Resp 18   Wt 81.6 kg (180 lb)   SpO2 94%     Image Results  CT abdomen pelvis w IV contrast    Result Date: 5/2/2024  STUDY: CT Abdomen and Pelvis with IV Contrast; 05/02/2024, 3:59 PM. INDICATION: Generalized abdominal pain. COMPARISON: CT AP: 09/28/23. ACCESSION NUMBER(S): [id number] ORDERING CLINICIAN: CELENA JONAS TECHNIQUE: CT of the  abdomen and pelvis was performed.  Contiguous axial images were obtained at 3 mm slice thickness through the abdomen and pelvis. Coronal and sagittal reconstructions at 3 mm slice thickness were performed.  Omnipaque 350 75 mL was administered intravenously.  FINDINGS: LOWER CHEST: No cardiomegaly.  No pericardial effusion.  Linear bibasilar areas of atelectasis  ABDOMEN:  LIVER: No hepatomegaly.  Smooth surface contour.  Mild fatty infiltration of the liver.  Stable small hepatic cysts  BILE DUCTS: No intrahepatic or extrahepatic biliary ductal dilatation.  GALLBLADDER: The gallbladder is present without gallstones. STOMACH: Moderate size hiatal hernia.  PANCREAS: Pancreatic parenchymal calcifications  SPLEEN: No splenomegaly or focal splenic lesion.  ADRENAL GLANDS: No thickening or nodules.  KIDNEYS AND URETERS: Kidneys are normal in size and location.  No renal or ureteral calculi.  PELVIS:  BLADDER: No abnormalities identified.  REPRODUCTIVE ORGANS: No abnormalities identified.  BOWEL: Appendix is normal.  Large amount of formed stool in the rectal vault measuring up to 8.3 cm in diameter with mild perirectal fat stranding.  Moderate stool throughout the remainder of the colon.  VESSELS: No abnormalities identified.  Abdominal aorta is normal in caliber. Moderate to severe plaque along the distal aorta.  PERITONEUM/RETROPERITONEUM/LYMPH NODES: No free fluid.  No pneumoperitoneum. No lymphadenopathy.  ABDOMINAL WALL: No abnormalities identified. SOFT TISSUES: No abnormalities identified.  BONES: No acute fracture or aggressive osseous lesion.  Bilateral total hip arthroplasty.  Scoliosis and degenerative change of the lumbar spine.    1. Large amount of formed stool in the rectal vault measuring up to 8.3 cm in diameter with mild perirectal fat stranding. Moderate stool throughout the remainder of the colon. Findings suggestive of fecal impaction with mild associated stercoral proctitis. 2. Mild hepatic  steatosis. 3. Moderate size hiatal hernia. 4. Findings of chronic pancreatitis. Signed by Frankie Galindo MD    CT head wo IV contrast    Result Date: 5/2/2024  Interpreted By:  Joesph Ramírez, STUDY: CT HEAD WO IV CONTRAST;  5/2/2024 3:55 pm   INDICATION: Altered mental status.   COMPARISON: None.   ACCESSION NUMBER(S): [id number]   ORDERING CLINICIAN: CELENA OJNAS   TECHNIQUE: Noncontrast axial CT scan of head was performed.   FINDINGS: Parenchyma: There is no intracranial hemorrhage. The grey-white differentiation is intact. There is no mass effect or midline shift. Patchy supratentorial hypodensity, nonspecific, but likely secondary to mild chronic microvascular ischemia.   CSF Spaces: Mild generalized volume loss with concordant ventriculomegaly.   Extra-Axial Fluid: There is no extraaxial fluid collection.   Calvarium: The calvarium is unremarkable.   Paranasal sinuses: Visualized paranasal sinuses are clear.   Mastoids: Clear.   Orbits: Normal.   Soft tissues: Unremarkable.       No acute intracranial hemorrhage, mass effect, or CT apparent acute infarct. Chronic microvascular ischemic and involutional changes.   Signed by: Joesph Ramírez 5/2/2024 4:13 PM Dictation workstation:   [id number]    XR chest 2 views    Result Date: 5/2/2024  STUDY: Chest Radiographs;  5/2/2024 at 14:26 INDICATION: Lethargy, cough. COMPARISON: XR Chest 10/2/23. ACCESSION NUMBER(S): [id number] ORDERING CLINICIAN: CELENA JONAS TECHNIQUE:  Frontal and lateral chest. FINDINGS: CARDIOMEDIASTINAL SILHOUETTE: Cardiomediastinal silhouette is normal in size and configuration.  LUNGS: There is elevation the left hemidiaphragm.  ABDOMEN: No remarkable upper abdominal findings.  BONES: There are degenerative change with joint space loss osteophytes and loose bodies involving both shoulders.    No evidence consolidating infiltrate or effusion. Signed by Christopher Villalta MD    ECG 12 lead    Result Date: 5/2/2024  Sinus rhythm  Low voltage, precordial leads       Lab Results  Results for orders placed or performed during the hospital encounter of 05/02/24 (from the past 24 hour(s))   CBC   Result Value Ref Range    WBC 6.0 4.4 - 11.3 x10*3/uL    nRBC 0.0 0.0 - 0.0 /100 WBCs    RBC 3.23 (L) 4.00 - 5.20 x10*6/uL    Hemoglobin 10.5 (L) 12.0 - 16.0 g/dL    Hematocrit 30.7 (L) 36.0 - 46.0 %    MCV 95 80 - 100 fL    MCH 32.5 26.0 - 34.0 pg    MCHC 34.2 32.0 - 36.0 g/dL    RDW 13.7 11.5 - 14.5 %    Platelets 221 150 - 450 x10*3/uL   Basic Metabolic Panel   Result Value Ref Range    Glucose 94 74 - 99 mg/dL    Sodium 132 (L) 136 - 145 mmol/L    Potassium 3.8 3.5 - 5.3 mmol/L    Chloride 102 98 - 107 mmol/L    Bicarbonate 28 21 - 32 mmol/L    Anion Gap <7 (L) 10 - 20 mmol/L    Urea Nitrogen 10 6 - 23 mg/dL    Creatinine 0.38 (L) 0.50 - 1.05 mg/dL    eGFR >90 >60 mL/min/1.73m*2    Calcium 9.0 8.6 - 10.3 mg/dL   Magnesium   Result Value Ref Range    Magnesium 1.61 1.60 - 2.40 mg/dL        Medications  Scheduled medications:  ARIPiprazole, 2.5 mg, oral, Daily  aspirin, 81 mg, oral, Daily  brimonidine, 1 drop, Both Eyes, BID  cefTRIAXone, 1 g, intravenous, q24h  divalproex sprinkle, 125 mg, oral, BID  enoxaparin, 40 mg, subcutaneous, q24h  polyethylene glycol, 17 g, oral, Daily  propranolol, 20 mg, oral, BID  timolol, 1 drop, Both Eyes, Daily  traZODone, 25 mg, oral, Nightly  vancomycin, 1,250 mg, intravenous, q12h      Continuous medications:  lactated Ringer's, 100 mL/hr, Last Rate: 100 mL/hr (05/05/24 2018)      PRN medications:  PRN medications: alum-mag hydroxide-simeth, ondansetron, vancomycin     Physical Exam  Constitutional:       General: She is not in acute distress.     Appearance: Normal appearance.      Comments: Elderly female, siting upright in bed   HENT:      Head: Normocephalic and atraumatic.      Right Ear: External ear normal.      Left Ear: External ear normal.      Nose: Nose normal.      Mouth/Throat:      Mouth: Mucous  membranes are moist.      Pharynx: Oropharynx is clear.   Eyes:      Extraocular Movements: Extraocular movements intact.      Conjunctiva/sclera: Conjunctivae normal.      Pupils: Pupils are equal, round, and reactive to light.   Cardiovascular:      Rate and Rhythm: Normal rate and regular rhythm.      Pulses: Normal pulses.      Heart sounds: Normal heart sounds.   Pulmonary:      Effort: Pulmonary effort is normal. No respiratory distress.      Breath sounds: Normal breath sounds. No wheezing, rhonchi or rales.   Abdominal:      General: Bowel sounds are normal.      Palpations: Abdomen is soft.      Tenderness: There is no abdominal tenderness. There is no right CVA tenderness, left CVA tenderness, guarding or rebound.   Genitourinary:     Comments: Samson with clear yellow urine  Musculoskeletal:         General: No swelling. Normal range of motion.      Cervical back: Normal range of motion and neck supple.   Skin:     General: Skin is warm and dry.      Capillary Refill: Capillary refill takes less than 2 seconds.      Findings: Lesion present. No rash.      Comments: Left lower leg wound   Neurological:      General: No focal deficit present.      Mental Status: She is alert. Mental status is at baseline.      Comments: Slight tremor   Psychiatric:         Mood and Affect: Mood normal.         Behavior: Behavior normal.                  Code Status  Full Code     Assessment/Plan      Enterococcus faecium bacteriuria and complicated CAUTI, POA  Continue patient on IV Rocephin   Blood cultures are negative x 1 day  Urine cultures are still pending  Patient unable to elaborate exactly why she has catheter in place, appears to be related to chronic incontinence and wounds  Will request wound care consultation     Hypotension unclear if related to sepsis or poor intake  Patient had good response to IV fluids in emergency department  Continue supplemental IV fluids for now  Monitor blood pressure closely  Avoid  hypotensive medications- reintroduce home meds as appropriate      Slow transit constipation  Patient appeared to have fecal impaction on diagnostic imaging  Patient was partially disimpacted from the ER report is remaining stool is soft  We will continue patient on MiraLAX     Major depressive disorder with history of psychosis  Continue home medications    Left lower leg wound  Wound care consult appreciated  Wound care recommends follow up in [facility] wound [facility] for this    DVT ppx: Lovenox     Spent 35 minutes in the follow-up management of this patient today.  Please see orders for more complete plan    Solo Sage MD

## 2024-05-06 NOTE — PROGRESS NOTES
Insurance has an intent to deny and offering P2P by calling [phone] option 4 by 420pm today, Notified Dr. Sage.

## 2024-05-06 NOTE — CARE PLAN
The patient's goals for the shift include      The clinical goals for the shift include pt will remain free from fall or injury    Over the shift, the patient did not make progress toward the following goals. Barriers to progression include . Recommendations to address these barriers include   Problem: Safety - Adult  Goal: Free from fall injury  Outcome: Progressing     Problem: Discharge Planning  Goal: Discharge to home or other facility with appropriate resources  Outcome: Progressing     Problem: Chronic Conditions and Co-morbidities  Goal: Patient's chronic conditions and co-morbidity symptoms are monitored and maintained or improved  Outcome: Progressing     Problem: Skin  Goal: Decreased wound size/increased tissue granulation at next dressing change  Outcome: Progressing  Goal: Participates in plan/prevention/treatment measures  Outcome: Progressing  Goal: Prevent/manage excess moisture  Outcome: Progressing  Goal: Prevent/minimize sheer/friction injuries  Outcome: Progressing  Flowsheets (Taken 5/6/2024 0433)  Prevent/minimize sheer/friction injuries:   HOB 30 degrees or less   Turn/reposition every 2 hours/use positioning/transfer devices  Goal: Promote/optimize nutrition  Outcome: Progressing  Goal: Promote skin healing  Outcome: Progressing     Problem: Nutrition  Goal: Oral intake greater 75%  Outcome: Progressing  Goal: Consume prescribed supplement  Outcome: Progressing  Goal: Promote healing  Outcome: Progressing     Problem: Fall/Injury  Goal: Not fall by end of shift  Outcome: Progressing  Goal: Be free from injury by end of the shift  Outcome: Progressing  Goal: Verbalize understanding of personal risk factors for fall in the hospital  Outcome: Progressing  Goal: Verbalize understanding of risk factor reduction measures to prevent injury from fall in the home  Outcome: Progressing  Goal: Use assistive devices by end of the shift  Outcome: Progressing  Goal: Pace activities to prevent fatigue by  end of the shift  Outcome: Progressing   .

## 2024-05-06 NOTE — PROGRESS NOTES
"Vancomycin Dosing by Pharmacy- INITIAL CONSULT NOTE    Qi Davila is a 83 y.o. year old female who Pharmacy has been consulted for vancomycin dosing for Vancomycin Indications: Urinary Tract Infection. Based on the patient's indication and renal status this patient will be dosed based on a goal AUC of 400-600.     Renal function is currently stable.    Visit Vitals  /81 (BP Location: Right arm, Patient Position: Lying)   Pulse 78   Temp 35.7 °C (96.2 °F) (Temporal)   Resp 18           Lab Results   Component Value Date    CREATININE 0.38 (L) 05/06/2024    CREATININE 0.41 (L) 05/05/2024    CREATININE 0.31 (L) 05/04/2024    CREATININE 0.45 (L) 05/03/2024       Patient weight is No results found for: \"PTWEIGHT\"    No results found for: \"CULTURE\"    I/O last 3 completed shifts:  In: 751.7 (9.2 mL/kg) [P.O.:420; I.V.:331.7 (4.1 mL/kg)]  Out: 5850 (71.7 mL/kg) [Urine:5850 (2 mL/kg/hr)]  Weight: 81.6 kg     Lab Results   Component Value Date    PATIENTTEMP 37.0 05/02/2024          Assessment/Plan     Patient will not be given a loading dose.  Will initiate vancomycin maintenance,  1250 mg every 12 hours.    This dosing regimen is predicted by [employer] to result in the following pharmacokinetic parameters:  Loading dose: N/A  Regimen: 1250 mg IV every 12 hours.  Start time: 08:48 on 05/06/2024  Exposure target: AUC24 (range)400-600 mg/L.hr   AUC24,ss: 488 mg/L.hr  Probability of AUC24 > 400: 71 %  Ctrough,ss: 14.3 mg/L  Probability of Ctrough,ss > 20: 25 %  Probability of nephrotoxicity (Lodise ATUL 2009): 9 %    Follow-up level will be ordered on 5/7 at 0500 unless clinically indicated sooner.  Will continue to monitor renal function daily while on vancomycin and order serum creatinine at least every 48 hours if not already ordered.  Follow for continued vancomycin needs, clinical response, and signs/symptoms of toxicity.       Sulaiman EscobarD, BCPS      "

## 2024-05-06 NOTE — PROGRESS NOTES
Physical Therapy    Physical Therapy Treatment    Patient Name: Qi Davila  MRN: [id number]  Today's Date: 5/6/2024  Time Calculation  Start Time: 0853  Stop Time: 0921  Time Calculation (min): 28 min       Assessment/Plan   PT Assessment  End of Session Communication: Bedside nurse  Assessment Comment: pt was able to perform transfer this session. pt with MAX Ax 2 with all mobility and MAX cues. She would benefit from further skilled PT services  End of Session Patient Position: Bed, 3 rail up, Alarm on     PT Plan  Treatment/Interventions: Bed mobility, Transfer training, Balance training, Strengthening, Endurance training, Range of motion, Therapeutic exercise, Therapeutic activity, Positioning, Wheelchair management  PT Plan: Skilled PT  PT Frequency: 3 times per week  PT Discharge Recommendations: Moderate intensity level of continued care  PT Recommended Transfer Status: Total assist, Assist x2  PT - OK to Discharge: Yes      General Visit Information:   PT  Visit  PT Received On: 05/06/24  Response to Previous Treatment: Patient with no complaints from previous session.  General  Caregiver Feedback: yes  Co-Treatment: OT  Co-Treatment Reason: co eval performed to maximize pt safety due to pt being at high risk for falls  Prior to Session Communication: Bedside nurse  Patient Position Received: Bed, 3 rail up, Alarm on  Preferred Learning Style: verbal  General Comment: pt in bed and agreeable to treatment. pt reporting she doesn't walk prior to admission.      Precautions:  Precautions  Medical Precautions: Fall precautions  Precautions Comment: memory loss, chronic wounds, de la garza         Pain:  Pain Assessment  Pain Assessment: 0-10  Pain Score:  (c/o discomfort in LEs with mobilty)  Cognition:  Cognition  Orientation Level: Disoriented to place, Disoriented to time, Disoriented to situation  Postural Control:  Static Sitting Balance  Static Sitting-Balance Support: Feet supported  Static Sitting-Level of  Assistance: Minimum assistance  Static Sitting-Comment/Number of Minutes: pt required MAX cues to keep feet on floor and correct retro lean. pt sat EOB 10 minutes.       Treatments:  Bed Mobility  Bed Mobility: Yes  Bed Mobility 1  Bed Mobility 1: Supine to sitting, Sitting to supine  Level of Assistance 1: Maximum assistance (AX 2)  Bed Mobility Comments 1: pt required cues to assist with use of bed rails and extremities  Bed Mobility 2  Bed Mobility  2: Rolling right, Rolling left  Level of Assistance 2: Maximum verbal cues, Maximum assistance (AX2)  Bed Mobility Comments 2: cues ro assist with UEs, LEs and bed rail       Transfers  Transfer: Yes  Transfer 1  Technique 1: Sit to stand, Stand to sit  Transfer Level of Assistance 1: Maximum assistance, +2, Maximum verbal cues  Trials/Comments 1: cues for safety and technique. pt with poor foot placement and technique.    Outcome Measures:  [facility] Basic Mobility  Turning from your back to your side while in a flat bed without using bedrails: A lot  Moving from lying on your back to sitting on the side of a flat bed without using bedrails: A lot  Moving to and from bed to chair (including a wheelchair): Total  Standing up from a chair using your arms (e.g. wheelchair or bedside chair): A lot  To walk in hospital room: Total  Climbing 3-5 steps with railing: Total  Basic Mobility - Total Score: 9    Education Documentation  Body Mechanics, taught by Alecia Mota PTA at 5/6/2024  9:51 AM.  Learner: Patient  Readiness: Acceptance  Method: Explanation  Response: Verbalizes Understanding, Needs Reinforcement    Home Exercise Program, taught by Alecia Mota PTA at 5/6/2024  9:51 AM.  Learner: Patient  Readiness: Acceptance  Method: Explanation  Response: Verbalizes Understanding, Needs Reinforcement    Precautions, taught by Alecia Mota PTA at 5/6/2024  9:51 AM.  Learner: Patient  Readiness: Acceptance  Method: Explanation  Response: Verbalizes Understanding, Needs  Reinforcement    Mobility Training, taught by Alecia Mota PTA at 5/6/2024  9:51 AM.  Learner: Patient  Readiness: Acceptance  Method: Explanation  Response: Verbalizes Understanding, Needs Reinforcement    Education Comments  No comments found.             Encounter Problems       Encounter Problems (Active)       PT Transfers       STG - Transfer from bed to chair with max A x1 using LRD (Progressing)       Start:  05/04/24    Expected End:  05/07/24            STG - Patient will perform bed mobility with min a x1 (Progressing)       Start:  05/04/24    Expected End:  05/07/24            STG - Patient will transfer sit to and from stand using LRD with mod A x1 (Progressing)       Start:  05/04/24    Expected End:  05/07/24

## 2024-05-06 NOTE — PROGRESS NOTES
Occupational Therapy    OT Treatment    Patient Name: Qi Davila  MRN: [id number]  Today's Date: 5/6/2024  Time Calculation  Start Time: 0854  Stop Time: 0921  Time Calculation (min): 27 min         Assessment:  End of Session Communication: Bedside nurse  End of Session Patient Position: Bed, 3 rail up, Alarm on     Plan:  Treatment Interventions: ADL retraining, Endurance training, UE strengthening/ROM, Functional transfer training      Subjective   Previous Visit Info:  OT Last Visit  OT Received On: 05/06/24  General:  General  Co-Treatment: PT  Co-Treatment Reason: for safety with functional mobility  Prior to Session Communication: Bedside nurse  General Comment: Pt. MAX A x2 for bed moblity attempted to stand x2 able to clear buttocks from bed MAX A x2  Tolerated 10 mins EOB  Precautions:  STEADI Fall Risk Score (The score of 4 or more indicates an increased risk of falling): a       Pain:  Pain Assessment  Pain Assessment: 0-10  Pain Score:  (did not rate but c/o pain with light touch on LE's)    Objective    Cognition:  Cognition  Overall Cognitive Status: Impaired  Orientation Level: Disoriented to place, Disoriented to situation, Disoriented to time  Coordination:     Activities of Daily Living:      Grooming  Grooming Level of Assistance: Minimum assistance  Grooming Where Assessed: Edge of bed    UE Bathing  UE Bathing Level of Assistance: Moderate assistance  UE Bathing Where Assessed: Edge of bed    LE Bathing  LE Bathing Level of Assistance: Moderate assistance  LE Bathing Where Assessed: Edge of bed    UE Dressing  UE Dressing Level of Assistance: Moderate assistance  UE Dressing Where Assessed: Edge of bed    LE Dressing  LE Dressing: Yes  Sock Level of Assistance: Maximum assistance  LE Dressing Where Assessed: Bed level         Bed Mobility/Transfers: Bed Mobility  Bed Mobility: Yes  Bed Mobility 1  Bed Mobility 1: Rolling left  Level of Assistance 1: Maximum assistance, Moderate verbal cues,  Minimal tactile cues (x2)  Bed Mobility Comments 1: cueing for hand placement  Bed Mobility 2  Bed Mobility  2: Supine to sitting  Level of Assistance 2: Maximum assistance, Moderate tactile cues, Moderate verbal cues (x2)    Transfers  Transfer: Yes  Transfer 1  Transfer From 1: Bed to  Transfer to 1: Stand  Technique 1: Sit to stand, Stand to sit  Transfer Device 1: Walker  Transfer Level of Assistance 1: Maximum assistance, +2, Moderate verbal cues, Moderate tactile cues  Transfers 2  Transfer From 2: Sit to  Transfer to 2: Stand  Technique 2: Sit to stand, Stand to sit  Transfer Device 2: Walker  Transfer Level of Assistance 2: Maximum assistance, +2, Maximum verbal cues, Maximum tactile cues           Sitting Balance:  Static Sitting Balance  Static Sitting-Balance Support: Bilateral upper extremity supported, Feet supported  Static Sitting-Level of Assistance: Minimum assistance  Static Sitting-Comment/Number of Minutes: 10 mins EOB while perform ADLS            Outcome Measures:[facility] Daily Activity  Putting on and taking off regular lower body clothing: A lot  Bathing (including washing, rinsing, drying): A lot  Putting on and taking off regular upper body clothing: A lot  Toileting, which includes using toilet, bedpan or urinal: A lot  Taking care of personal grooming such as brushing teeth: A little  Eating Meals: A little  Daily Activity - Total Score: 14        Education Documentation  Body Mechanics, taught by MIKE Shoemaker at 5/6/2024  9:47 AM.  Learner: Patient  Readiness: Acceptance  Method: Explanation  Response: Verbalizes Understanding, Needs Reinforcement    Precautions, taught by MIKE Shoemaker at 5/6/2024  9:47 AM.  Learner: Patient  Readiness: Acceptance  Method: Explanation  Response: Verbalizes Understanding, Needs Reinforcement    ADL Training, taught by MIKE Shoemaker at 5/6/2024  9:47 AM.  Learner: Patient  Readiness: Acceptance  Method:  Explanation  Response: Verbalizes Understanding, Needs Reinforcement    Education Comments  No comments found.        OP EDUCATION:       Goals:  Encounter Problems       Encounter Problems (Active)       ADLs       Patient will perform UB and LB bathing with moderate assist level of assistance and PRN DME/AE. (Progressing)       Start:  05/04/24    Expected End:  05/07/24            Patient with complete upper body dressing with set-up level of assistance donning and doffing all UE clothes with PRN adaptive equipment (Progressing)       Start:  05/04/24    Expected End:  05/07/24            Patient with complete lower body dressing with moderate assist level of assistance donning and doffing all LE clothes  with PRN adaptive equipment (Progressing)       Start:  05/04/24    Expected End:  05/07/24            Patient will feed self with modified independent level of assistance and  using PRN adaptive equipment. (Progressing)       Start:  05/04/24    Expected End:  05/07/24            Patient will complete daily grooming tasks brushing teeth and washing face/hair with set-up level of assistance and PRN adaptive equipment. (Progressing)       Start:  05/04/24    Expected End:  05/07/24            Patient will complete toileting including hygiene clothing management/hygiene with moderate assist level of assistance and PRN DME/AE. (Progressing)       Start:  05/04/24    Expected End:  05/07/24

## 2024-05-07 LAB
ANION GAP SERPL CALC-SCNC: <7 MMOL/L (ref 10–20)
BACTERIA BLD CULT: NORMAL
BACTERIA BLD CULT: NORMAL
BUN SERPL-MCNC: 13 MG/DL (ref 6–23)
CALCIUM SERPL-MCNC: 8.8 MG/DL (ref 8.6–10.3)
CHLORIDE SERPL-SCNC: 101 MMOL/L (ref 98–107)
CO2 SERPL-SCNC: 29 MMOL/L (ref 21–32)
CREAT SERPL-MCNC: 0.35 MG/DL (ref 0.5–1.05)
EGFRCR SERPLBLD CKD-EPI 2021: >90 ML/MIN/1.73M*2
ERYTHROCYTE [DISTWIDTH] IN BLOOD BY AUTOMATED COUNT: 13.8 % (ref 11.5–14.5)
GLUCOSE SERPL-MCNC: 87 MG/DL (ref 74–99)
HCT VFR BLD AUTO: 30.1 % (ref 36–46)
HGB BLD-MCNC: 9.9 G/DL (ref 12–16)
MAGNESIUM SERPL-MCNC: 1.6 MG/DL (ref 1.6–2.4)
MCH RBC QN AUTO: 31.7 PG (ref 26–34)
MCHC RBC AUTO-ENTMCNC: 32.9 G/DL (ref 32–36)
MCV RBC AUTO: 97 FL (ref 80–100)
NRBC BLD-RTO: 0 /100 WBCS (ref 0–0)
PLATELET # BLD AUTO: 229 X10*3/UL (ref 150–450)
POTASSIUM SERPL-SCNC: 3.3 MMOL/L (ref 3.5–5.3)
RBC # BLD AUTO: 3.12 X10*6/UL (ref 4–5.2)
SODIUM SERPL-SCNC: 131 MMOL/L (ref 136–145)
VANCOMYCIN SERPL-MCNC: 18.4 UG/ML (ref 5–20)
WBC # BLD AUTO: 7.2 X10*3/UL (ref 4.4–11.3)

## 2024-05-07 PROCEDURE — 97535 SELF CARE MNGMENT TRAINING: CPT | Mod: GO,CO

## 2024-05-07 PROCEDURE — 2500000006 HC RX 250 W HCPCS SELF ADMINISTERED DRUGS (ALT 637 FOR ALL PAYERS): Performed by: INTERNAL MEDICINE

## 2024-05-07 PROCEDURE — 2500000001 HC RX 250 WO HCPCS SELF ADMINISTERED DRUGS (ALT 637 FOR MEDICARE OP): Performed by: NURSE PRACTITIONER

## 2024-05-07 PROCEDURE — 97530 THERAPEUTIC ACTIVITIES: CPT | Mod: GP,CQ | Performed by: PHYSICAL THERAPY ASSISTANT

## 2024-05-07 PROCEDURE — 83735 ASSAY OF MAGNESIUM: CPT | Performed by: PHYSICIAN ASSISTANT

## 2024-05-07 PROCEDURE — 80048 BASIC METABOLIC PNL TOTAL CA: CPT | Performed by: PHYSICIAN ASSISTANT

## 2024-05-07 PROCEDURE — 97112 NEUROMUSCULAR REEDUCATION: CPT | Mod: GP,CQ | Performed by: PHYSICAL THERAPY ASSISTANT

## 2024-05-07 PROCEDURE — 2500000004 HC RX 250 GENERAL PHARMACY W/ HCPCS (ALT 636 FOR OP/ED): Performed by: NURSE PRACTITIONER

## 2024-05-07 PROCEDURE — 99233 SBSQ HOSP IP/OBS HIGH 50: CPT | Performed by: INTERNAL MEDICINE

## 2024-05-07 PROCEDURE — 2500000001 HC RX 250 WO HCPCS SELF ADMINISTERED DRUGS (ALT 637 FOR MEDICARE OP): Performed by: PHYSICIAN ASSISTANT

## 2024-05-07 PROCEDURE — 85027 COMPLETE CBC AUTOMATED: CPT | Performed by: PHYSICIAN ASSISTANT

## 2024-05-07 PROCEDURE — 2500000004 HC RX 250 GENERAL PHARMACY W/ HCPCS (ALT 636 FOR OP/ED)

## 2024-05-07 PROCEDURE — 1210000001 HC SEMI-PRIVATE ROOM DAILY

## 2024-05-07 PROCEDURE — 2500000001 HC RX 250 WO HCPCS SELF ADMINISTERED DRUGS (ALT 637 FOR MEDICARE OP): Performed by: INTERNAL MEDICINE

## 2024-05-07 PROCEDURE — 80202 ASSAY OF VANCOMYCIN: CPT

## 2024-05-07 RX ORDER — VANCOMYCIN HYDROCHLORIDE 1 G/200ML
1000 INJECTION, SOLUTION INTRAVENOUS EVERY 12 HOURS
Status: DISCONTINUED | OUTPATIENT
Start: 2024-05-07 | End: 2024-05-09 | Stop reason: HOSPADM

## 2024-05-07 RX ORDER — POTASSIUM CHLORIDE 750 MG/1
40 TABLET, FILM COATED, EXTENDED RELEASE ORAL 2 TIMES DAILY
Status: DISPENSED | OUTPATIENT
Start: 2024-05-07 | End: 2024-05-08

## 2024-05-07 RX ADMIN — PROPRANOLOL HYDROCHLORIDE 20 MG: 10 TABLET ORAL at 09:33

## 2024-05-07 RX ADMIN — VANCOMYCIN HYDROCHLORIDE 1000 MG: 1 INJECTION, SOLUTION INTRAVENOUS at 21:46

## 2024-05-07 RX ADMIN — SODIUM CHLORIDE, POTASSIUM CHLORIDE, SODIUM LACTATE AND CALCIUM CHLORIDE 100 ML/HR: 600; 310; 30; 20 INJECTION, SOLUTION INTRAVENOUS at 21:58

## 2024-05-07 RX ADMIN — BRIMONIDINE TARTRATE 1 DROP: 2 SOLUTION/ DROPS OPHTHALMIC at 21:46

## 2024-05-07 RX ADMIN — TIMOLOL MALEATE 1 DROP: 5 SOLUTION/ DROPS OPHTHALMIC at 09:34

## 2024-05-07 RX ADMIN — PROPRANOLOL HYDROCHLORIDE 20 MG: 10 TABLET ORAL at 21:46

## 2024-05-07 RX ADMIN — DIVALPROEX SODIUM 125 MG: 125 CAPSULE, COATED PELLETS ORAL at 09:33

## 2024-05-07 RX ADMIN — CEFTRIAXONE SODIUM 1 G: 1 INJECTION, SOLUTION INTRAVENOUS at 17:35

## 2024-05-07 RX ADMIN — POLYETHYLENE GLYCOL 3350 17 G: 17 POWDER, FOR SOLUTION ORAL at 09:34

## 2024-05-07 RX ADMIN — TRAZODONE HYDROCHLORIDE 25 MG: 50 TABLET ORAL at 21:45

## 2024-05-07 RX ADMIN — POTASSIUM CHLORIDE 20 MEQ: 1500 TABLET, EXTENDED RELEASE ORAL at 09:33

## 2024-05-07 RX ADMIN — ASPIRIN 81 MG CHEWABLE TABLET 81 MG: 81 TABLET CHEWABLE at 09:34

## 2024-05-07 RX ADMIN — ENOXAPARIN SODIUM 40 MG: 40 INJECTION SUBCUTANEOUS at 20:12

## 2024-05-07 RX ADMIN — ARIPIPRAZOLE 2.5 MG: 5 TABLET ORAL at 09:34

## 2024-05-07 RX ADMIN — TAMSULOSIN HYDROCHLORIDE 0.4 MG: 0.4 CAPSULE ORAL at 09:33

## 2024-05-07 RX ADMIN — POTASSIUM CHLORIDE 40 MEQ: 750 TABLET, FILM COATED, EXTENDED RELEASE ORAL at 17:33

## 2024-05-07 RX ADMIN — FUROSEMIDE 20 MG: 20 TABLET ORAL at 09:34

## 2024-05-07 RX ADMIN — DIVALPROEX SODIUM 125 MG: 125 CAPSULE, COATED PELLETS ORAL at 21:45

## 2024-05-07 RX ADMIN — BRIMONIDINE TARTRATE 1 DROP: 2 SOLUTION/ DROPS OPHTHALMIC at 09:34

## 2024-05-07 RX ADMIN — SODIUM CHLORIDE, POTASSIUM CHLORIDE, SODIUM LACTATE AND CALCIUM CHLORIDE 100 ML/HR: 600; 310; 30; 20 INJECTION, SOLUTION INTRAVENOUS at 06:30

## 2024-05-07 RX ADMIN — VANCOMYCIN HYDROCHLORIDE 1000 MG: 1 INJECTION, SOLUTION INTRAVENOUS at 09:34

## 2024-05-07 ASSESSMENT — COGNITIVE AND FUNCTIONAL STATUS - GENERAL
HELP NEEDED FOR BATHING: A LOT
MOVING TO AND FROM BED TO CHAIR: A LOT
MOVING TO AND FROM BED TO CHAIR: TOTAL
DAILY ACTIVITIY SCORE: 14
TURNING FROM BACK TO SIDE WHILE IN FLAT BAD: A LOT
DRESSING REGULAR LOWER BODY CLOTHING: A LOT
EATING MEALS: A LITTLE
PERSONAL GROOMING: A LOT
CLIMB 3 TO 5 STEPS WITH RAILING: TOTAL
MOVING FROM LYING ON BACK TO SITTING ON SIDE OF FLAT BED WITH BEDRAILS: A LOT
PERSONAL GROOMING: A LITTLE
WALKING IN HOSPITAL ROOM: TOTAL
HELP NEEDED FOR BATHING: A LOT
MOVING FROM LYING ON BACK TO SITTING ON SIDE OF FLAT BED WITH BEDRAILS: A LOT
DAILY ACTIVITIY SCORE: 13
DRESSING REGULAR LOWER BODY CLOTHING: A LOT
HELP NEEDED FOR BATHING: A LOT
TURNING FROM BACK TO SIDE WHILE IN FLAT BAD: A LOT
WALKING IN HOSPITAL ROOM: TOTAL
TOILETING: A LOT
TOILETING: A LOT
DRESSING REGULAR UPPER BODY CLOTHING: A LOT
EATING MEALS: A LITTLE
TOILETING: A LOT
TURNING FROM BACK TO SIDE WHILE IN FLAT BAD: A LOT
MOVING FROM LYING ON BACK TO SITTING ON SIDE OF FLAT BED WITH BEDRAILS: A LOT
STANDING UP FROM CHAIR USING ARMS: A LOT
MOBILITY SCORE: 9
DRESSING REGULAR LOWER BODY CLOTHING: A LOT
CLIMB 3 TO 5 STEPS WITH RAILING: TOTAL
MOBILITY SCORE: 10
DRESSING REGULAR UPPER BODY CLOTHING: A LOT
STANDING UP FROM CHAIR USING ARMS: A LOT
STANDING UP FROM CHAIR USING ARMS: A LOT
EATING MEALS: A LITTLE
DRESSING REGULAR UPPER BODY CLOTHING: A LOT
MOVING TO AND FROM BED TO CHAIR: TOTAL
PERSONAL GROOMING: A LITTLE
MOBILITY SCORE: 9
CLIMB 3 TO 5 STEPS WITH RAILING: TOTAL
WALKING IN HOSPITAL ROOM: TOTAL
DAILY ACTIVITIY SCORE: 14

## 2024-05-07 ASSESSMENT — PAIN SCALES - GENERAL
PAINLEVEL_OUTOF10: 0 - NO PAIN
PAINLEVEL_OUTOF10: 0 - NO PAIN

## 2024-05-07 ASSESSMENT — ENCOUNTER SYMPTOMS
SHORTNESS OF BREATH: 0
CHEST TIGHTNESS: 0
TROUBLE SWALLOWING: 0
VOMITING: 0
CONSTIPATION: 0
NUMBNESS: 0
BRUISES/BLEEDS EASILY: 0
SORE THROAT: 0
PALPITATIONS: 0
DIAPHORESIS: 0
FREQUENCY: 0
HEMATURIA: 0
WOUND: 0
ABDOMINAL PAIN: 0
CHILLS: 0
EYE PAIN: 0
FEVER: 0
WHEEZING: 0
BLOOD IN STOOL: 0
WEAKNESS: 0
BACK PAIN: 0
DIZZINESS: 0
APPETITE CHANGE: 0
JOINT SWELLING: 0
FLANK PAIN: 0
FATIGUE: 0
HALLUCINATIONS: 0
NAUSEA: 0
LIGHT-HEADEDNESS: 0
DYSURIA: 0
DIARRHEA: 0
COUGH: 0
HEADACHES: 0
FACIAL SWELLING: 0

## 2024-05-07 ASSESSMENT — PAIN SCALES - PAIN ASSESSMENT IN ADVANCED DEMENTIA (PAINAD)
CONSOLABILITY: NO NEED TO CONSOLE
TOTALSCORE: 0
BREATHING: NORMAL
BODYLANGUAGE: RELAXED
FACIALEXPRESSION: SMILING OR INEXPRESSIVE

## 2024-05-07 ASSESSMENT — PAIN - FUNCTIONAL ASSESSMENT
PAIN_FUNCTIONAL_ASSESSMENT: 0-10

## 2024-05-07 ASSESSMENT — ACTIVITIES OF DAILY LIVING (ADL): HOME_MANAGEMENT_TIME_ENTRY: 26

## 2024-05-07 NOTE — PROGRESS NOTES
SNF auth denied. Called [employer] to start appeal process. Spoke to Fide [phone] , Provided requested info. Expedited Appeal started. Will fax clinicals to [phone]. Fide states this appeal could take up to 72 hours. Updated patients daughter.

## 2024-05-07 NOTE — PROGRESS NOTES
Occupational Therapy    OT Treatment    Patient Name: Qi Davila  MRN: [id number]  Today's Date: 5/7/2024  Time Calculation  Start Time: 1316  Stop Time: 1342  Time Calculation (min): 26 min         Assessment:  End of Session Communication: Bedside nurse  End of Session Patient Position: Bed, 3 rail up, Alarm on  OT Assessment Results: Decreased ADL status, Decreased upper extremity strength, Decreased safe judgment during ADL, Decreased endurance, Decreased functional mobility, Decreased cognition  Plan:  Treatment Interventions: ADL retraining, Functional transfer training, UE strengthening/ROM  OT Frequency: 3 times per week  OT Discharge Recommendations: Low intensity level of continued care, Moderate intensity level of continued care  Equipment Recommended upon Discharge: Other (comment) (TBD)  OT - OK to Discharge: Yes  Treatment Interventions: ADL retraining, Functional transfer training, UE strengthening/ROM    Subjective   Previous Visit Info:     General:  General  Reason for Referral: impaired mobility  Referred By: jefferson  Prior to Session Communication: Bedside nurse  Patient Position Received: Bed, 3 rail up, Alarm on  Preferred Learning Style: verbal  General Comment: pt agreeable to OT kassy glez  Precautions:  Medical Precautions: Fall precautions  Precautions Comment: memory loss, chronic wounds, de la garza  Vital Signs:     Pain:  Pain Assessment  Pain Score:  (c/o back pain, did not quantify)    Objective    Cognition:  Cognition  Overall Cognitive Status: Impaired at baseline  Orientation Level: Disoriented to place, Disoriented to time, Disoriented to situation  Coordination:     Activities of Daily Living: Grooming  Grooming Level of Assistance: Minimum assistance  Grooming Where Assessed: Edge of bed  Grooming Comments: simples cues to initated face washing task, able to complete some of task however difficutly with thoroughness and termination of task    UE Dressing  UE Dressing Level of  Assistance: Moderate assistance  UE Dressing Where Assessed: Edge of bed  Functional Standing Tolerance:  Time: 10-15 seconds  Activity: pt completed x2 static stands with maxAx2. Pt unable to achieve full upright position and full hip extension at this time. Poor safety awareness  Bed Mobility/Transfers: Bed Mobility  Bed Mobility: Yes  Bed Mobility 1  Bed Mobility 1: Rolling left, Rolling right, Supine to sitting, Sitting to supine  Level of Assistance 1: Maximum assistance, Moderate verbal cues, Minimal tactile cues (x2)  Bed Mobility 3  Bed Mobility 3: Scooting  Level of Assistance 3: Maximum assistance, Moderate verbal cues (x2)    Transfers  Transfer: Yes  Transfer 1  Transfer From 1: Bed to  Transfer to 1: Stand  Technique 1: Sit to stand, Stand to sit  Transfer Level of Assistance 1: Arm in arm assistance, +2, Moderate verbal cues, Maximum assistance  Trials/Comments 1: x2    Sitting Balance:  Static Sitting Balance  Static Sitting-Balance Support: Bilateral upper extremity supported, Feet supported  Static Sitting-Level of Assistance: Minimum assistance  Static Sitting-Comment/Number of Minutes: pt achieved sitting EOB with cga-Kary d/t retroleaning      Outcome Measures:[facility] Daily Activity  Putting on and taking off regular lower body clothing: A lot  Bathing (including washing, rinsing, drying): A lot  Putting on and taking off regular upper body clothing: A lot  Toileting, which includes using toilet, bedpan or urinal: A lot  Taking care of personal grooming such as brushing teeth: A little  Eating Meals: A little  Daily Activity - Total Score: 14        Education Documentation  Body Mechanics, taught by MIKE Merino at 5/7/2024  2:13 PM.  Learner: Patient  Readiness: Acceptance  Method: Explanation  Response: Verbalizes Understanding    Precautions, taught by MIKE Merino at 5/7/2024  2:13 PM.  Learner: Patient  Readiness: Acceptance  Method: Explanation  Response: Verbalizes  Understanding    ADL Training, taught by MIKE Merino at 5/7/2024  2:13 PM.  Learner: Patient  Readiness: Acceptance  Method: Explanation  Response: Verbalizes Understanding    Education Comments  No comments found.        OP EDUCATION:       Goals:  Encounter Problems       Encounter Problems (Active)       ADLs       Patient will perform UB and LB bathing with moderate assist level of assistance and PRN DME/AE. (Progressing)       Start:  05/04/24    Expected End:  05/07/24            Patient with complete upper body dressing with set-up level of assistance donning and doffing all UE clothes with PRN adaptive equipment (Progressing)       Start:  05/04/24    Expected End:  05/07/24            Patient with complete lower body dressing with moderate assist level of assistance donning and doffing all LE clothes  with PRN adaptive equipment (Progressing)       Start:  05/04/24    Expected End:  05/07/24            Patient will feed self with modified independent level of assistance and  using PRN adaptive equipment. (Progressing)       Start:  05/04/24    Expected End:  05/07/24            Patient will complete daily grooming tasks brushing teeth and washing face/hair with set-up level of assistance and PRN adaptive equipment. (Progressing)       Start:  05/04/24    Expected End:  05/07/24            Patient will complete toileting including hygiene clothing management/hygiene with moderate assist level of assistance and PRN DME/AE. (Progressing)       Start:  05/04/24    Expected End:  05/07/24

## 2024-05-07 NOTE — PROGRESS NOTES
"Vancomycin Dosing by Pharmacy- FOLLOW UP    Qi Davila is a 83 y.o. year old female who Pharmacy has been consulted for vancomycin dosing for Vancomycin Indications: Urinary Tract Infection. Based on the patient's indication and renal status this patient will be dosed based on a goal AUC of 400-600.     Renal function is currently stable.    Current vancomycin dose:  1250 mg every 12 hours    Estimated vancomycin AUC on current dose: 625 mg/L.hr     Visit Vitals  /76 (BP Location: Right arm, Patient Position: Sitting)   Pulse 81   Temp 36.6 °C (97.8 °F) (Temporal)   Resp 14           Lab Results   Component Value Date    CREATININE 0.35 (L) 05/07/2024    CREATININE 0.38 (L) 05/06/2024    CREATININE 0.41 (L) 05/05/2024    CREATININE 0.31 (L) 05/04/2024       Patient weight is No results found for: \"PTWEIGHT\"    No results found for: \"CULTURE\"    I/O last 3 completed shifts:  In: 5291.7 (64.8 mL/kg) [P.O.:380; I.V.:4361.7 (53.4 mL/kg); IV Piggyback:550]  Out: 5650 (69.2 mL/kg) [Urine:5650 (1.9 mL/kg/hr)]  Weight: 81.6 kg     Lab Results   Component Value Date    PATIENTTEMP 37.0 05/02/2024          Assessment/Plan     Above goal AUC. Orders placed for new vancomcyin regimen of 1000 every 12 hours to begin at 0930.    This dosing regimen is predicted by [employer] to result in the following pharmacokinetic parameters:  Loading dose: N/A  Regimen: 1000 mg IV every 12 hours.  Start time: 04:32 on 05/07/2024  Exposure target: AUC24 (range)400-600 mg/L.hr   AUC24,ss: 501 mg/L.hr  Probability of AUC24 > 400: 90 %  Ctrough,ss: 14.6 mg/L  Probability of Ctrough,ss > 20: 11 %  Probability of nephrotoxicity (Lodise ATUL 2009): 10 %    The next level will be obtained on 5/10 at 0500. May be obtained sooner if clinically indicated.   Will continue to monitor renal function daily while on vancomycin and order serum creatinine at least every 48 hours if not already ordered.  Follow for continued vancomycin needs, clinical " response, and signs/symptoms of toxicity.     Sulaiman EscobarD, BCPS

## 2024-05-07 NOTE — PROGRESS NOTES
Physical Therapy    Physical Therapy Treatment    Patient Name: Qi Davila  MRN: [id number]  Today's Date: 5/7/2024  Time Calculation  Start Time: 1317  Stop Time: 1342  Time Calculation (min): 25 min       Assessment/Plan   PT Assessment  End of Session Communication: Bedside nurse  Assessment Comment: pt  with increased processing time with all cues. She is cooperative and would benefit from further skilled treatment.  End of Session Patient Position: Bed, 3 rail up, Alarm on     PT Plan  Treatment/Interventions: Bed mobility, Transfer training, Balance training, Strengthening, Endurance training, Range of motion, Therapeutic exercise, Therapeutic activity, Positioning, Wheelchair management  PT Plan: Skilled PT  PT Frequency: 3 times per week  PT Discharge Recommendations: Moderate intensity level of continued care  PT Recommended Transfer Status: Total assist, Assist x2  PT - OK to Discharge: Yes      General Visit Information:   PT  Visit  PT Received On: 05/07/24  Response to Previous Treatment: Patient with no complaints from previous session.  General  Reason for Referral: impaired mobility  Referred By: jefferson  Prior to Session Communication: Bedside nurse  Patient Position Received: Bed, 3 rail up, Alarm on  Preferred Learning Style: verbal  General Comment: pt agreeable to PT and cleared by RN.      Precautions:  Precautions  Medical Precautions: Fall precautions  Precautions Comment: memory loss, chronic wounds, de la garza    Pain:  Pain Assessment  Pain Assessment: 0-10  Pain Score:  (C/O back pain but did not rate.)  Cognition:  Cognition  Orientation Level: Disoriented to place, Disoriented to time, Disoriented to situation  Postural Control:  Static Sitting Balance  Static Sitting-Balance Support: Feet supported  Static Sitting-Level of Assistance: Minimum assistance  Static Sitting-Comment/Number of Minutes: pt sat EOB x 18 minutes with cues to keep feet on floor and correct retro lean pt is able to  self correct.  Static Standing Balance  Static Standing-Balance Support: Bilateral upper extremity supported  Static Standing-Level of Assistance: Maximum assistance (AX2)  Static Standing-Comment/Number of Minutes: Cues to correct flexed posture and lean to R and retro lean. pt keeps feet to far out in front of her. pt stood x 2 attempts 15 sec each.      Treatments:  Bed Mobility  Bed Mobility: Yes  Bed Mobility 1  Bed Mobility 1: Rolling right, Rolling left  Level of Assistance 1: Maximum assistance, Moderate verbal cues, Minimal tactile cues (Ax 2)  Bed Mobility Comments 1: pt educated on for placement and use of bed rails to assist with mobility.  Bed Mobility 2  Bed Mobility  2: Supine to sitting, Sitting to supine  Level of Assistance 2: Maximum verbal cues (Ax 2)  Bed Mobility Comments 2: cues for technique. pt with assist to advance LES and transition trunk.  Bed Mobility 3  Bed Mobility 3: Scooting  Level of Assistance 3: Maximum assistance, Maximum verbal cues (Ax 2)  Bed Mobility Comments 3: cues for technique.    Transfers  Transfer: Yes  Transfer 1  Technique 1: Sit to stand, Stand to sit  Transfer Device 1: Walker  Transfer Level of Assistance 1: Maximum assistance, +2, Moderate verbal cues, Moderate tactile cues  Trials/Comments 1: cues for foot and hand placement. pt with difficulty placing feet under her. pt with poor weight shift to stand.    Outcome Measures:  [facility] Basic Mobility  Turning from your back to your side while in a flat bed without using bedrails: A lot  Moving from lying on your back to sitting on the side of a flat bed without using bedrails: A lot  Moving to and from bed to chair (including a wheelchair): Total  Standing up from a chair using your arms (e.g. wheelchair or bedside chair): A lot  To walk in hospital room: Total  Climbing 3-5 steps with railing: Total  Basic Mobility - Total Score: 9    Education Documentation  Body Mechanics, taught by Alecia Mota PTA at 5/7/2024   1:53 PM.  Learner: Patient  Readiness: Acceptance  Method: Explanation  Response: Needs Reinforcement    Home Exercise Program, taught by Alecia Mota PTA at 5/7/2024  1:53 PM.  Learner: Patient  Readiness: Acceptance  Method: Explanation  Response: Needs Reinforcement    Precautions, taught by Alecia Mota PTA at 5/7/2024  1:53 PM.  Learner: Patient  Readiness: Acceptance  Method: Explanation  Response: Needs Reinforcement    Mobility Training, taught by Alecia Mota PTA at 5/7/2024  1:53 PM.  Learner: Patient  Readiness: Acceptance  Method: Explanation  Response: Needs Reinforcement    Education Comments  No comments found.             Encounter Problems       Encounter Problems (Active)       PT Transfers       STG - Transfer from bed to chair with max A x1 using LRD (Progressing)       Start:  05/04/24    Expected End:  05/07/24            STG - Patient will perform bed mobility with min a x1 (Progressing)       Start:  05/04/24    Expected End:  05/07/24            STG - Patient will transfer sit to and from stand using LRD with mod A x1 (Progressing)       Start:  05/04/24    Expected End:  05/07/24

## 2024-05-07 NOTE — CARE PLAN
The clinical goals for the shift include Patient will remain free from fall and injury during the length of my shift.    Over the shift, the patient did make progress toward the following goals.     Problem: Safety - Adult  Goal: Free from fall injury  Outcome: Progressing     Problem: Discharge Planning  Goal: Discharge to home or other facility with appropriate resources  Outcome: Progressing     Problem: Chronic Conditions and Co-morbidities  Goal: Patient's chronic conditions and co-morbidity symptoms are monitored and maintained or improved  Outcome: Progressing     Problem: Skin  Goal: Decreased wound size/increased tissue granulation at next dressing change  Outcome: Progressing  Flowsheets (Taken 5/7/2024 0612)  Decreased wound size/increased tissue granulation at next dressing change:   Promote sleep for wound healing   Protective dressings over bony prominences  Goal: Participates in plan/prevention/treatment measures  Outcome: Progressing  Flowsheets (Taken 5/7/2024 0612)  Participates in plan/prevention/treatment measures: Discuss with provider PT/OT consult  Goal: Prevent/manage excess moisture  Outcome: Progressing  Flowsheets (Taken 5/7/2024 0612)  Prevent/manage excess moisture:   Cleanse incontinence/protect with barrier cream   Moisturize dry skin  Goal: Prevent/minimize sheer/friction injuries  Outcome: Progressing  Flowsheets (Taken 5/7/2024 0612)  Prevent/minimize sheer/friction injuries:   Complete micro-shifts as needed if patient unable. Adjust patient position to relieve pressure points, not a full turn   HOB 30 degrees or less   Turn/reposition every 2 hours/use positioning/transfer devices   Use pull sheet  Goal: Promote/optimize nutrition  Outcome: Progressing  Flowsheets (Taken 5/7/2024 0612)  Promote/optimize nutrition:   Assist with feeding   Consume > 50% meals/supplements   Monitor/record intake including meals  Goal: Promote skin healing  Outcome: Progressing  Flowsheets (Taken  5/7/2024 0612)  Promote skin healing:   Assess skin/pad under line(s)/device(s)   Protective dressings over bony prominences   Turn/reposition every 2 hours/use positioning/transfer devices     Problem: Nutrition  Goal: Oral intake greater 75%  Outcome: Progressing  Goal: Consume prescribed supplement  Outcome: Progressing  Goal: Promote healing  Outcome: Progressing     Problem: Fall/Injury  Goal: Not fall by end of shift  Outcome: Progressing  Goal: Be free from injury by end of the shift  Outcome: Progressing  Goal: Verbalize understanding of personal risk factors for fall in the hospital  Outcome: Progressing  Goal: Verbalize understanding of risk factor reduction measures to prevent injury from fall in the home  Outcome: Progressing  Goal: Use assistive devices by end of the shift  Outcome: Progressing  Goal: Pace activities to prevent fatigue by end of the shift  Outcome: Progressing

## 2024-05-07 NOTE — PROGRESS NOTES
Qi Davila is a 83 y.o. female on day 4 of admission presenting with Complicated UTI (urinary tract infection).      Subjective   Qi Davila is a 83 y.o. 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  The patient was sent to the emergency department due to reports of low blood pressure and urinary tract infection patient had been more fatigued at her long-term care facility.  She does have some chronic memory loss but denies abdominal pain fevers chills nausea she states she has not been eating very well. Patient was also hypotensive reports of blood pressure in the 80s over 40s subsequently given IV fluids with improvement to 90s over 40s and now in the low 100s. CT scan of abdomen and pelvis was negative for any obstruction there were findings of increased stool in the rectal vault CT of the head was without acute findings chest x-ray also negative for any infiltrate effusion or pulmonary edema.  EKG shows sinus rhythm at a rate of 66 bpm no ST change elevation nonischemic EKG, normal intervals. Lactate negative. Manual disimpaction in ED @1705- report is remaining stool is soft.     5/3/2024: No acute events overnight. Vitals stable, no further hypotension, /81 this morning. Labs largely unremarkable.  No leukocytosis. Does have some anemia, suspect element of hemodilution. She denies any acute complaints,s he is actually oriented x2-2.5  5/4: Patient was seen and examined.  She is awake and alert and oriented x 3.  Blood pressure remained stable.  PT/OT yet to see.  Blood cultures are negative.  Urine cultures are pending.  Continue to trend CBC and BMP daily.  5/5: Patient was seen and examined.  Continues to be awake alert and interactive.  Seen by PT OT who recommend extended-care facility on discharge.  Blood cultures are  positive for Enterococcus faecium awaiting sensitivities; blood cultures remain negative.  Continue to trend CBC and BMP daily.  5/6: Patient was seen and examined.  Continues to be awake alert and interactive.  Enterococcus faecium in urine sensitive to vancomycin and Macrobid.  Started on IV vancomycin.  Likely switch to p.o. Macrobid on discharge.  Blood cultures remain negative.  Awaiting authorization for placement in an extended care facility.  5/7: Patient was seen and examined.  Denies any new complaints today.  Continue IV vancomycin with likely switch to Macrobid.  Patient was denies any shortness authorization for skilled nursing facility and now in an appeal process.  Deconditioning is likely related to current resolving urinary tract infection with Enterococcus.  She would therefore benefit from further rehabilitation in a skilled nursing facility for a short time prior to discharge to assisted living facility.      Review of Systems   Constitutional:  Negative for appetite change, chills, diaphoresis, fatigue and fever.   HENT:  Negative for congestion, ear pain, facial swelling, hearing loss, nosebleeds, sore throat, tinnitus and trouble swallowing.    Eyes:  Negative for pain.   Respiratory:  Negative for cough, chest tightness, shortness of breath and wheezing.    Cardiovascular:  Negative for chest pain, palpitations and leg swelling.   Gastrointestinal:  Negative for abdominal pain, blood in stool, constipation, diarrhea, nausea and vomiting.   Genitourinary:  Negative for dysuria, flank pain, frequency, hematuria and urgency.   Musculoskeletal:  Negative for back pain and joint swelling.   Skin:  Negative for rash and wound.   Neurological:  Negative for dizziness, syncope, weakness, light-headedness, numbness and headaches.   Hematological:  Does not bruise/bleed easily.   Psychiatric/Behavioral:  Negative for behavioral problems, hallucinations and suicidal ideas.           Objective     Last  Recorded Vitals  /76 (BP Location: Right arm, Patient Position: Sitting)   Pulse 81   Temp 36.6 °C (97.8 °F) (Temporal)   Resp 14   Wt 81.6 kg (180 lb)   SpO2 97%     Image Results  CT abdomen pelvis w IV contrast    Result Date: 5/2/2024  STUDY: CT Abdomen and Pelvis with IV Contrast; 05/02/2024, 3:59 PM. INDICATION: Generalized abdominal pain. COMPARISON: CT AP: 09/28/23. ACCESSION NUMBER(S): [id number] ORDERING CLINICIAN: CELENA JONAS TECHNIQUE: CT of the abdomen and pelvis was performed.  Contiguous axial images were obtained at 3 mm slice thickness through the abdomen and pelvis. Coronal and sagittal reconstructions at 3 mm slice thickness were performed.  Omnipaque 350 75 mL was administered intravenously.  FINDINGS: LOWER CHEST: No cardiomegaly.  No pericardial effusion.  Linear bibasilar areas of atelectasis  ABDOMEN:  LIVER: No hepatomegaly.  Smooth surface contour.  Mild fatty infiltration of the liver.  Stable small hepatic cysts  BILE DUCTS: No intrahepatic or extrahepatic biliary ductal dilatation.  GALLBLADDER: The gallbladder is present without gallstones. STOMACH: Moderate size hiatal hernia.  PANCREAS: Pancreatic parenchymal calcifications  SPLEEN: No splenomegaly or focal splenic lesion.  ADRENAL GLANDS: No thickening or nodules.  KIDNEYS AND URETERS: Kidneys are normal in size and location.  No renal or ureteral calculi.  PELVIS:  BLADDER: No abnormalities identified.  REPRODUCTIVE ORGANS: No abnormalities identified.  BOWEL: Appendix is normal.  Large amount of formed stool in the rectal vault measuring up to 8.3 cm in diameter with mild perirectal fat stranding.  Moderate stool throughout the remainder of the colon.  VESSELS: No abnormalities identified.  Abdominal aorta is normal in caliber. Moderate to severe plaque along the distal aorta.  PERITONEUM/RETROPERITONEUM/LYMPH NODES: No free fluid.  No pneumoperitoneum. No lymphadenopathy.  ABDOMINAL WALL: No abnormalities  identified. SOFT TISSUES: No abnormalities identified.  BONES: No acute fracture or aggressive osseous lesion.  Bilateral total hip arthroplasty.  Scoliosis and degenerative change of the lumbar spine.    1. Large amount of formed stool in the rectal vault measuring up to 8.3 cm in diameter with mild perirectal fat stranding. Moderate stool throughout the remainder of the colon. Findings suggestive of fecal impaction with mild associated stercoral proctitis. 2. Mild hepatic steatosis. 3. Moderate size hiatal hernia. 4. Findings of chronic pancreatitis. Signed by Farnkie Galindo MD    CT head wo IV contrast    Result Date: 5/2/2024  Interpreted By:  Joesph Ramírez, STUDY: CT HEAD WO IV CONTRAST;  5/2/2024 3:55 pm   INDICATION: Altered mental status.   COMPARISON: None.   ACCESSION NUMBER(S): [id number]   ORDERING CLINICIAN: CELENA JONAS   TECHNIQUE: Noncontrast axial CT scan of head was performed.   FINDINGS: Parenchyma: There is no intracranial hemorrhage. The grey-white differentiation is intact. There is no mass effect or midline shift. Patchy supratentorial hypodensity, nonspecific, but likely secondary to mild chronic microvascular ischemia.   CSF Spaces: Mild generalized volume loss with concordant ventriculomegaly.   Extra-Axial Fluid: There is no extraaxial fluid collection.   Calvarium: The calvarium is unremarkable.   Paranasal sinuses: Visualized paranasal sinuses are clear.   Mastoids: Clear.   Orbits: Normal.   Soft tissues: Unremarkable.       No acute intracranial hemorrhage, mass effect, or CT apparent acute infarct. Chronic microvascular ischemic and involutional changes.   Signed by: Joesph Ramírez 5/2/2024 4:13 PM Dictation workstation:   [id number]    XR chest 2 views    Result Date: 5/2/2024  STUDY: Chest Radiographs;  5/2/2024 at 14:26 INDICATION: Lethargy, cough. COMPARISON: XR Chest 10/2/23. ACCESSION NUMBER(S): [id number] ORDERING CLINICIAN: CELENA JONAS TECHNIQUE:   Frontal and lateral chest. FINDINGS: CARDIOMEDIASTINAL SILHOUETTE: Cardiomediastinal silhouette is normal in size and configuration.  LUNGS: There is elevation the left hemidiaphragm.  ABDOMEN: No remarkable upper abdominal findings.  BONES: There are degenerative change with joint space loss osteophytes and loose bodies involving both shoulders.    No evidence consolidating infiltrate or effusion. Signed by Christopher Villalta MD    ECG 12 lead    Result Date: 5/2/2024  Sinus rhythm Low voltage, precordial leads       Lab Results  Results for orders placed or performed during the hospital encounter of 05/02/24 (from the past 24 hour(s))   CBC   Result Value Ref Range    WBC 7.2 4.4 - 11.3 x10*3/uL    nRBC 0.0 0.0 - 0.0 /100 WBCs    RBC 3.12 (L) 4.00 - 5.20 x10*6/uL    Hemoglobin 9.9 (L) 12.0 - 16.0 g/dL    Hematocrit 30.1 (L) 36.0 - 46.0 %    MCV 97 80 - 100 fL    MCH 31.7 26.0 - 34.0 pg    MCHC 32.9 32.0 - 36.0 g/dL    RDW 13.8 11.5 - 14.5 %    Platelets 229 150 - 450 x10*3/uL   Basic Metabolic Panel   Result Value Ref Range    Glucose 87 74 - 99 mg/dL    Sodium 131 (L) 136 - 145 mmol/L    Potassium 3.3 (L) 3.5 - 5.3 mmol/L    Chloride 101 98 - 107 mmol/L    Bicarbonate 29 21 - 32 mmol/L    Anion Gap <7 (L) 10 - 20 mmol/L    Urea Nitrogen 13 6 - 23 mg/dL    Creatinine 0.35 (L) 0.50 - 1.05 mg/dL    eGFR >90 >60 mL/min/1.73m*2    Calcium 8.8 8.6 - 10.3 mg/dL   Magnesium   Result Value Ref Range    Magnesium 1.60 1.60 - 2.40 mg/dL   Vancomycin   Result Value Ref Range    Vancomycin 18.4 5.0 - 20.0 ug/mL        Medications  Scheduled medications:  ARIPiprazole, 2.5 mg, oral, Daily  aspirin, 81 mg, oral, Daily  brimonidine, 1 drop, Both Eyes, BID  cefTRIAXone, 1 g, intravenous, q24h  divalproex sprinkle, 125 mg, oral, BID  enoxaparin, 40 mg, subcutaneous, q24h  furosemide, 20 mg, oral, q AM  [Held by provider] lactase, 3,000 Units, oral, TID with meals  polyethylene glycol, 17 g, oral, Daily  potassium chloride CR, 20 mEq,  oral, Daily  potassium chloride CR, 40 mEq, oral, BID  propranolol, 20 mg, oral, BID  tamsulosin, 0.4 mg, oral, Daily  timolol, 1 drop, Both Eyes, Daily  traZODone, 25 mg, oral, Nightly  vancomycin, 1,000 mg, intravenous, q12h      Continuous medications:  lactated Ringer's, 100 mL/hr, Last Rate: 100 mL/hr (05/07/24 0630)      PRN medications:  PRN medications: alum-mag hydroxide-simeth, docusate sodium, loperamide, ondansetron, polyethylene glycol, vancomycin     Physical Exam  Constitutional:       General: She is not in acute distress.     Appearance: Normal appearance.      Comments: Elderly female, siting upright in bed   HENT:      Head: Normocephalic and atraumatic.      Right Ear: External ear normal.      Left Ear: External ear normal.      Nose: Nose normal.      Mouth/Throat:      Mouth: Mucous membranes are moist.      Pharynx: Oropharynx is clear.   Eyes:      Extraocular Movements: Extraocular movements intact.      Conjunctiva/sclera: Conjunctivae normal.      Pupils: Pupils are equal, round, and reactive to light.   Cardiovascular:      Rate and Rhythm: Normal rate and regular rhythm.      Pulses: Normal pulses.      Heart sounds: Normal heart sounds.   Pulmonary:      Effort: Pulmonary effort is normal. No respiratory distress.      Breath sounds: Normal breath sounds. No wheezing, rhonchi or rales.   Abdominal:      General: Bowel sounds are normal.      Palpations: Abdomen is soft.      Tenderness: There is no abdominal tenderness. There is no right CVA tenderness, left CVA tenderness, guarding or rebound.   Genitourinary:     Comments: Samson with clear yellow urine  Musculoskeletal:         General: No swelling. Normal range of motion.      Cervical back: Normal range of motion and neck supple.   Skin:     General: Skin is warm and dry.      Capillary Refill: Capillary refill takes less than 2 seconds.      Findings: Lesion present. No rash.      Comments: Left lower leg wound   Neurological:       General: No focal deficit present.      Mental Status: She is alert. Mental status is at baseline.      Comments: Slight tremor   Psychiatric:         Mood and Affect: Mood normal.         Behavior: Behavior normal.                  Code Status  Full Code     Assessment/Plan      Enterococcus faecium bacteriuria and complicated CAUTI, POA  Continue patient on IV Rocephin   Blood cultures are negative x 1 day  Urine cultures are still pending  Patient unable to elaborate exactly why she has catheter in place, appears to be related to chronic incontinence and wounds  Will request wound care consultation     Hypotension unclear if related to sepsis or poor intake  Patient had good response to IV fluids in emergency department  Continue supplemental IV fluids for now  Monitor blood pressure closely  Avoid hypotensive medications- reintroduce home meds as appropriate      Slow transit constipation  Patient appeared to have fecal impaction on diagnostic imaging  Patient was partially disimpacted from the ER report is remaining stool is soft  We will continue patient on MiraLAX     Major depressive disorder with history of psychosis  Continue home medications    Left lower leg wound  Wound care consult appreciated  Wound care recommends follow up in [facility] wound [facility] for this    DVT ppx: Lovenox     Spent 35 minutes in the follow-up management of this patient today.  Please see orders for more complete plan    Solo Sage MD

## 2024-05-08 LAB
ANION GAP SERPL CALC-SCNC: 7 MMOL/L (ref 10–20)
BUN SERPL-MCNC: 17 MG/DL (ref 6–23)
CALCIUM SERPL-MCNC: 9.2 MG/DL (ref 8.6–10.3)
CHLORIDE SERPL-SCNC: 102 MMOL/L (ref 98–107)
CO2 SERPL-SCNC: 28 MMOL/L (ref 21–32)
CREAT SERPL-MCNC: 0.8 MG/DL (ref 0.5–1.05)
EGFRCR SERPLBLD CKD-EPI 2021: 72 ML/MIN/1.73M*2
GLUCOSE SERPL-MCNC: 99 MG/DL (ref 74–99)
POTASSIUM SERPL-SCNC: 3.8 MMOL/L (ref 3.5–5.3)
SODIUM SERPL-SCNC: 133 MMOL/L (ref 136–145)

## 2024-05-08 PROCEDURE — 2500000004 HC RX 250 GENERAL PHARMACY W/ HCPCS (ALT 636 FOR OP/ED): Performed by: NURSE PRACTITIONER

## 2024-05-08 PROCEDURE — 2500000001 HC RX 250 WO HCPCS SELF ADMINISTERED DRUGS (ALT 637 FOR MEDICARE OP): Performed by: PHYSICIAN ASSISTANT

## 2024-05-08 PROCEDURE — 80048 BASIC METABOLIC PNL TOTAL CA: CPT | Performed by: INTERNAL MEDICINE

## 2024-05-08 PROCEDURE — 36415 COLL VENOUS BLD VENIPUNCTURE: CPT | Performed by: INTERNAL MEDICINE

## 2024-05-08 PROCEDURE — 99233 SBSQ HOSP IP/OBS HIGH 50: CPT | Performed by: INTERNAL MEDICINE

## 2024-05-08 PROCEDURE — 2500000001 HC RX 250 WO HCPCS SELF ADMINISTERED DRUGS (ALT 637 FOR MEDICARE OP): Performed by: NURSE PRACTITIONER

## 2024-05-08 PROCEDURE — 2500000006 HC RX 250 W HCPCS SELF ADMINISTERED DRUGS (ALT 637 FOR ALL PAYERS): Performed by: INTERNAL MEDICINE

## 2024-05-08 PROCEDURE — 2500000001 HC RX 250 WO HCPCS SELF ADMINISTERED DRUGS (ALT 637 FOR MEDICARE OP): Performed by: INTERNAL MEDICINE

## 2024-05-08 PROCEDURE — 1210000001 HC SEMI-PRIVATE ROOM DAILY

## 2024-05-08 PROCEDURE — 2500000004 HC RX 250 GENERAL PHARMACY W/ HCPCS (ALT 636 FOR OP/ED)

## 2024-05-08 RX ADMIN — SODIUM CHLORIDE, POTASSIUM CHLORIDE, SODIUM LACTATE AND CALCIUM CHLORIDE 100 ML/HR: 600; 310; 30; 20 INJECTION, SOLUTION INTRAVENOUS at 22:59

## 2024-05-08 RX ADMIN — VANCOMYCIN HYDROCHLORIDE 1000 MG: 1 INJECTION, SOLUTION INTRAVENOUS at 23:00

## 2024-05-08 RX ADMIN — BRIMONIDINE TARTRATE 1 DROP: 2 SOLUTION/ DROPS OPHTHALMIC at 10:22

## 2024-05-08 RX ADMIN — ARIPIPRAZOLE 2.5 MG: 5 TABLET ORAL at 10:20

## 2024-05-08 RX ADMIN — VANCOMYCIN HYDROCHLORIDE 1000 MG: 1 INJECTION, SOLUTION INTRAVENOUS at 10:19

## 2024-05-08 RX ADMIN — PROPRANOLOL HYDROCHLORIDE 20 MG: 10 TABLET ORAL at 23:00

## 2024-05-08 RX ADMIN — TRAZODONE HYDROCHLORIDE 25 MG: 50 TABLET ORAL at 22:59

## 2024-05-08 RX ADMIN — PROPRANOLOL HYDROCHLORIDE 20 MG: 10 TABLET ORAL at 10:22

## 2024-05-08 RX ADMIN — DIVALPROEX SODIUM 125 MG: 125 CAPSULE, COATED PELLETS ORAL at 23:00

## 2024-05-08 RX ADMIN — ASPIRIN 81 MG CHEWABLE TABLET 81 MG: 81 TABLET CHEWABLE at 10:20

## 2024-05-08 RX ADMIN — DIVALPROEX SODIUM 125 MG: 125 CAPSULE, COATED PELLETS ORAL at 10:22

## 2024-05-08 RX ADMIN — POLYETHYLENE GLYCOL 3350 17 G: 17 POWDER, FOR SOLUTION ORAL at 10:22

## 2024-05-08 RX ADMIN — TAMSULOSIN HYDROCHLORIDE 0.4 MG: 0.4 CAPSULE ORAL at 10:20

## 2024-05-08 RX ADMIN — FUROSEMIDE 20 MG: 20 TABLET ORAL at 10:20

## 2024-05-08 RX ADMIN — POTASSIUM CHLORIDE 20 MEQ: 1500 TABLET, EXTENDED RELEASE ORAL at 10:20

## 2024-05-08 RX ADMIN — ENOXAPARIN SODIUM 40 MG: 40 INJECTION SUBCUTANEOUS at 18:02

## 2024-05-08 RX ADMIN — TIMOLOL MALEATE 1 DROP: 5 SOLUTION/ DROPS OPHTHALMIC at 10:22

## 2024-05-08 RX ADMIN — BRIMONIDINE TARTRATE 1 DROP: 2 SOLUTION/ DROPS OPHTHALMIC at 23:00

## 2024-05-08 RX ADMIN — CEFTRIAXONE SODIUM 1 G: 1 INJECTION, SOLUTION INTRAVENOUS at 17:30

## 2024-05-08 ASSESSMENT — COGNITIVE AND FUNCTIONAL STATUS - GENERAL
TOILETING: TOTAL
STANDING UP FROM CHAIR USING ARMS: A LOT
DRESSING REGULAR LOWER BODY CLOTHING: TOTAL
CLIMB 3 TO 5 STEPS WITH RAILING: TOTAL
DAILY ACTIVITIY SCORE: 10
DRESSING REGULAR UPPER BODY CLOTHING: A LOT
PERSONAL GROOMING: A LOT
HELP NEEDED FOR BATHING: A LOT
EATING MEALS: A LOT
TURNING FROM BACK TO SIDE WHILE IN FLAT BAD: A LOT
MOVING FROM LYING ON BACK TO SITTING ON SIDE OF FLAT BED WITH BEDRAILS: A LOT
MOBILITY SCORE: 10
MOVING TO AND FROM BED TO CHAIR: A LOT
WALKING IN HOSPITAL ROOM: TOTAL

## 2024-05-08 ASSESSMENT — ENCOUNTER SYMPTOMS
BACK PAIN: 0
LIGHT-HEADEDNESS: 0
DIAPHORESIS: 0
NAUSEA: 0
BLOOD IN STOOL: 0
ABDOMINAL PAIN: 0
DYSURIA: 0
WHEEZING: 0
FACIAL SWELLING: 0
PALPITATIONS: 0
HALLUCINATIONS: 0
COUGH: 0
HEADACHES: 0
FEVER: 0
WOUND: 0
BRUISES/BLEEDS EASILY: 0
FREQUENCY: 0
JOINT SWELLING: 0
APPETITE CHANGE: 0
TROUBLE SWALLOWING: 0
NUMBNESS: 0
SORE THROAT: 0
CHILLS: 0
DIZZINESS: 0
CONSTIPATION: 0
EYE PAIN: 0
FATIGUE: 0
VOMITING: 0
DIARRHEA: 0
CHEST TIGHTNESS: 0
HEMATURIA: 0
SHORTNESS OF BREATH: 0
FLANK PAIN: 0
WEAKNESS: 0

## 2024-05-08 ASSESSMENT — PAIN SCALES - PAIN ASSESSMENT IN ADVANCED DEMENTIA (PAINAD)
TOTALSCORE: 0
CONSOLABILITY: NO NEED TO CONSOLE
BREATHING: NORMAL
BODYLANGUAGE: RELAXED
FACIALEXPRESSION: SMILING OR INEXPRESSIVE

## 2024-05-08 ASSESSMENT — PAIN - FUNCTIONAL ASSESSMENT: PAIN_FUNCTIONAL_ASSESSMENT: 0-10

## 2024-05-08 ASSESSMENT — PAIN SCALES - GENERAL
PAINLEVEL_OUTOF10: 0 - NO PAIN
PAINLEVEL_OUTOF10: 0 - NO PAIN

## 2024-05-08 NOTE — CARE PLAN
The patient's goals for the shift include  Rest    The clinical goals for the shift include Patient will remain free from falls or injuries throughout shift.    Over the shift, the patient did not make progress toward the following goals. Barriers to progression include current illness/mental status. Recommendations to address these barriers include frequent reminders of teachings.

## 2024-05-08 NOTE — DOCUMENTATION CLARIFICATION NOTE
"    PATIENT:               SHAHANA CARDONA  ACCT #:                  [id number]  MRN:                       [id number]  :                       1940  ADMIT DATE:       2024 1:19 PM  DISCH DATE:  RESPONDING PROVIDER #:        [id number]          PROVIDER RESPONSE TEXT:    Stage 2 pressure injury to right buttock    CDI QUERY TEXT:    Clarification        Instruction:    Based on your assessment of the patient and the clinical information, please provide the requested documentation by clicking on the appropriate radio button and enter any additional information if prompted.    Question: Please further clarify the pressure injury site, stage/depth    When answering this query, please exercise your independent professional judgment. The fact that a question is being asked, does not imply that any particular answer is desired or expected.    The patient's clinical indicators include:  Clinical Information: 83 yr. old admitted with CAUTI. Chronic indwelling catheter. Hypotension unclear if r/t sepsis or poor po intake. Constipation.    Clinical Indicators:  Per Nursing flow sheet: \" patient with Stage 2 pressure injury to right buttock.\"    Treatment: Foam dressing.    Risk Factors:24 PT notes: \"Total assist of 2 to transfer. Patient reporting she doesn't walk prior to admit.\"  Options provided:  -- Stage 2 pressure injury to right buttock  -- No Stage 2 pressure injury to right buttock  -- Other - I will add my own diagnosis  -- Refer to Clinical Documentation Reviewer    Query created by: Paola Bowman on 2024 12:49 PM      Electronically signed by:  PREM SKINNER MD 2024 11:36 AM          "

## 2024-05-08 NOTE — CARE PLAN
The patient's goals for the shift include Rest     The clinical goals for the shift include Patient will remain free from falls or injuries throughout shift.    Over the shift, the patient did not make progress toward the following goals. Barriers to progression include current illness/mental status. Recommendations to address these barriers include frequent reinforcement of teachings.    Problem: Safety - Adult  Goal: Free from fall injury  Outcome: Progressing     Problem: Discharge Planning  Goal: Discharge to home or other facility with appropriate resources  Outcome: Progressing     Problem: Chronic Conditions and Co-morbidities  Goal: Patient's chronic conditions and co-morbidity symptoms are monitored and maintained or improved  Outcome: Progressing     Problem: Skin  Goal: Decreased wound size/increased tissue granulation at next dressing change  Outcome: Progressing  Goal: Participates in plan/prevention/treatment measures  Outcome: Progressing  Goal: Prevent/manage excess moisture  Outcome: Progressing  Goal: Prevent/minimize sheer/friction injuries  Outcome: Progressing  Goal: Promote/optimize nutrition  Outcome: Progressing  Goal: Promote skin healing  Outcome: Progressing     Problem: Nutrition  Goal: Oral intake greater 75%  Outcome: Progressing  Goal: Consume prescribed supplement  Outcome: Progressing  Goal: Promote healing  Outcome: Progressing     Problem: Fall/Injury  Goal: Not fall by end of shift  Outcome: Progressing  Goal: Be free from injury by end of the shift  Outcome: Progressing  Goal: Verbalize understanding of personal risk factors for fall in the hospital  Outcome: Progressing  Goal: Verbalize understanding of risk factor reduction measures to prevent injury from fall in the home  Outcome: Progressing  Goal: Use assistive devices by end of the shift  Outcome: Progressing  Goal: Pace activities to prevent fatigue by end of the shift  Outcome: Progressing

## 2024-05-08 NOTE — CARE PLAN
The patient's goals for the shift include  (patient not able to answer. Nonverbal at baseline)    The clinical goals for the shift include Patient will remain free from falls or injuries throughout shift.    Over the shift, the patient did not make progress toward the following goals. Barriers to progression include . Recommendations to address these barriers include .

## 2024-05-08 NOTE — PROGRESS NOTES
Qi Davila is a 83 y.o. female on day 5 of admission presenting with Complicated UTI (urinary tract infection).      Subjective   Qi Davila is a 83 y.o. 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  The patient was sent to the emergency department due to reports of low blood pressure and urinary tract infection patient had been more fatigued at her long-term care facility.  She does have some chronic memory loss but denies abdominal pain fevers chills nausea she states she has not been eating very well. Patient was also hypotensive reports of blood pressure in the 80s over 40s subsequently given IV fluids with improvement to 90s over 40s and now in the low 100s. CT scan of abdomen and pelvis was negative for any obstruction there were findings of increased stool in the rectal vault CT of the head was without acute findings chest x-ray also negative for any infiltrate effusion or pulmonary edema.  EKG shows sinus rhythm at a rate of 66 bpm no ST change elevation nonischemic EKG, normal intervals. Lactate negative. Manual disimpaction in ED @1705- report is remaining stool is soft.     5/3/2024: No acute events overnight. Vitals stable, no further hypotension, /81 this morning. Labs largely unremarkable.  No leukocytosis. Does have some anemia, suspect element of hemodilution. She denies any acute complaints,s he is actually oriented x2-2.5  5/4: Patient was seen and examined.  She is awake and alert and oriented x 3.  Blood pressure remained stable.  PT/OT yet to see.  Blood cultures are negative.  Urine cultures are pending.  Continue to trend CBC and BMP daily.  5/5: Patient was seen and examined.  Continues to be awake alert and interactive.  Seen by PT OT who recommend extended-care facility on discharge.  Blood cultures are  positive for Enterococcus faecium awaiting sensitivities; blood cultures remain negative.  Continue to trend CBC and BMP daily.  5/6: Patient was seen and examined.  Continues to be awake alert and interactive.  Enterococcus faecium in urine sensitive to vancomycin and Macrobid.  Started on IV vancomycin.  Likely switch to p.o. Macrobid on discharge.  Blood cultures remain negative.  Awaiting authorization for placement in an extended care facility.  5/7: Patient was seen and examined.  Denies any new complaints today.  Continue IV vancomycin with likely switch to Macrobid.  Patient was denies any shortness authorization for skilled nursing facility and now in an appeal process.  Deconditioning is likely related to current resolving urinary tract infection with Enterococcus.  She would therefore benefit from further rehabilitation in a skilled nursing facility for a short time prior to discharge to assisted living facility.  5/8: Patient was seen and examined.  Has no new complaints today.  Continue current IV antibiotics.  Awaiting appeal about authorization for placement in an extended care facility  Review of Systems   Constitutional:  Negative for appetite change, chills, diaphoresis, fatigue and fever.   HENT:  Negative for congestion, ear pain, facial swelling, hearing loss, nosebleeds, sore throat, tinnitus and trouble swallowing.    Eyes:  Negative for pain.   Respiratory:  Negative for cough, chest tightness, shortness of breath and wheezing.    Cardiovascular:  Negative for chest pain, palpitations and leg swelling.   Gastrointestinal:  Negative for abdominal pain, blood in stool, constipation, diarrhea, nausea and vomiting.   Genitourinary:  Negative for dysuria, flank pain, frequency, hematuria and urgency.   Musculoskeletal:  Negative for back pain and joint swelling.   Skin:  Negative for rash and wound.   Neurological:  Negative for dizziness, syncope, weakness, light-headedness, numbness and  headaches.   Hematological:  Does not bruise/bleed easily.   Psychiatric/Behavioral:  Negative for behavioral problems, hallucinations and suicidal ideas.           Objective     Last Recorded Vitals  /71 (BP Location: Right arm, Patient Position: Lying)   Pulse 84   Temp 36.3 °C (97.4 °F) (Temporal)   Resp 16   Wt 81.6 kg (180 lb)   SpO2 98%     Image Results  CT abdomen pelvis w IV contrast    Result Date: 5/2/2024  STUDY: CT Abdomen and Pelvis with IV Contrast; 05/02/2024, 3:59 PM. INDICATION: Generalized abdominal pain. COMPARISON: CT AP: 09/28/23. ACCESSION NUMBER(S): [id number] ORDERING CLINICIAN: CELENA JONAS TECHNIQUE: CT of the abdomen and pelvis was performed.  Contiguous axial images were obtained at 3 mm slice thickness through the abdomen and pelvis. Coronal and sagittal reconstructions at 3 mm slice thickness were performed.  Omnipaque 350 75 mL was administered intravenously.  FINDINGS: LOWER CHEST: No cardiomegaly.  No pericardial effusion.  Linear bibasilar areas of atelectasis  ABDOMEN:  LIVER: No hepatomegaly.  Smooth surface contour.  Mild fatty infiltration of the liver.  Stable small hepatic cysts  BILE DUCTS: No intrahepatic or extrahepatic biliary ductal dilatation.  GALLBLADDER: The gallbladder is present without gallstones. STOMACH: Moderate size hiatal hernia.  PANCREAS: Pancreatic parenchymal calcifications  SPLEEN: No splenomegaly or focal splenic lesion.  ADRENAL GLANDS: No thickening or nodules.  KIDNEYS AND URETERS: Kidneys are normal in size and location.  No renal or ureteral calculi.  PELVIS:  BLADDER: No abnormalities identified.  REPRODUCTIVE ORGANS: No abnormalities identified.  BOWEL: Appendix is normal.  Large amount of formed stool in the rectal vault measuring up to 8.3 cm in diameter with mild perirectal fat stranding.  Moderate stool throughout the remainder of the colon.  VESSELS: No abnormalities identified.  Abdominal aorta is normal in caliber.  Moderate to severe plaque along the distal aorta.  PERITONEUM/RETROPERITONEUM/LYMPH NODES: No free fluid.  No pneumoperitoneum. No lymphadenopathy.  ABDOMINAL WALL: No abnormalities identified. SOFT TISSUES: No abnormalities identified.  BONES: No acute fracture or aggressive osseous lesion.  Bilateral total hip arthroplasty.  Scoliosis and degenerative change of the lumbar spine.    1. Large amount of formed stool in the rectal vault measuring up to 8.3 cm in diameter with mild perirectal fat stranding. Moderate stool throughout the remainder of the colon. Findings suggestive of fecal impaction with mild associated stercoral proctitis. 2. Mild hepatic steatosis. 3. Moderate size hiatal hernia. 4. Findings of chronic pancreatitis. Signed by Frankie Galindo MD    CT head wo IV contrast    Result Date: 5/2/2024  Interpreted By:  Joesph Ramírez, STUDY: CT HEAD WO IV CONTRAST;  5/2/2024 3:55 pm   INDICATION: Altered mental status.   COMPARISON: None.   ACCESSION NUMBER(S): [id number]   ORDERING CLINICIAN: CELENA JONAS   TECHNIQUE: Noncontrast axial CT scan of head was performed.   FINDINGS: Parenchyma: There is no intracranial hemorrhage. The grey-white differentiation is intact. There is no mass effect or midline shift. Patchy supratentorial hypodensity, nonspecific, but likely secondary to mild chronic microvascular ischemia.   CSF Spaces: Mild generalized volume loss with concordant ventriculomegaly.   Extra-Axial Fluid: There is no extraaxial fluid collection.   Calvarium: The calvarium is unremarkable.   Paranasal sinuses: Visualized paranasal sinuses are clear.   Mastoids: Clear.   Orbits: Normal.   Soft tissues: Unremarkable.       No acute intracranial hemorrhage, mass effect, or CT apparent acute infarct. Chronic microvascular ischemic and involutional changes.   Signed by: Joesph Ramírez 5/2/2024 4:13 PM Dictation workstation:   [id number]    XR chest 2 views    Result Date: 5/2/2024  STUDY:  Chest Radiographs;  5/2/2024 at 14:26 INDICATION: Lethargy, cough. COMPARISON: XR Chest 10/2/23. ACCESSION NUMBER(S): [id number] ORDERING CLINICIAN: CELENA JONAS TECHNIQUE:  Frontal and lateral chest. FINDINGS: CARDIOMEDIASTINAL SILHOUETTE: Cardiomediastinal silhouette is normal in size and configuration.  LUNGS: There is elevation the left hemidiaphragm.  ABDOMEN: No remarkable upper abdominal findings.  BONES: There are degenerative change with joint space loss osteophytes and loose bodies involving both shoulders.    No evidence consolidating infiltrate or effusion. Signed by Christopher Villalta MD    ECG 12 lead    Result Date: 5/2/2024  Sinus rhythm Low voltage, precordial leads       Lab Results  Results for orders placed or performed during the hospital encounter of 05/02/24 (from the past 24 hour(s))   Basic metabolic panel   Result Value Ref Range    Glucose 99 74 - 99 mg/dL    Sodium 133 (L) 136 - 145 mmol/L    Potassium 3.8 3.5 - 5.3 mmol/L    Chloride 102 98 - 107 mmol/L    Bicarbonate 28 21 - 32 mmol/L    Anion Gap 7 (L) 10 - 20 mmol/L    Urea Nitrogen 17 6 - 23 mg/dL    Creatinine 0.80 0.50 - 1.05 mg/dL    eGFR 72 >60 mL/min/1.73m*2    Calcium 9.2 8.6 - 10.3 mg/dL        Medications  Scheduled medications:  ARIPiprazole, 2.5 mg, oral, Daily  aspirin, 81 mg, oral, Daily  brimonidine, 1 drop, Both Eyes, BID  cefTRIAXone, 1 g, intravenous, q24h  divalproex sprinkle, 125 mg, oral, BID  enoxaparin, 40 mg, subcutaneous, q24h  furosemide, 20 mg, oral, q AM  [Held by provider] lactase, 3,000 Units, oral, TID with meals  polyethylene glycol, 17 g, oral, Daily  potassium chloride CR, 20 mEq, oral, Daily  propranolol, 20 mg, oral, BID  tamsulosin, 0.4 mg, oral, Daily  timolol, 1 drop, Both Eyes, Daily  traZODone, 25 mg, oral, Nightly  vancomycin, 1,000 mg, intravenous, q12h      Continuous medications:  lactated Ringer's, 100 mL/hr, Last Rate: 100 mL/hr (05/07/24 1681)      PRN medications:  PRN medications:  alum-mag hydroxide-simeth, docusate sodium, loperamide, ondansetron, polyethylene glycol, vancomycin     Physical Exam  Constitutional:       General: She is not in acute distress.     Appearance: Normal appearance.      Comments: Elderly female, siting upright in bed   HENT:      Head: Normocephalic and atraumatic.      Right Ear: External ear normal.      Left Ear: External ear normal.      Nose: Nose normal.      Mouth/Throat:      Mouth: Mucous membranes are moist.      Pharynx: Oropharynx is clear.   Eyes:      Extraocular Movements: Extraocular movements intact.      Conjunctiva/sclera: Conjunctivae normal.      Pupils: Pupils are equal, round, and reactive to light.   Cardiovascular:      Rate and Rhythm: Normal rate and regular rhythm.      Pulses: Normal pulses.      Heart sounds: Normal heart sounds.   Pulmonary:      Effort: Pulmonary effort is normal. No respiratory distress.      Breath sounds: Normal breath sounds. No wheezing, rhonchi or rales.   Abdominal:      General: Bowel sounds are normal.      Palpations: Abdomen is soft.      Tenderness: There is no abdominal tenderness. There is no right CVA tenderness, left CVA tenderness, guarding or rebound.   Genitourinary:     Comments: Samson with clear yellow urine  Musculoskeletal:         General: No swelling. Normal range of motion.      Cervical back: Normal range of motion and neck supple.   Skin:     General: Skin is warm and dry.      Capillary Refill: Capillary refill takes less than 2 seconds.      Findings: Lesion present. No rash.      Comments: Left lower leg wound   Neurological:      General: No focal deficit present.      Mental Status: She is alert. Mental status is at baseline.      Comments: Slight tremor   Psychiatric:         Mood and Affect: Mood normal.         Behavior: Behavior normal.                  Code Status  Full Code     Assessment/Plan      Enterococcus faecium bacteriuria and complicated CAUTI, POA  Continue patient on  IV Rocephin   Blood cultures are negative x 1 day  Urine cultures are still pending  Patient unable to elaborate exactly why she has catheter in place, appears to be related to chronic incontinence and wounds  Will request wound care consultation     Hypotension unclear if related to sepsis or poor intake  Patient had good response to IV fluids in emergency department  Continue supplemental IV fluids for now  Monitor blood pressure closely  Avoid hypotensive medications- reintroduce home meds as appropriate      Slow transit constipation  Patient appeared to have fecal impaction on diagnostic imaging  Patient was partially disimpacted from the ER report is remaining stool is soft  We will continue patient on MiraLAX     Major depressive disorder with history of psychosis  Continue home medications    Left lower leg wound  Wound care consult appreciated  Wound care recommends follow up in [place] wound clinic for this    DVT ppx: Lovenox     Spent 35 minutes in the follow-up management of this patient today.  Please see orders for more complete plan    Solo Sage MD

## 2024-05-08 NOTE — PROGRESS NOTES
Nutrition Follow Up Assessment:   Nutrition Assessment         Medical history per chart:   83 y.o. 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      5/8:  Patient sleeping comfortably at time of visit, history obtained from chart review.  Lunch tray present at bedside, food items untouched however patient consumed almost all of Jerzy supplement.       5/3:  Pt very fatigued, falling asleep during visit, and agreeable to lunch.  Pt agreeable to trial Ensure supplements d/t decreased appetite, and PO intake.  Pt is on  gluten free diet, and RN reports pt only avoid milk and cheese because it causes diarrhea.   Removed milk as an allergy.      Nutrition History:  Food and Nutrient History: Patient unable to report.  Very fatigued       Current Diet: Adult diet Regular, Gluten free   Supplement(s): Yes: Jerzy and Ensure plus high protein BID   Average meal Intake during admission: <75%   Average supplement intake during admission: unable to assess     Nutrition Related Findings:   Oral Symptoms: unable to assess at this time     GI symptoms: RD unable to assess GI symptoms at this time.   BM: Last BM Date: 05/05/24  Wound Type:  lower leg wound   (nursing/wound notes provide further details)    Food allergies: NKFA. is allergic to gluten, hydrocodone, hydrocodone-acetaminophen, latex, penicillins, and prednisone.  Meds/Labs reviewed.  ARIPiprazole, 2.5 mg, oral, Daily  aspirin, 81 mg, oral, Daily  brimonidine, 1 drop, Both Eyes, BID  cefTRIAXone, 1 g, intravenous, q24h  divalproex sprinkle, 125 mg, oral, BID  enoxaparin, 40 mg, subcutaneous, q24h  furosemide, 20 mg, oral, q AM  [Held by provider] lactase, 3,000 Units, oral, TID with meals  polyethylene glycol, 17 g, oral, Daily  potassium chloride CR, 20 mEq, oral, Daily  propranolol, 20 mg,  "oral, BID  tamsulosin, 0.4 mg, oral, Daily  timolol, 1 drop, Both Eyes, Daily  traZODone, 25 mg, oral, Nightly  vancomycin, 1,000 mg, intravenous, q12h       lactated Ringer's, 100 mL/hr, Last Rate: 100 mL/hr (05/07/24 9348)         Nutrition Significant Labs:    Results from last 7 days   Lab Units 05/08/24  0417 05/07/24  0435 05/06/24  0325 05/05/24  0509   GLUCOSE mg/dL 99 87 94 96   SODIUM mmol/L 133* 131* 132* 132*   POTASSIUM mmol/L 3.8 3.3* 3.8 3.4*   CHLORIDE mmol/L 102 101 102 103   CO2 mmol/L 28 29 28 27   BUN mg/dL 17 13 10 11   CREATININE mg/dL 0.80 0.35* 0.38* 0.41*   EGFR mL/min/1.73m*2 72 >90 >90 >90   CALCIUM mg/dL 9.2 8.8 9.0 8.5*   MAGNESIUM mg/dL  --  1.60 1.61 1.62     Lab Results   Component Value Date    HGBA1C 5.0 12/08/2023           Anthropometrics:  Height: 162.6 cm (5' 4\")   Weight: 81.6 kg (180 lb)   BMI (Calculated): 30.88  IBW/kg (Dietitian Calculated): 54.5 kg            Weight History:   Wt Readings from Last 10 Encounters:   05/02/24 81.6 kg (180 lb)   10/26/23 83.5 kg (184 lb)   10/21/23 83.5 kg (184 lb)   10/03/23 88.5 kg (195 lb 1.7 oz)   01/26/22 88.5 kg (195 lb)   10/29/21 87.3 kg (192 lb 6 oz)   02/02/21 90 kg (198 lb 8 oz)   01/04/21 89.1 kg (196 lb 8 oz)   09/14/20 85.7 kg (189 lb)   07/06/20 87.8 kg (193 lb 8 oz)        Weight Change %:  Weight History / % Weight Change: Noted a 7.7% loss x 7 months.  Significant Weight Loss: No          Nutrition Focused Physical Exam Findings:      Physical Findings:  Skin: Positive (Lower leg wound)    Estimated Needs:      Method for Estimating Needs: 8819-6974 kcals (25-30 kcal/kg IBW)  Total Protein Estimated Needs (g): 65 g  Method for Estimating Needs: 1.2 gm/kg IBW     Method for Estimating Needs: 1ml/kcal        Nutrition Diagnosis   Nutrition Diagnosis:  Malnutrition Diagnosis  Patient has Malnutrition Diagnosis: No    Nutrition Diagnosis  Patient has Nutrition Diagnosis: Yes  Diagnosis Status (1): Ongoing  Nutrition Diagnosis 1: " Predicted inadequate energy intake  Related to (1): fatigue, poor PO intake, and appetite d/t UTI  As Evidenced by (1): PO intake <75%       Nutrition Interventions/Recommendations   Nutrition Interventions and Recommendations:        Nutrition Prescription:  Individualized Nutrition Prescription Provided for : Regular diet, patient follows gluten free.  Continue Jerzy BID and Ensure plus high protein BID.        Nutrition Interventions:   Food and/or Nutrient Delivery Interventions  Interventions: Meals and snacks, Medical food supplement  Meals and Snacks: General healthful diet  Goal: >50% intake of meals  Medical Food Supplement: Commercial beverage  Goal: >75% intake of ONS         Nutrition Education:   Education Documentation  No documentation found.      Nutrition Counseling  Counseling Theoretical Approach: Other (Comment)  Goal: N/A - patient asleep       Nutrition Monitoring and Evaluation   Monitoring/Evaluation:   Food/Nutrient Related History Monitoring  Monitoring and Evaluation Plan: Energy intake  Energy Intake: Estimated energy intake  Criteria: meet >50% of estimated needs from diet and ONS    Body Composition/Growth/Weight History  Monitoring and Evaluation Plan: Weight  Weight: Weight change  Criteria: Maintain stable weight    Biochemical Data, Medical Tests and Procedures  Monitoring and Evaluation Plan: Electrolyte/renal panel  Electrolyte and Renal Panel: Sodium  Criteria: WNL    Nutrition Focused Physical Findings  Monitoring and Evaluation Plan: Skin  Skin: Impaired wound healing  Criteria: Promote healing            Time Spent/Follow-up Reminder:   Follow Up  Time Spent (min): 30 minutes  Last Date of Nutrition Visit: 05/08/24  Nutrition Follow-Up Needed?: Dietitian to reassess per policy  Follow up Comment: 5/13-5/15

## 2024-05-09 VITALS
RESPIRATION RATE: 18 BRPM | SYSTOLIC BLOOD PRESSURE: 159 MMHG | DIASTOLIC BLOOD PRESSURE: 83 MMHG | WEIGHT: 180 LBS | BODY MASS INDEX: 30.73 KG/M2 | HEIGHT: 64 IN | OXYGEN SATURATION: 97 % | TEMPERATURE: 97.8 F | HEART RATE: 79 BPM

## 2024-05-09 PROBLEM — I95.9 HYPOTENSION: Status: RESOLVED | Noted: 2024-05-02 | Resolved: 2024-05-09

## 2024-05-09 PROBLEM — N39.0 COMPLICATED UTI (URINARY TRACT INFECTION): Status: RESOLVED | Noted: 2024-05-02 | Resolved: 2024-05-09

## 2024-05-09 PROBLEM — K59.01 SLOW TRANSIT CONSTIPATION: Status: RESOLVED | Noted: 2024-05-02 | Resolved: 2024-05-09

## 2024-05-09 PROBLEM — F33.3 MAJOR DEPRESSIVE DISORDER, RECURRENT EPISODE, SEVERE, WITH PSYCHOSIS (MULTI): Status: RESOLVED | Noted: 2024-05-02 | Resolved: 2024-05-09

## 2024-05-09 PROBLEM — N39.0 URINARY TRACT INFECTION WITHOUT HEMATURIA, SITE UNSPECIFIED: Status: RESOLVED | Noted: 2024-05-03 | Resolved: 2024-05-09

## 2024-05-09 PROCEDURE — 2500000004 HC RX 250 GENERAL PHARMACY W/ HCPCS (ALT 636 FOR OP/ED): Performed by: NURSE PRACTITIONER

## 2024-05-09 PROCEDURE — 2500000001 HC RX 250 WO HCPCS SELF ADMINISTERED DRUGS (ALT 637 FOR MEDICARE OP): Performed by: INTERNAL MEDICINE

## 2024-05-09 PROCEDURE — 2500000001 HC RX 250 WO HCPCS SELF ADMINISTERED DRUGS (ALT 637 FOR MEDICARE OP): Performed by: PHYSICIAN ASSISTANT

## 2024-05-09 PROCEDURE — 97112 NEUROMUSCULAR REEDUCATION: CPT | Mod: GP,CQ | Performed by: PHYSICAL THERAPY ASSISTANT

## 2024-05-09 PROCEDURE — 2500000001 HC RX 250 WO HCPCS SELF ADMINISTERED DRUGS (ALT 637 FOR MEDICARE OP): Performed by: NURSE PRACTITIONER

## 2024-05-09 PROCEDURE — 2500000004 HC RX 250 GENERAL PHARMACY W/ HCPCS (ALT 636 FOR OP/ED)

## 2024-05-09 PROCEDURE — 2500000006 HC RX 250 W HCPCS SELF ADMINISTERED DRUGS (ALT 637 FOR ALL PAYERS): Performed by: INTERNAL MEDICINE

## 2024-05-09 PROCEDURE — 99238 HOSP IP/OBS DSCHRG MGMT 30/<: CPT | Performed by: INTERNAL MEDICINE

## 2024-05-09 PROCEDURE — 97116 GAIT TRAINING THERAPY: CPT | Mod: GP,CQ | Performed by: PHYSICAL THERAPY ASSISTANT

## 2024-05-09 PROCEDURE — 97535 SELF CARE MNGMENT TRAINING: CPT | Mod: GO,CO

## 2024-05-09 PROCEDURE — 97530 THERAPEUTIC ACTIVITIES: CPT | Mod: GP,CQ | Performed by: PHYSICAL THERAPY ASSISTANT

## 2024-05-09 PROCEDURE — 97530 THERAPEUTIC ACTIVITIES: CPT | Mod: GO,CO

## 2024-05-09 RX ORDER — NITROFURANTOIN 25; 75 MG/1; MG/1
100 CAPSULE ORAL 2 TIMES DAILY
Qty: 14 CAPSULE | Refills: 0 | Status: SHIPPED | OUTPATIENT
Start: 2024-05-09 | End: 2024-05-16

## 2024-05-09 RX ORDER — ALUMINUM HYDROXIDE, MAGNESIUM HYDROXIDE, AND SIMETHICONE 1200; 120; 1200 MG/30ML; MG/30ML; MG/30ML
30 SUSPENSION ORAL 4 TIMES DAILY PRN
Qty: 355 ML | Refills: 0 | Status: SHIPPED | OUTPATIENT
Start: 2024-05-09

## 2024-05-09 RX ADMIN — FUROSEMIDE 20 MG: 20 TABLET ORAL at 10:47

## 2024-05-09 RX ADMIN — POTASSIUM CHLORIDE 20 MEQ: 1500 TABLET, EXTENDED RELEASE ORAL at 10:47

## 2024-05-09 RX ADMIN — TIMOLOL MALEATE 1 DROP: 5 SOLUTION/ DROPS OPHTHALMIC at 10:47

## 2024-05-09 RX ADMIN — VANCOMYCIN HYDROCHLORIDE 1000 MG: 1 INJECTION, SOLUTION INTRAVENOUS at 10:52

## 2024-05-09 RX ADMIN — BRIMONIDINE TARTRATE 1 DROP: 2 SOLUTION/ DROPS OPHTHALMIC at 10:47

## 2024-05-09 RX ADMIN — ASPIRIN 81 MG CHEWABLE TABLET 81 MG: 81 TABLET CHEWABLE at 10:47

## 2024-05-09 RX ADMIN — TAMSULOSIN HYDROCHLORIDE 0.4 MG: 0.4 CAPSULE ORAL at 10:47

## 2024-05-09 RX ADMIN — SODIUM CHLORIDE, POTASSIUM CHLORIDE, SODIUM LACTATE AND CALCIUM CHLORIDE 100 ML/HR: 600; 310; 30; 20 INJECTION, SOLUTION INTRAVENOUS at 10:53

## 2024-05-09 RX ADMIN — POLYETHYLENE GLYCOL 3350 17 G: 17 POWDER, FOR SOLUTION ORAL at 10:47

## 2024-05-09 RX ADMIN — PROPRANOLOL HYDROCHLORIDE 20 MG: 10 TABLET ORAL at 10:47

## 2024-05-09 RX ADMIN — ARIPIPRAZOLE 2.5 MG: 5 TABLET ORAL at 10:47

## 2024-05-09 RX ADMIN — DIVALPROEX SODIUM 125 MG: 125 CAPSULE, COATED PELLETS ORAL at 10:47

## 2024-05-09 ASSESSMENT — ENCOUNTER SYMPTOMS
DIZZINESS: 0
SHORTNESS OF BREATH: 0
TROUBLE SWALLOWING: 0
BRUISES/BLEEDS EASILY: 0
FEVER: 0
APPETITE CHANGE: 0
WHEEZING: 0
WEAKNESS: 0
NUMBNESS: 0
SORE THROAT: 0
EYE PAIN: 0
FLANK PAIN: 0
FACIAL SWELLING: 0
BLOOD IN STOOL: 0
CHILLS: 0
DIARRHEA: 0
ABDOMINAL PAIN: 0
DIAPHORESIS: 0
HALLUCINATIONS: 0
BACK PAIN: 0
WOUND: 0
HEMATURIA: 0
LIGHT-HEADEDNESS: 0
COUGH: 0
FREQUENCY: 0
FATIGUE: 0
VOMITING: 0
DYSURIA: 0
NAUSEA: 0
CONSTIPATION: 0
JOINT SWELLING: 0
HEADACHES: 0
PALPITATIONS: 0
CHEST TIGHTNESS: 0

## 2024-05-09 ASSESSMENT — COGNITIVE AND FUNCTIONAL STATUS - GENERAL
HELP NEEDED FOR BATHING: A LOT
MOVING FROM LYING ON BACK TO SITTING ON SIDE OF FLAT BED WITH BEDRAILS: A LOT
PERSONAL GROOMING: A LOT
DRESSING REGULAR UPPER BODY CLOTHING: A LOT
TURNING FROM BACK TO SIDE WHILE IN FLAT BAD: TOTAL
MOVING TO AND FROM BED TO CHAIR: A LOT
WALKING IN HOSPITAL ROOM: TOTAL
EATING MEALS: A LOT
DAILY ACTIVITIY SCORE: 10
CLIMB 3 TO 5 STEPS WITH RAILING: TOTAL
DRESSING REGULAR LOWER BODY CLOTHING: TOTAL
MOBILITY SCORE: 8
STANDING UP FROM CHAIR USING ARMS: TOTAL
TOILETING: TOTAL

## 2024-05-09 ASSESSMENT — PAIN - FUNCTIONAL ASSESSMENT
PAIN_FUNCTIONAL_ASSESSMENT: 0-10
PAIN_FUNCTIONAL_ASSESSMENT: 0-10

## 2024-05-09 ASSESSMENT — ACTIVITIES OF DAILY LIVING (ADL): HOME_MANAGEMENT_TIME_ENTRY: 10

## 2024-05-09 ASSESSMENT — PAIN SCALES - GENERAL
PAINLEVEL_OUTOF10: 0 - NO PAIN
PAINLEVEL_OUTOF10: 0 - NO PAIN

## 2024-05-09 NOTE — PROGRESS NOTES
Occupational Therapy    OT Treatment    Patient Name: Qi Davila  MRN: [id number]  Today's Date: 5/9/2024  Time Calculation  Start Time: 0956  Stop Time: 1023  Time Calculation (min): 27 min         Assessment:  OT Assessment: Pt continues to require max A x2 for bed mobility and standing at EOB. Pt progressed activity tolerance to sitting EOB for 12 minutes. Pt requires max verbal cues to intiate/sequence activity, safety and transfer technique.  End of Session Communication: Bedside nurse  End of Session Patient Position: Bed, 3 rail up, Alarm on     Plan:  Treatment Interventions: ADL retraining, Functional transfer training, UE strengthening/ROM  OT Frequency: 3 times per week  OT Discharge Recommendations: Low intensity level of continued care, Moderate intensity level of continued care  Equipment Recommended upon Discharge: Other (comment) (TBD)  OT - OK to Discharge: Yes  Treatment Interventions: ADL retraining, Functional transfer training, UE strengthening/ROM    Subjective   Previous Visit Info:  OT Last Visit  OT Received On: 05/09/24  General:  General  Co-Treatment: PT  Co-Treatment Reason: requiring x2 skilled therapist to to maintain safety while progressing activity.  Prior to Session Communication: Bedside nurse  Patient Position Received: Bed, 3 rail up, Alarm on  General Comment: Pt agreeable and cooperative to therapy. Pt requires max verbal cues to initiate activity and remain on task.       Pain:  Pain Assessment  Pain Assessment: 0-10  Pain Score: 0 - No pain    Objective    Cognition:  Cognition  Orientation Level: Disoriented to place, Disoriented to time, Disoriented to situation       Activities of Daily Living:      Grooming  Grooming Level of Assistance: Maximum assistance           Functional Standing Tolerance:  Activity: Pt tolerated static standing for 15-30 seconds with max A x2 using FWW.  Bed Mobility/Transfers: Bed Mobility 1  Bed Mobility 1: Supine to sitting, Sitting to  supine  Level of Assistance 1: Maximum assistance, Moderate verbal cues (x2)  Bed Mobility 2  Bed Mobility  2: Scooting  Level of Assistance 2: Maximum assistance (x2)    Transfer 1  Technique 1: Sit to stand, Stand to sit  Transfer Device 1: Walker  Transfer Level of Assistance 1: Maximum assistance, +2, Moderate verbal cues, Moderate tactile cues                  Therapy/Activity:      Therapeutic Activity  Therapeutic Activity Performed: Yes  Therapeutic Activity 1: Pt tolerated sitting EOB for 12 minutes with CGA/min A.      Outcome Measures:[facility] Daily Activity  Putting on and taking off regular lower body clothing: Total  Bathing (including washing, rinsing, drying): A lot  Putting on and taking off regular upper body clothing: A lot  Toileting, which includes using toilet, bedpan or urinal: Total  Taking care of personal grooming such as brushing teeth: A lot  Eating Meals: A lot  Daily Activity - Total Score: 10        Education Documentation  Body Mechanics, taught by MIKE Kirk at 5/9/2024 11:12 AM.  Learner: Patient  Readiness: Acceptance  Method: Explanation  Response: Needs Reinforcement    ADL Training, taught by MIKE Kirk at 5/9/2024 11:12 AM.  Learner: Patient  Readiness: Acceptance  Method: Explanation  Response: Needs Reinforcement    Education Comments  No comments found.        Goals:  Encounter Problems       Encounter Problems (Active)       ADLs       Patient will perform UB and LB bathing with moderate assist level of assistance and PRN DME/AE. (Progressing)       Start:  05/04/24    Expected End:  05/07/24            Patient with complete upper body dressing with set-up level of assistance donning and doffing all UE clothes with PRN adaptive equipment (Progressing)       Start:  05/04/24    Expected End:  05/07/24            Patient with complete lower body dressing with moderate assist level of assistance donning and doffing all LE clothes  with PRN adaptive  equipment (Progressing)       Start:  05/04/24    Expected End:  05/07/24            Patient will feed self with modified independent level of assistance and  using PRN adaptive equipment. (Progressing)       Start:  05/04/24    Expected End:  05/07/24            Patient will complete daily grooming tasks brushing teeth and washing face/hair with set-up level of assistance and PRN adaptive equipment. (Progressing)       Start:  05/04/24    Expected End:  05/07/24            Patient will complete toileting including hygiene clothing management/hygiene with moderate assist level of assistance and PRN DME/AE. (Progressing)       Start:  05/04/24    Expected End:  05/07/24

## 2024-05-09 NOTE — DISCHARGE SUMMARY
Discharge Diagnosis  Complicated UTI (urinary tract infection)  Enterococcus bacteriuria and complicated CAUTI  Hypotension  Slow transit constipation  Major depressive disorder  Left lower extremity ulcers      Issues Requiring Follow-Up      Discharge Meds     Your medication list        START taking these medications        Instructions Last Dose Given Next Dose Due   alum-mag hydroxide-simeth 200-200-20 mg/5 mL oral suspension  Commonly known as: Mylanta      Take 30 mL by mouth 4 times a day as needed for indigestion or heartburn.       nitrofurantoin (macrocrystal-monohydrate) 100 mg capsule  Commonly known as: Macrobid      Take 1 capsule (100 mg) by mouth 2 times a day for 7 days.              CONTINUE taking these medications        Instructions Last Dose Given Next Dose Due   acetaminophen 500 mg tablet  Commonly known as: Tylenol           ARIPiprazole 5 mg tablet  Commonly known as: Abilify           aspirin 81 mg chewable tablet           bethanechol 5 mg tablet  Commonly known as: Urecholine           brimonidine 0.2 % ophthalmic solution  Commonly known as: AlphaGAN           divalproex sprinkle 125 mg DR capsule  Commonly known as: Depakote Sprinkle           docusate sodium 100 mg capsule  Commonly known as: Colace           furosemide 20 mg tablet  Commonly known as: Lasix           lactase 3,000 unit tablet  Commonly known as: Lactaid           levothyroxine 25 mcg tablet  Commonly known as: Synthroid, Levoxyl           loperamide 2 mg capsule  Commonly known as: Imodium           melatonin 3 mg tablet           Miralax 17 gram packet  Generic drug: polyethylene glycol           ondansetron 4 mg tablet  Commonly known as: Zofran           potassium chloride CR 20 mEq ER tablet  Commonly known as: Klor-Con M20           propranolol 20 mg tablet  Commonly known as: Inderal           tamsulosin 0.4 mg 24 hr capsule  Commonly known as: Flomax           timolol 0.5 % ophthalmic solution  Commonly known  as: Timoptic           traZODone 25 MG split tablet  Commonly known as: Gertrudisel           UNABLE TO FIND                  STOP taking these medications      QUEtiapine 25 mg tablet  Commonly known as: SEROquel        sertraline 50 mg tablet  Commonly known as: Zoloft                  Where to Get Your Medications        These medications were sent to [facility]  [address], [address], [place] [address]      Hours: 8 AM to 6 PM Mon-Fri, 9 AM to 1 PM Saturday Phone: [phone]   alum-mag hydroxide-simeth 200-200-20 mg/5 mL oral suspension  nitrofurantoin (macrocrystal-monohydrate) 100 mg capsule         Test Results Pending At Discharge  Pending Labs       No current pending labs.            Hospital Course   Qi Davila is a 83 y.o. 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  The patient was sent to the emergency department due to reports of low blood pressure and urinary tract infection patient had been more fatigued at her long-term care facility.  She does have some chronic memory loss but denies abdominal pain fevers chills nausea she states she has not been eating very well. Patient was also hypotensive reports of blood pressure in the 80s over 40s subsequently given IV fluids with improvement to 90s over 40s and now in the low 100s. CT scan of abdomen and pelvis was negative for any obstruction there were findings of increased stool in the rectal vault CT of the head was without acute findings chest x-ray also negative for any infiltrate effusion or pulmonary edema.         EKG shows sinus rhythm at a rate of 66 bpm no ST change elevation nonischemic EKG, normal intervals. Lactate negative. Manual disimpaction in ED @1705- report is remaining stool is soft.      5/3/2024: No acute events overnight. Vitals stable,  no further hypotension, /81 this morning. Labs largely unremarkable.  No leukocytosis. Does have some anemia, suspect element of hemodilution. She denies any acute complaints,s he is actually oriented x2-2.5  5/4: Patient was seen and examined.  She is awake and alert and oriented x 3.  Blood pressure remained stable.  PT/OT yet to see.  Blood cultures are negative.  Urine cultures are pending.  Continue to trend CBC and BMP daily.  5/5: Patient was seen and examined.  Continues to be awake alert and interactive.  Seen by PT OT who recommend extended-care facility on discharge.  Blood cultures are positive for Enterococcus faecium awaiting sensitivities; blood cultures remain negative.  Continue to trend CBC and BMP daily.  5/6: Patient was seen and examined.  Continues to be awake alert and interactive.  Enterococcus faecium in urine sensitive to vancomycin and Macrobid.  Started on IV vancomycin.  Likely switch to p.o. Macrobid on discharge.  Blood cultures remain negative.  Awaiting authorization for placement in an extended care facility.  5/7: Patient was seen and examined.  Denies any new complaints today.  Continue IV vancomycin with likely switch to Macrobid.  Patient was denies any shortness authorization for skilled nursing facility and now in an appeal process.  Deconditioning is likely related to current resolving urinary tract infection with Enterococcus.  She would therefore benefit from further rehabilitation in a skilled nursing facility for a short time prior to discharge to assisted living facility.  5/8: Patient was seen and examined.  Has no new complaints today.  Continue current IV antibiotics.  Awaiting appeal about authorization for placement in an extended care facility.  5/9: Patient was seen and examined.  Has no new complaints today.  IV antibiotics to be switched to p.o.  Currently awaiting authorization for placement in an extended care facility.      Pertinent Physical Exam At  Time of Discharge  Physical Exam  Constitutional:       General: She is not in acute distress.     Appearance: Normal appearance.      Comments: Elderly female, laying comfortably in bed  HENT:      Head: Normocephalic and atraumatic.      Right Ear: External ear normal.      Left Ear: External ear normal.      Nose: Nose normal.      Mouth/Throat:      Mouth: Mucous membranes are moist.      Pharynx: Oropharynx is clear.   Eyes:      Extraocular Movements: Extraocular movements intact.      Conjunctiva/sclera: Conjunctivae normal.      Pupils: Pupils are equal, round, and reactive to light.   Cardiovascular:      Rate and Rhythm: Normal rate and regular rhythm.      Pulses: Normal pulses.      Heart sounds: Normal heart sounds.   Pulmonary:      Effort: Pulmonary effort is normal. No respiratory distress.      Breath sounds: Normal breath sounds. No wheezing, rhonchi or rales.   Abdominal:      General: Bowel sounds are normal.      Palpations: Abdomen is soft.      Tenderness: There is no abdominal tenderness. There is no right CVA tenderness, left CVA tenderness, guarding or rebound.   Genitourinary:     Comments: Samson with clear yellow urine  Musculoskeletal:         General: No swelling. Normal range of motion.      Cervical back: Normal range of motion and neck supple.   Skin:     General: Skin is warm and dry.      Capillary Refill: Capillary refill takes less than 2 seconds.      Findings: Lesion present. No rash.      Comments: Left lower leg wound   Neurological:      General: No focal deficit present.      Mental Status: She is alert. Mental status is at baseline.      Comments: Slight tremor   Psychiatric:         Mood and Affect: Mood normal.         Behavior: Behavior normal.   Outpatient Follow-Up  No future appointments.      Solo Sage MD

## 2024-05-09 NOTE — PROGRESS NOTES
05/09/24 1557   Discharge Planning   Does the patient need discharge transport arranged? Yes   RoundTrip coordination needed? Yes   Has discharge transport been arranged? Yes   What day is the transport expected? 05/09/24   What time is the transport expected? 1700     Notified RN and voicemail left for patients daughter of transport time of 5PM to [facility] . Answered all questions and verbalized understanding.  Report number is  [phone]

## 2024-05-09 NOTE — NURSING NOTE
Patient being discharged to [facility].  IV d/c'd, insyte intact.   Report attempted to be called 3 separate times and no answer at the number that the  transfers me to.   Ambulance due to arrive at 1700.  All discharge paperwork with patient.

## 2024-05-09 NOTE — CARE PLAN
The patient's goals for the shift include      The clinical goals for the shift include Pt will remain free from injury and fall overnight      Problem: Safety - Adult  Goal: Free from fall injury  Outcome: Progressing     Problem: Discharge Planning  Goal: Discharge to home or other facility with appropriate resources  Outcome: Progressing     Problem: Chronic Conditions and Co-morbidities  Goal: Patient's chronic conditions and co-morbidity symptoms are monitored and maintained or improved  Outcome: Progressing     Problem: Skin  Goal: Decreased wound size/increased tissue granulation at next dressing change  Outcome: Progressing  Goal: Participates in plan/prevention/treatment measures  Outcome: Progressing  Goal: Prevent/manage excess moisture  Outcome: Progressing  Goal: Prevent/minimize sheer/friction injuries  Outcome: Progressing  Goal: Promote/optimize nutrition  Outcome: Progressing  Goal: Promote skin healing  Outcome: Progressing  Flowsheets (Taken 5/9/2024 0206)  Promote skin healing: Turn/reposition every 2 hours/use positioning/transfer devices     Problem: Nutrition  Goal: Oral intake greater 75%  Outcome: Progressing  Goal: Consume prescribed supplement  Outcome: Progressing  Goal: Promote healing  Outcome: Progressing     Problem: Fall/Injury  Goal: Not fall by end of shift  Outcome: Progressing  Goal: Be free from injury by end of the shift  Outcome: Progressing  Goal: Verbalize understanding of personal risk factors for fall in the hospital  Outcome: Progressing  Goal: Verbalize understanding of risk factor reduction measures to prevent injury from fall in the home  Outcome: Progressing  Goal: Use assistive devices by end of the shift  Outcome: Progressing  Goal: Pace activities to prevent fatigue by end of the shift  Outcome: Progressing

## 2024-05-09 NOTE — NURSING NOTE
Attempted again to call report to Kane Youssef, continue to get no answer after the  transfers my call.

## 2024-05-09 NOTE — PROGRESS NOTES
Qi Davila is a 83 y.o. female on day 6 of admission presenting with Complicated UTI (urinary tract infection).      Subjective   Qi Davila is a 83 y.o. 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  The patient was sent to the emergency department due to reports of low blood pressure and urinary tract infection patient had been more fatigued at her long-term care facility.  She does have some chronic memory loss but denies abdominal pain fevers chills nausea she states she has not been eating very well. Patient was also hypotensive reports of blood pressure in the 80s over 40s subsequently given IV fluids with improvement to 90s over 40s and now in the low 100s. CT scan of abdomen and pelvis was negative for any obstruction there were findings of increased stool in the rectal vault CT of the head was without acute findings chest x-ray also negative for any infiltrate effusion or pulmonary edema.  EKG shows sinus rhythm at a rate of 66 bpm no ST change elevation nonischemic EKG, normal intervals. Lactate negative. Manual disimpaction in ED @1705- report is remaining stool is soft.     5/3/2024: No acute events overnight. Vitals stable, no further hypotension, /81 this morning. Labs largely unremarkable.  No leukocytosis. Does have some anemia, suspect element of hemodilution. She denies any acute complaints,s he is actually oriented x2-2.5  5/4: Patient was seen and examined.  She is awake and alert and oriented x 3.  Blood pressure remained stable.  PT/OT yet to see.  Blood cultures are negative.  Urine cultures are pending.  Continue to trend CBC and BMP daily.  5/5: Patient was seen and examined.  Continues to be awake alert and interactive.  Seen by PT OT who recommend extended-care facility on discharge.  Blood cultures are  positive for Enterococcus faecium awaiting sensitivities; blood cultures remain negative.  Continue to trend CBC and BMP daily.  5/6: Patient was seen and examined.  Continues to be awake alert and interactive.  Enterococcus faecium in urine sensitive to vancomycin and Macrobid.  Started on IV vancomycin.  Likely switch to p.o. Macrobid on discharge.  Blood cultures remain negative.  Awaiting authorization for placement in an extended care facility.  5/7: Patient was seen and examined.  Denies any new complaints today.  Continue IV vancomycin with likely switch to Macrobid.  Patient was denies any shortness authorization for skilled nursing facility and now in an appeal process.  Deconditioning is likely related to current resolving urinary tract infection with Enterococcus.  She would therefore benefit from further rehabilitation in a skilled nursing facility for a short time prior to discharge to assisted living facility.  5/8: Patient was seen and examined.  Has no new complaints today.  Continue current IV antibiotics.  Awaiting appeal about authorization for placement in an extended care facility.  5/9: Patient was seen and examined.  Has no new complaints today.  IV antibiotics to be switched to p.o.  Currently awaiting authorization for placement in an extended care facility.  Review of Systems   Constitutional:  Negative for appetite change, chills, diaphoresis, fatigue and fever.   HENT:  Negative for congestion, ear pain, facial swelling, hearing loss, nosebleeds, sore throat, tinnitus and trouble swallowing.    Eyes:  Negative for pain.   Respiratory:  Negative for cough, chest tightness, shortness of breath and wheezing.    Cardiovascular:  Negative for chest pain, palpitations and leg swelling.   Gastrointestinal:  Negative for abdominal pain, blood in stool, constipation, diarrhea, nausea and vomiting.   Genitourinary:  Negative for dysuria, flank pain, frequency, hematuria and urgency.    Musculoskeletal:  Negative for back pain and joint swelling.   Skin:  Negative for rash and wound.   Neurological:  Negative for dizziness, syncope, weakness, light-headedness, numbness and headaches.   Hematological:  Does not bruise/bleed easily.   Psychiatric/Behavioral:  Negative for behavioral problems, hallucinations and suicidal ideas.           Objective     Last Recorded Vitals  /66 (BP Location: Left arm, Patient Position: Sitting)   Pulse 89   Temp 36.6 °C (97.8 °F) (Temporal)   Resp 18   Wt 81.6 kg (180 lb)   SpO2 98%     Image Results  CT abdomen pelvis w IV contrast    Result Date: 5/2/2024  STUDY: CT Abdomen and Pelvis with IV Contrast; 05/02/2024, 3:59 PM. INDICATION: Generalized abdominal pain. COMPARISON: CT AP: 09/28/23. ACCESSION NUMBER(S): [id number] ORDERING CLINICIAN: CELENA JONAS TECHNIQUE: CT of the abdomen and pelvis was performed.  Contiguous axial images were obtained at 3 mm slice thickness through the abdomen and pelvis. Coronal and sagittal reconstructions at 3 mm slice thickness were performed.  Omnipaque 350 75 mL was administered intravenously.  FINDINGS: LOWER CHEST: No cardiomegaly.  No pericardial effusion.  Linear bibasilar areas of atelectasis  ABDOMEN:  LIVER: No hepatomegaly.  Smooth surface contour.  Mild fatty infiltration of the liver.  Stable small hepatic cysts  BILE DUCTS: No intrahepatic or extrahepatic biliary ductal dilatation.  GALLBLADDER: The gallbladder is present without gallstones. STOMACH: Moderate size hiatal hernia.  PANCREAS: Pancreatic parenchymal calcifications  SPLEEN: No splenomegaly or focal splenic lesion.  ADRENAL GLANDS: No thickening or nodules.  KIDNEYS AND URETERS: Kidneys are normal in size and location.  No renal or ureteral calculi.  PELVIS:  BLADDER: No abnormalities identified.  REPRODUCTIVE ORGANS: No abnormalities identified.  BOWEL: Appendix is normal.  Large amount of formed stool in the rectal vault measuring up to  8.3 cm in diameter with mild perirectal fat stranding.  Moderate stool throughout the remainder of the colon.  VESSELS: No abnormalities identified.  Abdominal aorta is normal in caliber. Moderate to severe plaque along the distal aorta.  PERITONEUM/RETROPERITONEUM/LYMPH NODES: No free fluid.  No pneumoperitoneum. No lymphadenopathy.  ABDOMINAL WALL: No abnormalities identified. SOFT TISSUES: No abnormalities identified.  BONES: No acute fracture or aggressive osseous lesion.  Bilateral total hip arthroplasty.  Scoliosis and degenerative change of the lumbar spine.    1. Large amount of formed stool in the rectal vault measuring up to 8.3 cm in diameter with mild perirectal fat stranding. Moderate stool throughout the remainder of the colon. Findings suggestive of fecal impaction with mild associated stercoral proctitis. 2. Mild hepatic steatosis. 3. Moderate size hiatal hernia. 4. Findings of chronic pancreatitis. Signed by Frankie Galindo MD    CT head wo IV contrast    Result Date: 5/2/2024  Interpreted By:  Joesph Ramírez, STUDY: CT HEAD WO IV CONTRAST;  5/2/2024 3:55 pm   INDICATION: Altered mental status.   COMPARISON: None.   ACCESSION NUMBER(S): [id number]   ORDERING CLINICIAN: CELENA JONAS   TECHNIQUE: Noncontrast axial CT scan of head was performed.   FINDINGS: Parenchyma: There is no intracranial hemorrhage. The grey-white differentiation is intact. There is no mass effect or midline shift. Patchy supratentorial hypodensity, nonspecific, but likely secondary to mild chronic microvascular ischemia.   CSF Spaces: Mild generalized volume loss with concordant ventriculomegaly.   Extra-Axial Fluid: There is no extraaxial fluid collection.   Calvarium: The calvarium is unremarkable.   Paranasal sinuses: Visualized paranasal sinuses are clear.   Mastoids: Clear.   Orbits: Normal.   Soft tissues: Unremarkable.       No acute intracranial hemorrhage, mass effect, or CT apparent acute infarct. Chronic  microvascular ischemic and involutional changes.   Signed by: Joesph Ramírez 5/2/2024 4:13 PM Dictation workstation:   [id number]    XR chest 2 views    Result Date: 5/2/2024  STUDY: Chest Radiographs;  5/2/2024 at 14:26 INDICATION: Lethargy, cough. COMPARISON: XR Chest 10/2/23. ACCESSION NUMBER(S): [id number] ORDERING CLINICIAN: CELENA JONAS TECHNIQUE:  Frontal and lateral chest. FINDINGS: CARDIOMEDIASTINAL SILHOUETTE: Cardiomediastinal silhouette is normal in size and configuration.  LUNGS: There is elevation the left hemidiaphragm.  ABDOMEN: No remarkable upper abdominal findings.  BONES: There are degenerative change with joint space loss osteophytes and loose bodies involving both shoulders.    No evidence consolidating infiltrate or effusion. Signed by Christopher Villalta MD    ECG 12 lead    Result Date: 5/2/2024  Sinus rhythm Low voltage, precordial leads       Lab Results  No results found for this or any previous visit (from the past 24 hour(s)).       Medications  Scheduled medications:  ARIPiprazole, 2.5 mg, oral, Daily  aspirin, 81 mg, oral, Daily  brimonidine, 1 drop, Both Eyes, BID  cefTRIAXone, 1 g, intravenous, q24h  divalproex sprinkle, 125 mg, oral, BID  enoxaparin, 40 mg, subcutaneous, q24h  furosemide, 20 mg, oral, q AM  [Held by provider] lactase, 3,000 Units, oral, TID with meals  polyethylene glycol, 17 g, oral, Daily  potassium chloride CR, 20 mEq, oral, Daily  propranolol, 20 mg, oral, BID  tamsulosin, 0.4 mg, oral, Daily  timolol, 1 drop, Both Eyes, Daily  traZODone, 25 mg, oral, Nightly  vancomycin, 1,000 mg, intravenous, q12h      Continuous medications:  lactated Ringer's, 100 mL/hr, Last Rate: 100 mL/hr (05/09/24 1053)      PRN medications:  PRN medications: alum-mag hydroxide-simeth, docusate sodium, loperamide, ondansetron, polyethylene glycol, vancomycin     Physical Exam  Constitutional:       General: She is not in acute distress.     Appearance: Normal appearance.       Comments: Elderly female, siting upright in bed   HENT:      Head: Normocephalic and atraumatic.      Right Ear: External ear normal.      Left Ear: External ear normal.      Nose: Nose normal.      Mouth/Throat:      Mouth: Mucous membranes are moist.      Pharynx: Oropharynx is clear.   Eyes:      Extraocular Movements: Extraocular movements intact.      Conjunctiva/sclera: Conjunctivae normal.      Pupils: Pupils are equal, round, and reactive to light.   Cardiovascular:      Rate and Rhythm: Normal rate and regular rhythm.      Pulses: Normal pulses.      Heart sounds: Normal heart sounds.   Pulmonary:      Effort: Pulmonary effort is normal. No respiratory distress.      Breath sounds: Normal breath sounds. No wheezing, rhonchi or rales.   Abdominal:      General: Bowel sounds are normal.      Palpations: Abdomen is soft.      Tenderness: There is no abdominal tenderness. There is no right CVA tenderness, left CVA tenderness, guarding or rebound.   Genitourinary:     Comments: Samson with clear yellow urine  Musculoskeletal:         General: No swelling. Normal range of motion.      Cervical back: Normal range of motion and neck supple.   Skin:     General: Skin is warm and dry.      Capillary Refill: Capillary refill takes less than 2 seconds.      Findings: Lesion present. No rash.      Comments: Left lower leg wound   Neurological:      General: No focal deficit present.      Mental Status: She is alert. Mental status is at baseline.      Comments: Slight tremor   Psychiatric:         Mood and Affect: Mood normal.         Behavior: Behavior normal.                  Code Status  Full Code     Assessment/Plan      Enterococcus faecium bacteriuria and complicated CAUTI, POA  Continue patient on IV Rocephin   Blood cultures are negative x 1 day  Urine cultures are still pending  Patient unable to elaborate exactly why she has catheter in place, appears to be related to chronic incontinence and wounds  Will  request wound care consultation     Hypotension unclear if related to sepsis or poor intake  Patient had good response to IV fluids in emergency department  Continue supplemental IV fluids for now  Monitor blood pressure closely  Avoid hypotensive medications- reintroduce home meds as appropriate      Slow transit constipation  Patient appeared to have fecal impaction on diagnostic imaging  Patient was partially disimpacted from the ER report is remaining stool is soft  We will continue patient on MiraLAX     Major depressive disorder with history of psychosis  Continue home medications    Left lower leg wound  Wound care consult appreciated  Wound care recommends follow up in [place] wound clinic for this    DVT ppx: Lovenox     Spent 35 minutes in the follow-up management of this patient today.  Please see orders for more complete plan    Solo Sage MD

## 2024-05-09 NOTE — PROGRESS NOTES
Physical Therapy    Physical Therapy Treatment    Patient Name: Qi Davila  MRN: [id number]  Today's Date: 5/9/2024  Time Calculation  Start Time: 0955  Stop Time: 1023  Time Calculation (min): 28 min    Assessment/Plan   PT Assessment  End of Session Communication: Bedside nurse  Assessment Comment: pt  with increased processing time with all cues. She is cooperative and would benefit from further skilled treatment.  End of Session Patient Position: Bed, 3 rail up, Alarm on     PT Plan  Treatment/Interventions: Bed mobility, Transfer training, Balance training, Strengthening, Endurance training, Range of motion, Therapeutic exercise, Therapeutic activity, Positioning, Wheelchair management  PT Plan: Skilled PT  PT Frequency: 3 times per week  PT Discharge Recommendations: Moderate intensity level of continued care  PT Recommended Transfer Status: Total assist, Assist x2  PT - OK to Discharge: Yes      General Visit Information:   PT  Visit  PT Received On: 05/09/24  Response to Previous Treatment: Patient with no complaints from previous session.  General  Reason for Referral: impaired mobility  Referred By: jefferson  Prior to Session Communication: Bedside nurse  Patient Position Received: Bed, 3 rail up, Alarm on  Preferred Learning Style: verbal  General Comment: pt agreeable to PT and cleared by RN.      Precautions:  Precautions  Medical Precautions: Fall precautions    Pain:  Pain Assessment  Pain Assessment: 0-10  Pain Score: 0 - No pain  Cognition:  Cognition  Orientation Level: Disoriented to place, Disoriented to time, Disoriented to situation  Postural Control:  Static Sitting Balance  Static Sitting-Balance Support: Feet supported  Static Sitting-Level of Assistance: Minimum assistance  Static Sitting-Comment/Number of Minutes: pt sat x 10 minutes with cues for safety  Static Standing Balance  Static Standing-Balance Support: Bilateral upper extremity supported  Static Standing-Level of Assistance:  Maximum assistance (X2 with lean to R)  Static Standing-Comment/Number of Minutes: pt stood x 2 attempts 30 seconds each.       Treatments:  Bed Mobility  Bed Mobility: Yes  Bed Mobility 1  Bed Mobility 1: Rolling right, Rolling left  Level of Assistance 1: Maximum assistance, Moderate verbal cues, Minimal tactile cues (Ax2)  Bed Mobility Comments 1: pt educated on improved hand placement and safety.  Bed Mobility 2  Bed Mobility  2: Supine to sitting, Sitting to supine  Level of Assistance 2: Maximum verbal cues (AX2 with cues for technique and safety.)  Bed Mobility 3  Bed Mobility 3: Scooting  Level of Assistance 3: Maximum assistance, Maximum verbal cues (AX2 with cues for hand placement and safety.)       Transfers  Transfer: Yes  Transfer 1  Technique 1: Sit to stand, Stand to sit  Transfer Device 1: Walker  Transfer Level of Assistance 1: Maximum assistance, +2, Moderate verbal cues, Moderate tactile cues  Trials/Comments 1: cues for proper hand placement and safety.    Outcome Measures:  [facility] Basic Mobility  Turning from your back to your side while in a flat bed without using bedrails: A lot  Moving from lying on your back to sitting on the side of a flat bed without using bedrails: Total  Moving to and from bed to chair (including a wheelchair): A lot  Standing up from a chair using your arms (e.g. wheelchair or bedside chair): Total  To walk in hospital room: Total  Climbing 3-5 steps with railing: Total  Basic Mobility - Total Score: 8    Education Documentation  Body Mechanics, taught by Alecia Mota PTA at 5/9/2024 11:08 AM.  Learner: Patient  Readiness: Acceptance  Method: Explanation  Response: Verbalizes Understanding, Needs Reinforcement    Home Exercise Program, taught by Alecia Mota PTA at 5/9/2024 11:08 AM.  Learner: Patient  Readiness: Acceptance  Method: Explanation  Response: Verbalizes Understanding, Needs Reinforcement    Precautions, taught by Alecia Mota PTA at 5/9/2024 11:08  AM.  Learner: Patient  Readiness: Acceptance  Method: Explanation  Response: Verbalizes Understanding, Needs Reinforcement    Mobility Training, taught by Alecia Mota PTA at 5/9/2024 11:08 AM.  Learner: Patient  Readiness: Acceptance  Method: Explanation  Response: Verbalizes Understanding, Needs Reinforcement    Education Comments  No comments found.               Encounter Problems       Encounter Problems (Active)       PT Transfers       STG - Transfer from bed to chair with max A x1 using LRD (Progressing)       Start:  05/04/24    Expected End:  05/07/24            STG - Patient will perform bed mobility with min a x1 (Progressing)       Start:  05/04/24    Expected End:  05/07/24            STG - Patient will transfer sit to and from stand using LRD with mod A x1 (Progressing)       Start:  05/04/24    Expected End:  05/07/24

## 2024-05-18 NOTE — DOCUMENTATION CLARIFICATION NOTE
"    PATIENT:               SHAHANA CARDONA  ACCT #:                  [id number]  MRN:                       [id number]  :                       1940  ADMIT DATE:       2024 1:19 PM  DISCH DATE:        2024 6:49 PM  RESPONDING PROVIDER #:        [id number]          PROVIDER RESPONSE TEXT:    Patient treated for UTI/Cauti without sepsis    CDI QUERY TEXT:    Clarification        Instruction:  Based on your assessment of the patient and the clinical information, please provide the requested documentation by clicking on the appropriate radio button and enter any additional information if prompted.    Question: Is there a diagnosis indicative of a patient meeting SIRS criteria and with organ dysfunction in the setting of uti infection      When answering this query, please exercise your independent professional judgment. The fact that a question is being asked, does not imply that any particular answer is desired or expected.    The patient's clinical indicators include:  Clinical Information: 83 yr. old admitted with CAUTI. Chronic indwelling catheter. Hypotension unclear if r/t sepsis or poor po intake. Constipation. Left lower extremity ulcers. Hyponatremia.    Clinical Indicators:  24 Vital signs on admit: T 97.7, hr 62, rr 18, pox 96 percent.  24 labs on admit: WBC 9.1. BUN/Cr 23/0.65. Total bili 0.3, Lactate 1.8. Neutrophils 3.65. Plt 188  Blood cultures with no growth.  Urine with enterococcus faecium.  GCS of 15  24 ED: \"Patient lactic acid was 1.8 did not require 30 cc/kg fluid bolus.  No endorgan ischemia.  Due to the low blood pressures, will admit. \"    Treatment: Avoid hypotensive meds. IVF, IV Rocephin, IV Vanc, Macrobid    Risk Factors: Cauti, poor po/hyponatremia  Options provided:  -- Patient treated for UTI/Cauti without sepsis  -- Sepsis with organ dysfunction of hypotension  -- Other - I will add my own diagnosis  -- Refer to Clinical Documentation Reviewer    Query created by: " Paola Bowman on 5/14/2024 11:12 AM      Electronically signed by:  PREM SKINNER MD 5/18/2024 9:18 AM